# Patient Record
Sex: FEMALE | Race: WHITE | NOT HISPANIC OR LATINO | Employment: OTHER | URBAN - METROPOLITAN AREA
[De-identification: names, ages, dates, MRNs, and addresses within clinical notes are randomized per-mention and may not be internally consistent; named-entity substitution may affect disease eponyms.]

---

## 2017-05-05 ENCOUNTER — APPOINTMENT (OUTPATIENT)
Dept: PHYSICAL THERAPY | Facility: MEDICAL CENTER | Age: 82
End: 2017-05-05
Payer: COMMERCIAL

## 2017-05-05 PROCEDURE — 97161 PT EVAL LOW COMPLEX 20 MIN: CPT

## 2017-05-08 ENCOUNTER — APPOINTMENT (OUTPATIENT)
Dept: PHYSICAL THERAPY | Facility: MEDICAL CENTER | Age: 82
End: 2017-05-08
Payer: COMMERCIAL

## 2017-05-08 PROCEDURE — 97110 THERAPEUTIC EXERCISES: CPT

## 2017-05-08 PROCEDURE — 97140 MANUAL THERAPY 1/> REGIONS: CPT

## 2017-05-12 ENCOUNTER — APPOINTMENT (OUTPATIENT)
Dept: PHYSICAL THERAPY | Facility: MEDICAL CENTER | Age: 82
End: 2017-05-12
Payer: COMMERCIAL

## 2017-05-12 ENCOUNTER — TRANSCRIBE ORDERS (OUTPATIENT)
Dept: ADMINISTRATIVE | Facility: HOSPITAL | Age: 82
End: 2017-05-12

## 2017-05-12 DIAGNOSIS — M54.16 LEFT LUMBAR RADICULOPATHY: Primary | ICD-10-CM

## 2017-05-12 PROCEDURE — 97110 THERAPEUTIC EXERCISES: CPT

## 2017-05-13 ENCOUNTER — HOSPITAL ENCOUNTER (OUTPATIENT)
Dept: MRI IMAGING | Facility: HOSPITAL | Age: 82
Discharge: HOME | End: 2017-05-13
Payer: COMMERCIAL

## 2017-05-13 DIAGNOSIS — M54.16 LEFT LUMBAR RADICULOPATHY: ICD-10-CM

## 2017-05-13 PROCEDURE — 72148 MRI LUMBAR SPINE W/O DYE: CPT

## 2017-05-15 ENCOUNTER — ALLSCRIPTS OFFICE VISIT (OUTPATIENT)
Dept: OTHER | Facility: OTHER | Age: 82
End: 2017-05-15

## 2017-05-15 ENCOUNTER — APPOINTMENT (OUTPATIENT)
Dept: PHYSICAL THERAPY | Facility: MEDICAL CENTER | Age: 82
End: 2017-05-15
Payer: COMMERCIAL

## 2017-05-16 ENCOUNTER — GENERIC CONVERSION - ENCOUNTER (OUTPATIENT)
Dept: OTHER | Facility: OTHER | Age: 82
End: 2017-05-16

## 2017-05-18 ENCOUNTER — GENERIC CONVERSION - ENCOUNTER (OUTPATIENT)
Dept: OTHER | Facility: OTHER | Age: 82
End: 2017-05-18

## 2017-05-18 ENCOUNTER — ALLSCRIPTS OFFICE VISIT (OUTPATIENT)
Dept: RADIOLOGY | Facility: CLINIC | Age: 82
End: 2017-05-18
Payer: COMMERCIAL

## 2017-05-19 ENCOUNTER — GENERIC CONVERSION - ENCOUNTER (OUTPATIENT)
Dept: OTHER | Facility: OTHER | Age: 82
End: 2017-05-19

## 2017-05-19 ENCOUNTER — APPOINTMENT (OUTPATIENT)
Dept: PHYSICAL THERAPY | Facility: MEDICAL CENTER | Age: 82
End: 2017-05-19
Payer: COMMERCIAL

## 2017-05-22 ENCOUNTER — APPOINTMENT (OUTPATIENT)
Dept: PHYSICAL THERAPY | Facility: MEDICAL CENTER | Age: 82
End: 2017-05-22
Payer: COMMERCIAL

## 2017-05-24 ENCOUNTER — GENERIC CONVERSION - ENCOUNTER (OUTPATIENT)
Dept: OTHER | Facility: OTHER | Age: 82
End: 2017-05-24

## 2017-05-25 ENCOUNTER — GENERIC CONVERSION - ENCOUNTER (OUTPATIENT)
Dept: OTHER | Facility: OTHER | Age: 82
End: 2017-05-25

## 2017-05-26 ENCOUNTER — APPOINTMENT (OUTPATIENT)
Dept: PHYSICAL THERAPY | Facility: MEDICAL CENTER | Age: 82
End: 2017-05-26
Payer: COMMERCIAL

## 2017-05-30 ENCOUNTER — APPOINTMENT (OUTPATIENT)
Dept: PHYSICAL THERAPY | Facility: MEDICAL CENTER | Age: 82
End: 2017-05-30
Payer: COMMERCIAL

## 2017-05-30 ENCOUNTER — GENERIC CONVERSION - ENCOUNTER (OUTPATIENT)
Dept: OTHER | Facility: OTHER | Age: 82
End: 2017-05-30

## 2017-06-01 ENCOUNTER — GENERIC CONVERSION - ENCOUNTER (OUTPATIENT)
Dept: OTHER | Facility: OTHER | Age: 82
End: 2017-06-01

## 2017-06-01 ENCOUNTER — ALLSCRIPTS OFFICE VISIT (OUTPATIENT)
Dept: RADIOLOGY | Facility: CLINIC | Age: 82
End: 2017-06-01
Payer: COMMERCIAL

## 2017-06-08 ENCOUNTER — GENERIC CONVERSION - ENCOUNTER (OUTPATIENT)
Dept: OTHER | Facility: OTHER | Age: 82
End: 2017-06-08

## 2017-06-15 ENCOUNTER — ALLSCRIPTS OFFICE VISIT (OUTPATIENT)
Dept: RADIOLOGY | Facility: CLINIC | Age: 82
End: 2017-06-15
Payer: COMMERCIAL

## 2017-06-16 ENCOUNTER — ALLSCRIPTS OFFICE VISIT (OUTPATIENT)
Dept: OTHER | Facility: OTHER | Age: 82
End: 2017-06-16

## 2017-06-16 ENCOUNTER — GENERIC CONVERSION - ENCOUNTER (OUTPATIENT)
Dept: OTHER | Facility: OTHER | Age: 82
End: 2017-06-16

## 2017-06-20 ENCOUNTER — GENERIC CONVERSION - ENCOUNTER (OUTPATIENT)
Dept: OTHER | Facility: OTHER | Age: 82
End: 2017-06-20

## 2017-07-07 ENCOUNTER — ALLSCRIPTS OFFICE VISIT (OUTPATIENT)
Dept: OTHER | Facility: OTHER | Age: 82
End: 2017-07-07

## 2017-07-07 DIAGNOSIS — M48.061 SPINAL STENOSIS OF LUMBAR REGION: ICD-10-CM

## 2017-07-07 DIAGNOSIS — M43.16 SPONDYLOLISTHESIS OF LUMBAR REGION: ICD-10-CM

## 2017-07-07 DIAGNOSIS — M54.16 RADICULOPATHY OF LUMBAR REGION: ICD-10-CM

## 2017-07-07 DIAGNOSIS — M54.50 LOW BACK PAIN: ICD-10-CM

## 2017-07-07 DIAGNOSIS — M51.26 OTHER INTERVERTEBRAL DISC DISPLACEMENT, LUMBAR REGION: ICD-10-CM

## 2017-07-07 DIAGNOSIS — M79.606 PAIN OF LOWER EXTREMITY: ICD-10-CM

## 2017-07-11 ENCOUNTER — APPOINTMENT (OUTPATIENT)
Dept: PHYSICAL THERAPY | Facility: MEDICAL CENTER | Age: 82
End: 2017-07-11
Payer: COMMERCIAL

## 2017-07-11 DIAGNOSIS — M43.16 SPONDYLOLISTHESIS OF LUMBAR REGION: ICD-10-CM

## 2017-07-11 DIAGNOSIS — M54.16 RADICULOPATHY OF LUMBAR REGION: ICD-10-CM

## 2017-07-11 DIAGNOSIS — M51.26 OTHER INTERVERTEBRAL DISC DISPLACEMENT, LUMBAR REGION: ICD-10-CM

## 2017-07-11 DIAGNOSIS — M48.061 SPINAL STENOSIS OF LUMBAR REGION: ICD-10-CM

## 2017-07-11 DIAGNOSIS — M79.606 PAIN OF LOWER EXTREMITY: ICD-10-CM

## 2017-07-11 DIAGNOSIS — M54.50 LOW BACK PAIN: ICD-10-CM

## 2017-07-11 PROCEDURE — 97161 PT EVAL LOW COMPLEX 20 MIN: CPT

## 2017-07-14 ENCOUNTER — APPOINTMENT (OUTPATIENT)
Dept: PHYSICAL THERAPY | Facility: MEDICAL CENTER | Age: 82
End: 2017-07-14
Payer: COMMERCIAL

## 2017-07-14 PROCEDURE — 97110 THERAPEUTIC EXERCISES: CPT

## 2017-07-19 ENCOUNTER — APPOINTMENT (OUTPATIENT)
Dept: PHYSICAL THERAPY | Facility: MEDICAL CENTER | Age: 82
End: 2017-07-19
Payer: COMMERCIAL

## 2017-07-19 PROCEDURE — 97110 THERAPEUTIC EXERCISES: CPT

## 2017-07-21 ENCOUNTER — APPOINTMENT (OUTPATIENT)
Dept: PHYSICAL THERAPY | Facility: MEDICAL CENTER | Age: 82
End: 2017-07-21
Payer: COMMERCIAL

## 2017-07-21 PROCEDURE — 97110 THERAPEUTIC EXERCISES: CPT

## 2017-07-26 ENCOUNTER — APPOINTMENT (OUTPATIENT)
Dept: PHYSICAL THERAPY | Facility: MEDICAL CENTER | Age: 82
End: 2017-07-26
Payer: COMMERCIAL

## 2017-07-26 PROCEDURE — 97140 MANUAL THERAPY 1/> REGIONS: CPT

## 2017-07-26 PROCEDURE — 97110 THERAPEUTIC EXERCISES: CPT

## 2017-07-28 ENCOUNTER — APPOINTMENT (OUTPATIENT)
Dept: PHYSICAL THERAPY | Facility: MEDICAL CENTER | Age: 82
End: 2017-07-28
Payer: COMMERCIAL

## 2017-07-28 PROCEDURE — 97012 MECHANICAL TRACTION THERAPY: CPT

## 2017-07-28 PROCEDURE — 97110 THERAPEUTIC EXERCISES: CPT

## 2017-08-01 ENCOUNTER — APPOINTMENT (OUTPATIENT)
Dept: PHYSICAL THERAPY | Facility: MEDICAL CENTER | Age: 82
End: 2017-08-01
Payer: COMMERCIAL

## 2017-08-01 PROCEDURE — 97110 THERAPEUTIC EXERCISES: CPT

## 2017-08-02 ENCOUNTER — APPOINTMENT (OUTPATIENT)
Dept: PHYSICAL THERAPY | Facility: MEDICAL CENTER | Age: 82
End: 2017-08-02
Payer: COMMERCIAL

## 2017-08-04 ENCOUNTER — APPOINTMENT (OUTPATIENT)
Dept: PHYSICAL THERAPY | Facility: MEDICAL CENTER | Age: 82
End: 2017-08-04
Payer: COMMERCIAL

## 2017-08-04 PROCEDURE — 97012 MECHANICAL TRACTION THERAPY: CPT

## 2017-08-04 PROCEDURE — 97110 THERAPEUTIC EXERCISES: CPT

## 2017-08-08 ENCOUNTER — APPOINTMENT (OUTPATIENT)
Dept: PHYSICAL THERAPY | Facility: MEDICAL CENTER | Age: 82
End: 2017-08-08
Payer: COMMERCIAL

## 2017-08-08 PROCEDURE — 97110 THERAPEUTIC EXERCISES: CPT

## 2017-08-09 ENCOUNTER — APPOINTMENT (OUTPATIENT)
Dept: PHYSICAL THERAPY | Facility: MEDICAL CENTER | Age: 82
End: 2017-08-09
Payer: COMMERCIAL

## 2017-08-11 ENCOUNTER — APPOINTMENT (OUTPATIENT)
Dept: PHYSICAL THERAPY | Facility: MEDICAL CENTER | Age: 82
End: 2017-08-11
Payer: COMMERCIAL

## 2017-08-11 PROCEDURE — 97110 THERAPEUTIC EXERCISES: CPT

## 2017-08-15 ENCOUNTER — APPOINTMENT (OUTPATIENT)
Dept: PHYSICAL THERAPY | Facility: MEDICAL CENTER | Age: 82
End: 2017-08-15
Payer: COMMERCIAL

## 2017-08-15 PROCEDURE — 97110 THERAPEUTIC EXERCISES: CPT

## 2017-08-15 PROCEDURE — 97012 MECHANICAL TRACTION THERAPY: CPT

## 2017-08-18 ENCOUNTER — APPOINTMENT (OUTPATIENT)
Dept: PHYSICAL THERAPY | Facility: MEDICAL CENTER | Age: 82
End: 2017-08-18
Payer: COMMERCIAL

## 2017-08-18 PROCEDURE — 97012 MECHANICAL TRACTION THERAPY: CPT

## 2017-08-18 PROCEDURE — 97110 THERAPEUTIC EXERCISES: CPT

## 2017-08-22 ENCOUNTER — APPOINTMENT (OUTPATIENT)
Dept: PHYSICAL THERAPY | Facility: MEDICAL CENTER | Age: 82
End: 2017-08-22
Payer: COMMERCIAL

## 2017-08-22 PROCEDURE — 97110 THERAPEUTIC EXERCISES: CPT

## 2017-08-22 PROCEDURE — 97140 MANUAL THERAPY 1/> REGIONS: CPT

## 2017-08-25 ENCOUNTER — APPOINTMENT (OUTPATIENT)
Dept: PHYSICAL THERAPY | Facility: MEDICAL CENTER | Age: 82
End: 2017-08-25
Payer: COMMERCIAL

## 2017-08-25 PROCEDURE — 97012 MECHANICAL TRACTION THERAPY: CPT

## 2017-08-25 PROCEDURE — 97110 THERAPEUTIC EXERCISES: CPT

## 2017-08-29 ENCOUNTER — APPOINTMENT (OUTPATIENT)
Dept: PHYSICAL THERAPY | Facility: MEDICAL CENTER | Age: 82
End: 2017-08-29
Payer: COMMERCIAL

## 2017-08-29 PROCEDURE — 97012 MECHANICAL TRACTION THERAPY: CPT

## 2017-08-29 PROCEDURE — 97110 THERAPEUTIC EXERCISES: CPT

## 2017-09-01 ENCOUNTER — APPOINTMENT (OUTPATIENT)
Dept: PHYSICAL THERAPY | Facility: MEDICAL CENTER | Age: 82
End: 2017-09-01
Payer: COMMERCIAL

## 2017-09-01 PROCEDURE — 97110 THERAPEUTIC EXERCISES: CPT

## 2017-09-01 PROCEDURE — 97012 MECHANICAL TRACTION THERAPY: CPT

## 2017-09-05 ENCOUNTER — APPOINTMENT (OUTPATIENT)
Dept: PHYSICAL THERAPY | Facility: MEDICAL CENTER | Age: 82
End: 2017-09-05
Payer: COMMERCIAL

## 2017-09-05 PROCEDURE — 97110 THERAPEUTIC EXERCISES: CPT

## 2017-09-05 PROCEDURE — 97012 MECHANICAL TRACTION THERAPY: CPT

## 2017-09-08 ENCOUNTER — APPOINTMENT (OUTPATIENT)
Dept: PHYSICAL THERAPY | Facility: MEDICAL CENTER | Age: 82
End: 2017-09-08
Payer: COMMERCIAL

## 2017-09-08 PROCEDURE — 97110 THERAPEUTIC EXERCISES: CPT

## 2017-09-12 ENCOUNTER — APPOINTMENT (OUTPATIENT)
Dept: PHYSICAL THERAPY | Facility: MEDICAL CENTER | Age: 82
End: 2017-09-12
Payer: COMMERCIAL

## 2017-09-12 PROCEDURE — 97110 THERAPEUTIC EXERCISES: CPT

## 2017-09-14 ENCOUNTER — GENERIC CONVERSION - ENCOUNTER (OUTPATIENT)
Dept: OTHER | Facility: OTHER | Age: 82
End: 2017-09-14

## 2017-09-15 ENCOUNTER — ALLSCRIPTS OFFICE VISIT (OUTPATIENT)
Dept: OTHER | Facility: OTHER | Age: 82
End: 2017-09-15

## 2017-09-15 ENCOUNTER — APPOINTMENT (OUTPATIENT)
Dept: PHYSICAL THERAPY | Facility: MEDICAL CENTER | Age: 82
End: 2017-09-15
Payer: COMMERCIAL

## 2017-09-15 PROCEDURE — 97012 MECHANICAL TRACTION THERAPY: CPT

## 2017-09-15 PROCEDURE — 97110 THERAPEUTIC EXERCISES: CPT

## 2017-09-19 ENCOUNTER — APPOINTMENT (OUTPATIENT)
Dept: PHYSICAL THERAPY | Facility: MEDICAL CENTER | Age: 82
End: 2017-09-19
Payer: COMMERCIAL

## 2017-09-19 PROCEDURE — 97012 MECHANICAL TRACTION THERAPY: CPT

## 2017-09-19 PROCEDURE — 97110 THERAPEUTIC EXERCISES: CPT

## 2017-09-22 ENCOUNTER — APPOINTMENT (OUTPATIENT)
Dept: PHYSICAL THERAPY | Facility: MEDICAL CENTER | Age: 82
End: 2017-09-22
Payer: COMMERCIAL

## 2017-09-22 PROCEDURE — 97110 THERAPEUTIC EXERCISES: CPT

## 2017-09-26 ENCOUNTER — APPOINTMENT (OUTPATIENT)
Dept: PHYSICAL THERAPY | Facility: MEDICAL CENTER | Age: 82
End: 2017-09-26
Payer: COMMERCIAL

## 2017-09-26 PROCEDURE — 97012 MECHANICAL TRACTION THERAPY: CPT

## 2017-09-26 PROCEDURE — 97110 THERAPEUTIC EXERCISES: CPT

## 2017-09-29 ENCOUNTER — APPOINTMENT (OUTPATIENT)
Dept: PHYSICAL THERAPY | Facility: MEDICAL CENTER | Age: 82
End: 2017-09-29
Payer: COMMERCIAL

## 2017-09-29 PROCEDURE — 97110 THERAPEUTIC EXERCISES: CPT

## 2017-09-29 PROCEDURE — 97012 MECHANICAL TRACTION THERAPY: CPT

## 2017-10-03 ENCOUNTER — APPOINTMENT (OUTPATIENT)
Dept: PHYSICAL THERAPY | Facility: MEDICAL CENTER | Age: 82
End: 2017-10-03
Payer: COMMERCIAL

## 2017-10-03 PROCEDURE — 97110 THERAPEUTIC EXERCISES: CPT

## 2017-10-03 PROCEDURE — 97012 MECHANICAL TRACTION THERAPY: CPT

## 2017-10-06 ENCOUNTER — APPOINTMENT (OUTPATIENT)
Dept: PHYSICAL THERAPY | Facility: MEDICAL CENTER | Age: 82
End: 2017-10-06
Payer: COMMERCIAL

## 2017-10-06 PROCEDURE — 97110 THERAPEUTIC EXERCISES: CPT

## 2017-10-10 ENCOUNTER — APPOINTMENT (OUTPATIENT)
Dept: PHYSICAL THERAPY | Facility: MEDICAL CENTER | Age: 82
End: 2017-10-10
Payer: COMMERCIAL

## 2017-10-10 PROCEDURE — 97110 THERAPEUTIC EXERCISES: CPT

## 2017-10-13 ENCOUNTER — APPOINTMENT (OUTPATIENT)
Dept: PHYSICAL THERAPY | Facility: MEDICAL CENTER | Age: 82
End: 2017-10-13
Payer: COMMERCIAL

## 2017-10-13 PROCEDURE — 97110 THERAPEUTIC EXERCISES: CPT

## 2017-10-17 ENCOUNTER — APPOINTMENT (OUTPATIENT)
Dept: PHYSICAL THERAPY | Facility: MEDICAL CENTER | Age: 82
End: 2017-10-17
Payer: COMMERCIAL

## 2017-10-17 PROCEDURE — 97012 MECHANICAL TRACTION THERAPY: CPT

## 2017-10-17 PROCEDURE — 97110 THERAPEUTIC EXERCISES: CPT

## 2017-10-20 ENCOUNTER — APPOINTMENT (OUTPATIENT)
Dept: PHYSICAL THERAPY | Facility: MEDICAL CENTER | Age: 82
End: 2017-10-20
Payer: COMMERCIAL

## 2017-10-20 PROCEDURE — 97110 THERAPEUTIC EXERCISES: CPT

## 2017-10-20 PROCEDURE — 97012 MECHANICAL TRACTION THERAPY: CPT

## 2017-10-24 ENCOUNTER — APPOINTMENT (OUTPATIENT)
Dept: PHYSICAL THERAPY | Facility: MEDICAL CENTER | Age: 82
End: 2017-10-24
Payer: COMMERCIAL

## 2017-10-24 PROCEDURE — 97110 THERAPEUTIC EXERCISES: CPT

## 2017-10-24 PROCEDURE — 97012 MECHANICAL TRACTION THERAPY: CPT

## 2017-10-26 NOTE — PROGRESS NOTES
Assessment  1  Acute left-sided low back pain (724 2) (M54 5)   2  Left lumbar radiculopathy (724 4) (M54 16)   3  Herniated nucleus pulposus, L4-5 left (722 10) (M51 26)   4  Spondylolisthesis at L4-L5 level (756 12) (M43 16)   5  Lumbar stenosis with neurogenic claudication (724 03) (M48 06)    Plan  Acute left-sided low back pain    · Follow-up PRN Evaluation and Treatment  Follow-up  Status: Complete  Done:  [id number]   Ordered; For: Acute left-sided low back pain; Ordered By: Shiv Owens Performed:  Due: 10Eca1355  Unlinked    · Docusate Sodium 100 MG Oral Tablet   Dispense: 0 Days ; #:0 Tablet; Refill: 0; KAROLINA = N; Record; Last Updated By: Tristian Doty; 9/15/2017 11:33:58 AM    Discussion/Summary    While the patient and her  were in the office today, I explained to the patient that since she is doing so well, I agree, that it makes sense to hold off any repeat injections for now  However, I did explain to her that if her pain symptoms should return, proceed with repeat injections in the future would always be a possibility  The patient was agreeable and verbalized an understanding  thoroughly encouraged the patient continue with her home exercise and stretching program as well as her physical therapy and aquatherapy  I encouraged her to slowly and steadily increase her her activity as tolerated, allowing pain be her guide  The patient was agreeable and verbalized an understanding  discussed with the patient that at this point time she can followup with our office on an as-needed basis  I did review the patient that if her pain symptoms should change, worsen, and/or if she would experience any new symptoms as she would like to be evaluated for, she should give our office a call  The patient was agreeable and verbalized an understanding  The patient has the current Goals: To continue with the current level of pain relief and hold off injections or procedures   The patent has the current Barriers: None    Patient is able to Self-Care  The treatment plan was reviewed with the patient/guardian  The patient/guardian understands and agrees with the treatment plan   The patient and patient's family was counseled regarding instructions for management,-- prognosis,-- patient and family education,-- risks and benefits of treatment options-- and-- importance of compliance with treatment  total time of encounter was 15 minutes  Chief Complaint  1  Pain  Left sided low back and leg pain, improved/stable  History of Present Illness  The patient presents today for a follow up office visit  The patient is currently being treated for her left-sided low back and left lower extremity radicular symptoms, which the patient reports has continued to improve since her last office visit as she did proceed with the physical therapy as discussed  She reports that she is doing regular therapy and aquatherapy and feels that between that and the decompression therapy she is noting such significant stable relief  She currently denies taking any prescribed medications her pain indication uses over-the-counter Tylenol  The patient feels that she is doing significantly well and would like to continue to hold off injections and see how she does  Clifm Locus presents with complaints of occasional episodes of mild generalized pain  On a scale of 1 to 10, the patient rates the pain as 3  Symptoms are improving  Review of Systems    Constitutional: no fever,-- no recent weight gain-- and-- no recent weight loss  Eyes: no double vision-- and-- no blurry vision  Cardiovascular: no chest pain,-- no palpitations-- and-- no lower extremity edema  Respiratory: no complaints of shortness of breath-- and-- no wheezing  Musculoskeletal: difficulty walking, but-- no muscle weakness,-- no joint stiffness,-- no joint swelling,-- no limb swelling,-- no pain in extremity-- and-- no decreased range of motion     Neurological: dizziness, but-- no difficulty swallowing,-- no memory loss,-- no loss of consciousness-- and-- no seizures  Gastrointestinal: no nausea,-- no vomiting,-- no constipation-- and-- no diarrhea  Genitourinary: no difficulty initiating urine stream,-- no genital pain-- and-- no frequent urination  Integumentary: no complaints of skin rash  Psychiatric: no depression  Endocrine: no excessive thirst,-- no adrenal disease,-- no hypothyroidism-- and-- no hyperthyroidism  Hematologic/Lymphatic: no tendency for easy bruising-- and-- no tendency for easy bleeding  Active Problems  1  Acute left-sided low back pain (724 2) (M54 5)   2  Ambulatory dysfunction (719 7) (R26 2)   3  Arthritis (716 90) (M19 90)   4  Heart burn (787 1) (R12)   5  Herniated nucleus pulposus, L4-5 left (722 10) (M51 26)   6  Left lumbar radiculopathy (724 4) (M54 16)   7  Leg pain (729 5) (M79 606)   8  Lumbar stenosis with neurogenic claudication (724 03) (M48 06)   9  Osteoporosis (733 00) (M81 0)   10  Seasonal allergies (477 9) (J30 2)   11  Spondylolisthesis at L4-L5 level (756 12) (M43 16)   12  Wears glasses (V49 89) (Z97 3)    Past Medical History  1  History of Anxiety (300 00) (F41 9)   2  History of cardiac murmur (V12 59) (Z86 79)   3  History of cataract (V12 49) (Z86 69)   4  History of hypertension (V12 59) (Z86 79)    The active problems and past medical history were reviewed and updated today  Surgical History  1  History of Appendectomy   2  History of Cataract Surgery   3  History of Dilation And Curettage   4  History of Hysterectomy   5  History of Tonsillectomy    The surgical history was reviewed and updated today  Family History  Mother    1  Family history of    2  Family history of cardiac disorder (V17 49) (Z82 49)  Father    3  Family history of    4  Family history of Alzheimer's disease (V17 2) (Z82 0)  Sibling    5  Family history of    6   Family history of pancreatic cancer (V16 0) (Z80 0)  Family History    7  Family history of Back disorder   8  Family history of heart disease (V17 49) (Z82 49)   9  Family history of hypertension (V17 49) (Z82 49)   10  Family history of malignant neoplasm (V16 9) (Z80 9)   11  Family history of stroke (V17 1) (Z82 3)    The family history was reviewed and updated today  Social History   · Has 2 children   · High school or GED   · Lives with spouse   ·    · Never a smoker   · No alcohol use   · Retired  The social history was reviewed and updated today  The social history was reviewed and is unchanged  Current Meds   1  B Complex Oral Tablet; TAKE 1 TABLET DAILY; Therapy: (Recorded:16Jun2017) to Recorded   2  Co Q10 100 MG Oral Capsule; take 1 capsule daily; Therapy: (Recorded:16Jun2017) to Recorded   3  Docusate Sodium 100 MG Oral Tablet; TAKE 1 TABLET Twice daily PRN; Therapy: (Recorded:16Jun2017) to Recorded   4  Fish Oil 1000 MG Oral Capsule; take 1 capsule daily; Therapy: (Recorded:16Jun2017) to Recorded   5  Probiotic CAPS Recorded   6  Vitamin C 500 MG Oral Tablet Recorded   7  Vitamin D3 1000 UNIT Oral Tablet; Take 1 tablet daily; Therapy: (Recorded:16Jun2017) to Recorded    The medication list was reviewed and updated today  Allergies  1  codeine   2  Erythromycin TABS   3  Percocet TABS    Vitals  Vital Signs    Recorded: 15Sep2017 11:32AM   Temperature 98 3 F   Heart Rate 76   Systolic 003   Diastolic 80   Height 5 ft 1 in   Weight 133 lb 3 04 oz   BMI Calculated 25 17   BSA Calculated 1 58   Pain Scale 2     Physical Exam    Constitutional   General appearance: Well developed, well nourished, alert, in no distress, non-toxic and no overt pain behavior  Eyes   Sclera: anicteric   HEENT   Hearing grossly intact  Pulmonary   Respiratory effort: Even and unlabored  Cardiovascular   Examination of extremities: No edema or pitting edema present        Abdomen   Abdomen: Soft, non-tender, non-distended  Skin   Skin and subcutaneous tissue: Normal without rashes or lesions, well hydrated  Psychiatric   Mood and affect: Mood and affect appropriate  Neurologic   Cranial nerves: Cranial nerves II-XII grossly intact      the muscle tone was normal   Musculoskeletal Tandem Gait: Intact      Signatures   Electronically signed by : BETZAIDA Mckinney; Sep 15 2017  1:11PM EST                       (Author)    Electronically signed by : Delvin Ojeda DO; Sep 15 2017  1:11PM EST

## 2017-10-27 ENCOUNTER — APPOINTMENT (OUTPATIENT)
Dept: PHYSICAL THERAPY | Facility: MEDICAL CENTER | Age: 82
End: 2017-10-27
Payer: COMMERCIAL

## 2017-10-27 PROCEDURE — 97012 MECHANICAL TRACTION THERAPY: CPT

## 2017-10-27 PROCEDURE — 97110 THERAPEUTIC EXERCISES: CPT

## 2017-10-27 PROCEDURE — 97140 MANUAL THERAPY 1/> REGIONS: CPT

## 2017-10-31 ENCOUNTER — APPOINTMENT (OUTPATIENT)
Dept: PHYSICAL THERAPY | Facility: MEDICAL CENTER | Age: 82
End: 2017-10-31
Payer: COMMERCIAL

## 2017-10-31 PROCEDURE — 97110 THERAPEUTIC EXERCISES: CPT

## 2017-11-03 ENCOUNTER — APPOINTMENT (OUTPATIENT)
Dept: PHYSICAL THERAPY | Facility: MEDICAL CENTER | Age: 82
End: 2017-11-03
Payer: COMMERCIAL

## 2017-11-07 ENCOUNTER — APPOINTMENT (OUTPATIENT)
Dept: PHYSICAL THERAPY | Facility: MEDICAL CENTER | Age: 82
End: 2017-11-07
Payer: COMMERCIAL

## 2017-11-10 ENCOUNTER — APPOINTMENT (OUTPATIENT)
Dept: PHYSICAL THERAPY | Facility: MEDICAL CENTER | Age: 82
End: 2017-11-10
Payer: COMMERCIAL

## 2017-11-14 ENCOUNTER — APPOINTMENT (OUTPATIENT)
Dept: PHYSICAL THERAPY | Facility: MEDICAL CENTER | Age: 82
End: 2017-11-14
Payer: COMMERCIAL

## 2017-11-14 PROCEDURE — 97012 MECHANICAL TRACTION THERAPY: CPT

## 2017-11-17 ENCOUNTER — APPOINTMENT (OUTPATIENT)
Dept: PHYSICAL THERAPY | Facility: MEDICAL CENTER | Age: 82
End: 2017-11-17
Payer: COMMERCIAL

## 2017-12-01 ENCOUNTER — APPOINTMENT (OUTPATIENT)
Dept: PHYSICAL THERAPY | Facility: MEDICAL CENTER | Age: 82
End: 2017-12-01
Payer: COMMERCIAL

## 2017-12-01 PROCEDURE — 97012 MECHANICAL TRACTION THERAPY: CPT

## 2017-12-22 ENCOUNTER — APPOINTMENT (OUTPATIENT)
Dept: PHYSICAL THERAPY | Facility: MEDICAL CENTER | Age: 82
End: 2017-12-22
Payer: COMMERCIAL

## 2017-12-22 PROCEDURE — G8990 OTHER PT/OT CURRENT STATUS: HCPCS | Performed by: PHYSICAL THERAPIST

## 2017-12-22 PROCEDURE — 97012 MECHANICAL TRACTION THERAPY: CPT

## 2017-12-22 PROCEDURE — G8991 OTHER PT/OT GOAL STATUS: HCPCS | Performed by: PHYSICAL THERAPIST

## 2017-12-30 ENCOUNTER — OFFICE VISIT (OUTPATIENT)
Dept: URGENT CARE | Facility: MEDICAL CENTER | Age: 82
End: 2017-12-30
Payer: COMMERCIAL

## 2017-12-30 PROCEDURE — 99212 OFFICE O/P EST SF 10 MIN: CPT

## 2017-12-30 PROCEDURE — S9088 SERVICES PROVIDED IN URGENT: HCPCS

## 2018-01-04 ENCOUNTER — APPOINTMENT (OUTPATIENT)
Dept: PHYSICAL THERAPY | Facility: MEDICAL CENTER | Age: 83
End: 2018-01-04
Payer: COMMERCIAL

## 2018-01-04 NOTE — PROGRESS NOTES
Assessment   1  Viral syndrome (793 99) (B34 9)   2  Bilateral impacted cerumen (380 4) (Z02 23)    Plan   Bilateral impacted cerumen    · Benzonatate 100 MG Oral Capsule; TAKE 1 CAPSULE EVERY 6 HOURS AS    NEEDED   · Oseltamivir Phosphate 75 MG Oral Capsule (Tamiflu); TAKE 1 CAPSULE TWICE    DAILY WITH MEALS    Discussion/Summary   Discussion Summary:    Increase fluids,Follow-up if symptoms increase or no better in 5 days  Right ear was uncomfortable to trying Irrigate and patient will utilize Debrox or Cerumenex in both years and then follow-up with family Dr  for removal of cerumen impaction bilaterally  Medication Side Effects Reviewed: Possible side effects of new medications were reviewed with the patient/guardian today  Understands and agrees with treatment plan: The treatment plan was reviewed with the patient/guardian  The patient/guardian understands and agrees with the treatment plan    Counseling Documentation With Imm: The patient was counseled regarding instructions for management,-- prognosis,-- patient and family education,-- impressions  Chief Complaint   1  Cold Symptoms  Chief Complaint Free Text Note Form: Pt with complaints of cough, nasal congestion , runny nose, right ear pain since yesterday  History of Present Illness   HPI: Patient with one-day history of nonproductive cough, congestion, rhinorrhea, right ear discomfort as well as myalgias  She also complains of a fever  No other complaints  Hospital Based Practices Required Assessment:      Pain Assessment      the patient states they have pain  The pain is located in the headache  The patient describes the pain as aching  (on a scale of 0 to 10, the patient rates the pain at 4 )      Abuse And Domestic Violence Screen       Yes, the patient is safe at home  -- The patient states no one is hurting them  Depression And Suicide Screen  No, the patient has not had thoughts of hurting themself        No, the patient has not felt depressed in the past 7 days  Prefered Language is  english  Primary Language is  english  Cold Symptoms: Sander Sandhu presents with complaints of cold symptoms  Associated symptoms include sneezing,-- nasal congestion,-- runny nose,-- post nasal drainage,-- scratchy throat,-- dry cough,-- plugged ear(s),-- ear pain,-- fatigue-- and-- fever, but-- no sore throat,-- no hoarseness,-- no productive cough,-- no facial pressure,-- no facial pain,-- no headache,-- no swollen lymph nodes,-- no wheezing,-- no shortness of breath,-- no weakness,-- no nausea,-- no vomiting,-- no diarrhea-- and-- no chills  Review of Systems   ROS Reviewed:    ROS reviewed  Active Problems   1  Acute left-sided low back pain (724 2) (M54 5)   2  Ambulatory dysfunction (719 7) (R26 2)   3  Arthritis (716 90) (M19 90)   4  Heart burn (787 1) (R12)   5  Herniated nucleus pulposus, L4-5 left (722 10) (M51 26)   6  Left lumbar radiculopathy (724 4) (M54 16)   7  Leg pain (729 5) (M79 606)   8  Lumbar stenosis with neurogenic claudication (724 03) (M48 062)   9  Osteoporosis (733 00) (M81 0)   10  Seasonal allergies (477 9) (J30 2)   11  Spondylolisthesis at L4-L5 level (756 12) (M43 16)   12  Wears glasses (V49 89) (Z97 3)    Past Medical History   1  History of Anxiety (300 00) (F41 9)   2  History of cardiac murmur (V12 59) (Z86 79)   3  History of cataract (V12 49) (Z86 69)   4  History of hypertension (V12 59) (Z86 79)    Family History   Mother    1  Family history of    2  Family history of cardiac disorder (V17 49) (Z82 49)  Father    3  Family history of    4  Family history of Alzheimer's disease (V17 2) (Z82 0)  Sibling    5  Family history of    6  Family history of pancreatic cancer (V16 0) (Z80 0)  Family History    7  Family history of Back disorder   8  Family history of heart disease (V17 49) (Z82 49)   9  Family history of hypertension (V17 49) (Z82 49)   10   Family history of malignant neoplasm (V16 9) (Z80 9)   11  Family history of stroke (V17 1) (Z82 3)    Social History    · Has 2 children   · High school or GED   · Lives with spouse   ·    · Never a smoker   · No alcohol use   · Retired    Surgical History   1  History of Appendectomy   2  History of Cataract Surgery   3  History of Dilation And Curettage   4  History of Hysterectomy   5  History of Tonsillectomy    Current Meds    1  B Complex Oral Tablet; TAKE 1 TABLET DAILY; Therapy: (Recorded:16Jun2017) to Recorded   2  Co Q10 100 MG Oral Capsule; take 1 capsule daily; Therapy: (Recorded:16Jun2017) to Recorded   3  Fish Oil 1000 MG Oral Capsule; take 1 capsule daily; Therapy: (Recorded:16Jun2017) to Recorded   4  Probiotic CAPS Recorded   5  Vitamin C 500 MG Oral Tablet Recorded   6  Vitamin D3 1000 UNIT Oral Tablet; Take 1 tablet daily; Therapy: (Recorded:16Jun2017) to Recorded    Allergies   1  codeine   2  Erythromycin TABS   3  Percocet TABS    Vitals   Signs   Recorded: 64Ggl2178 10:53AM   Temperature: 99 5 F  Heart Rate: 104  Respiration: 20  Systolic: 451  Diastolic: 80  Height: 5 ft 1 in  Weight: 133 lb   BMI Calculated: 25 13  BSA Calculated: 1 59  O2 Saturation: 98  Pain Scale: 4    Physical Exam        Constitutional      General appearance: No acute distress, well appearing and well nourished  Ears, Nose, Mouth, and Throat      External inspection of ears and nose: Normal        Otoscopic examination: Abnormal   The right tympanic membrane was obscured  The left tympanic membrane was obscured  The right external canal had a cerumen impaction  The left external canal had a cerumen impaction  Nasal mucosa, septum, and turbinates: Abnormal   There was a mucoid discharge from both nares  The bilateral nasal mucosa was boggy  Oropharynx: Abnormal   The posterior pharynx was erythematous   Inspection of the oropharynx showed fully visible tonsils, uvula and soft palate (Mallampati class 1)  Pulmonary      Respiratory effort: No increased work of breathing or signs of respiratory distress  Auscultation of lungs: Clear to auscultation  Cardiovascular      Auscultation of heart: Normal rate and rhythm, normal S1 and S2, without murmurs  Lymphatic      Palpation of lymph nodes in neck: No lymphadenopathy         Signatures    Electronically signed by : Alejandro Tipton, [facility]; Dec 30 2017 11:11AM EST                       (Author)     Electronically signed by : VANESSA Boothe ; Zachary  3 2018  9:27AM EST                       (Co-author)

## 2018-01-11 NOTE — MISCELLANEOUS
Message   Recorded as Task   Date: 05/19/2017 10:13 AM, Created By: Shanel Null   Task Name: Follow Up   Assigned To: [facility] [place] [facility],Team   Regarding Patient: Kirsten Moreland, Status: Active   CommentTom Most - 19 May 2017 10:13 AM     TASK CREATED  S/p LT L4-5 TFESI #1 on 05/18/2017 w/[facility] [place]  LT L4-5 TFESI #2 scheduled 06/01/2017      Please call on 05/25/2017   Nupur Lin - 24 May 2017 8:07 AM     TASK EDITED  Pt called following her proc on 5/18  She said she had no relief -- she said in fact some days it is higher than a 10 -- a 15  She said she only had 1/2 day until today when she had some relief  She said she is getting no sleep at all  She is asking if she should take more Lyrica for pain  Pls call pt at home # [phone]  She will be home all day  Nupur Lin - 24 May 2017 8:08 AM     TASK EDITED   Nupur Lin - 24 May 2017 8:08 AM     TASK EDITED   Nupur Lin - 24 May 2017 8:09 AM     TASK REASSIGNED: Previously Assigned To JOE Cardenas - 25 May 2017 9:24 AM     TASK EDITED  Pt's  called and stated the Lyrica and Alleve is not strong enough to override the pain  He thinks the lack of sleep due to the pain is not helping her pain  Would like to see if pt can get something to help her sleep  Please call [phone]   Mehran Coronado - 25 May 2017 10:59 AM     TASK EDITED  * I haven't spoken to the pt yet * You have a cancellation tomorrow  Her last procedure was 5/18  Repeat tomorrow? Maldonado Obando - 25 May 2017 11:20 AM     TASK REPLIED TO: Previously Assigned To Maldonado Obando  ok   Mehran Coronado - 25 May 2017 11:28 AM     TASK EDITED  s/w pt, states she is not able to sleep r/t pain  Per pt, pain is constant, unrelieved  Pt confirmed increased to lyrica tid yesterday w/ no se's  Confirmed aleve tid w/ food  no gi upset  Scheduled for LESI on 6/1  Requesting somethign for pain or sleep  Questioned duragesic   Advised pt, opioids require uds and ov  Will dw dr [facility] and cb to advise  ***Cant' move ov up - aleve tid x 2 days  Maldonado Obando - 25 May 2017 11:48 AM     TASK REPLIED TO: Previously Assigned To Maldonado Obando  ov with Mehran Hooks - 25 May 2017 11:54 AM     TASK REASSIGNED: Previously Assigned To [facility] [place] [facility],Team  emergency slot vs double book? Real Calabrese - 25 May 2017 12:32 PM     TASK REPLIED TO: Previously Assigned To Real Calabrese  Advise her to take the Lyrica 1 in AM and afternoon and 2 PO HS  What else is she taking for pain  Codeine is listed? Dose, How often? Etc  Is she still taking Prednisone? Dose how often? Thank you  Mehran Coronado - 25 May 2017 12:54 PM     TASK EDITED  s/w pt's , ness, per release of info on file  Per Ness, pt is not taking codeine r/t gi upset  No prednisone - previously rx's by pcp for inflammation  Per ness, pt is only taking lyrica and aleve as discussed previously  Advised ness of above - increase lyrica to 4 pills/ day  Ok to continue [employer] - stop 4 days before procedure on 6/1 / no aleve sunday - after procedure on 6/1  C/b if se's present w/ lyrica increase  ness verbalized understanding and appreciation  Per ness, no surgical cons at this time  Real Calabrese - 25 May 2017 12:56 PM     TASK REPLIED TO: Previously Assigned To Real Calabrese  I put in a referral to Dr Carlos Hansen for a surgical eval  We could also send her for a second opinion with Dr Simon Becker at [facility]  Mehran Coronado - 25 May 2017 1:14 PM     TASK EDITED  s/w pt's  ness, per release of info on file  Advised melody bolanos  provided / med's contact info for cons  Ness requested a referral to Dr Simon Becker as well  Adised Ness, will have that referral ready for pu on 6/1 at the time of procedure  Advised ness again, please call if se's present w/ increase in lyrica, no prednisone, stop aleve 4 days before proceudre / sunday - post procedure thursday, no aleve   Ness verbalized understandng and appreciation  *** Dr Merlin Sieving referral please  Real Calabrese - 25 May 2017 1:40 PM     TASK REPLIED TO: Previously Assigned To Real Calabrese  I put in the referral to Dr Kulwant Feng  Thank you  Althea Hartley - 25 May 2017 2:27 PM     TASK EDITED  Can you please print out for the pt  ? thanks   Mehran Coronado - 25 May 2017 3:43 PM     TASK EDITED  printed        Active Problems    1  Acute left-sided low back pain (724 2) (M54 5)   2  Ambulatory dysfunction (719 7) (R26 2)   3  Herniated nucleus pulposus, L4-5 left (722 10) (M51 26)   4  Left lumbar radiculopathy (724 4) (M54 16)   5  Leg pain (729 5) (M79 606)   6  Lumbar stenosis with neurogenic claudication (724 03) (M48 06)    Current Meds   1  Codeine Sulfate TABS; Therapy: (Recorded:78Uvk5144) to Recorded   2  LORazepam TABS; Therapy: (Recorded:76Xzv6444) to Recorded   3  Lyrica 50 MG Oral Capsule; Take 1 pill at bed x 2 days, then 1 pill 2 x daily; Therapy: 50HEP0769 to (Evaluate:26Wjh6574); Last Rx:25Igs1648 Ordered   4  PredniSONE 10 MG Oral Tablet;    Therapy: (Recorded:21Aki9851) to Recorded    Allergies    1  codeine    Signatures   Electronically signed by : Annelise Machuca, ; May 25 2017  3:43PM EST                       (Author)

## 2018-01-12 ENCOUNTER — APPOINTMENT (OUTPATIENT)
Dept: PHYSICAL THERAPY | Facility: MEDICAL CENTER | Age: 83
End: 2018-01-12
Payer: COMMERCIAL

## 2018-01-12 PROCEDURE — 97012 MECHANICAL TRACTION THERAPY: CPT

## 2018-01-12 NOTE — MISCELLANEOUS
Message   Recorded as Task   Date: 06/02/2017 08:55 AM, Created By: Lidia Friedman   Task Name: Follow Up   Assigned To: [facility]ical,Team   Regarding Patient: Destiny Obregon, Status: In Progress   Leigh See - 02 Jun 2017 8:55 AM     TASK CREATED  S/P LESI scheduled on 06/01/2017 w/SL Marleni Marlin SAWYER #2 scheduled on 06/15/2017      Please call on 06/08/2017   Lidia Friedman - 08 Jun 2017 10:56 AM     TASK EDITED  Patient states her pain is still a 10  Conf next injection scheduled on 06/15/2017   Maldonado Obando - 08 Jun 2017 11:02 AM     TASK REPLIED TO: Previously Assigned To Maldonado Obando  aware-please schedule a f/u with real after 2nd injection   Lidia Friedman - 08 Jun 2017 11:06 AM     TASK REASSIGNED: Previously Assigned To [facility],Team  Please schedule f/u with DG after 2nd injection that is scheduled on 06/15/2017  Emily Doty - 08 Jun 2017 3:44 PM     TASK IN PROGRESS   Emily Doty - 08 Jun 2017 3:46 PM     TASK EDITED  Pt is scheduled for 7/7/17 @ 11:30 with Real        Active Problems    1  Acute left-sided low back pain (724 2) (M54 5)   2  Ambulatory dysfunction (719 7) (R26 2)   3  Herniated nucleus pulposus, L4-5 left (722 10) (M51 26)   4  Left lumbar radiculopathy (724 4) (M54 16)   5  Leg pain (729 5) (M79 606)   6  Lumbar stenosis with neurogenic claudication (724 03) (M48 06)    Current Meds   1  Codeine Sulfate TABS; Therapy: (Recorded:67Bca2539) to Recorded   2  Diclofenac Sodium 1 % Transdermal Gel (Voltaren); APPLY SPARINGLY TO AFFECTED   AREA(S) ONCE DAILY; Therapy: 43VDS2505 to (Evaluate:12Jku7510)  Requested for: 14SOA8339; Last   Rx:01Jun2017 Ordered   3  LORazepam TABS; Therapy: (Recorded:33Kfs2686) to Recorded   4  Lyrica 50 MG Oral Capsule; Take 1 pill at bed x 2 days, then 1 pill 2 x daily; Therapy: 95CEK9096 to (Evaluate:14Jun2017); Last Rx:15May2017 Ordered   5  PredniSONE 10 MG Oral Tablet; Therapy: (Recorded:15May2017) to Recorded    Allergies    1  codeine    Signatures   Electronically signed by : Moiz Will, ; Jun 8 2017  3:47PM EST                       (Author)

## 2018-01-12 NOTE — MISCELLANEOUS
Message   Recorded as Task   Date: 06/01/2017 08:29 AM, Created By: Melissa Rucker   Task Name: Miscellaneous   Assigned To: [facility] [place] [facility],Team   Regarding Patient: Wilmer Shane, Status: Active   CommentLamont Co - 01 Jun 2017 8:29 AM     TASK CREATED  Unable to reactiviate task from 5/30  Pt called asking if it is ok to take Tylenol before her procedure today at 2pm  Please call [phone]  Mehran Coronado 01 Jun 2017 1:55 PM     TASK EDITED  Pt arrived for procedure w/ SL on 6/1/2017        Active Problems    1  Acute left-sided low back pain (724 2) (M54 5)   2  Ambulatory dysfunction (719 7) (R26 2)   3  Herniated nucleus pulposus, L4-5 left (722 10) (M51 26)   4  Left lumbar radiculopathy (724 4) (M54 16)   5  Leg pain (729 5) (M79 606)   6  Lumbar stenosis with neurogenic claudication (724 03) (M48 06)    Current Meds   1  Codeine Sulfate TABS; Therapy: (Recorded:18Zve4811) to Recorded   2  LORazepam TABS; Therapy: (Recorded:26Iar0825) to Recorded   3  Lyrica 50 MG Oral Capsule; Take 1 pill at bed x 2 days, then 1 pill 2 x daily; Therapy: 75EVY1647 to (Evaluate:14Jun2017); Last Rx:70Tqy3647 Ordered   4  PredniSONE 10 MG Oral Tablet;    Therapy: (Recorded:07Suu8406) to Recorded    Allergies    1  codeine    Signatures   Electronically signed by : Yoav Collins, ; Jun 1 2017  1:55PM EST                       (Author)

## 2018-01-12 NOTE — MISCELLANEOUS
Message   Recorded as Task   Date: 05/17/2017 11:17 AM, Created By: Radha Fallon   Task Name: Care Coordination   Assigned To: [facility] [place] [facility]   Regarding Patient: Holly Thomas, Status: In Progress   CommentNathanielamador Galeanos - 17 May 2017 11:17 AM     TASK CREATED  Pt's  call  Lyrica is not working and says the pain is more severe when taking it  Pt is scheduled for a procedure tomorrow, 5/18  Please call [phone]  Mehran Coronado - 17 May 2017 11:39 AM     TASK EDITED  s/w pt's , ness, per release of info on file  Per ness, pt started lyrica on Monday, c/o 20-25% increase in pain w/ lyrica  States pt is also urinating more frequently w/ Lyrica ~ q 15 min  Denies urgency, odor, fever, pain / burning w/ urination  Advised Ness, will d/w DG and cb to advise  Real Calabrese - 17 May 2017 12:36 PM     TASK REPLIED TO: Previously Assigned To Real Calabrese  Advise her to STOP the [facility] and we will see how she is feeling tomorrow and hopefully we can proceed with the injection  Mehran Coronado - 17 May 2017 1:53 PM     TASK EDITED  *** FYI ***  s/w pt, advised of above  Pt is aware, arrive at 1330, c/b if s/s of infection present, wear loose fitting clothing, avoid metal, , eat a light meal - npo 1 hour prior  Pt states she will take her ativan 1 hr prior for anxiety as prescribed  Denied blood thinning medication  pt verbalized understanding and appreciation  states it is a pleasure to come into spine and pain  Real Calabrese - 17 May 2017 4:27 PM     TASK REPLIED TO: Previously Assigned To Real Calabrese  Provider aware  Thank you  Active Problems    1  Acute left-sided low back pain (724 2) (M54 5)   2  Ambulatory dysfunction (719 7) (R26 2)   3  Herniated nucleus pulposus, L4-5 left (722 10) (M51 26)   4  Left lumbar radiculopathy (724 4) (M54 16)   5  Leg pain (729 5) (M79 606)   6  Lumbar stenosis with neurogenic claudication (724 03) (M48 06)    Current Meds   1   Codeine Sulfate TABS;   Therapy: (Recorded:15May2017) to Recorded   2  LORazepam TABS; Therapy: (Recorded:15May2017) to Recorded   3  Lyrica 50 MG Oral Capsule; Take 1 pill at bed x 2 days, then 1 pill 2 x daily; Therapy: [id number] to (Evaluate:14Jun2017); Last Rx:15May2017 Ordered   4  PredniSONE 10 MG Oral Tablet;    Therapy: (Recorded:15May2017) to Recorded    Allergies    1  codeine    Signatures   Electronically signed by : Alyse Navas, ; May 18 2017  3:48PM EST                       (Author)

## 2018-01-12 NOTE — CONSULTS
Assessment   1  Herniated nucleus pulposus, L4-5 left (722 10) (M51 26)  2  Left lumbar radiculopathy (724 4) (M54 16)  3  Spondylolisthesis at L4-L5 level (756 12) (M43 16)    Plan  Herniated nucleus pulposus, L4-5 left, Left lumbar radiculopathy, Spondylolisthesis at  L4-L5 level    · Follow-up PRN Evaluation and Treatment  Follow-up  Status: Complete  Done:  [id number]  Ordered; For: Herniated nucleus pulposus, L4-5 left, Left lumbar radiculopathy,   Spondylolisthesis at L4-L5 level;  Ordered By: Guero Simmons  Performed:     Due: [id number]    Discussion/Summary    77-year-old woman with left lumbar radiculopathy, likely secondary to left L4-5 disc herniation in the setting of lumbar spondylolisthesis and stenosis  She has no signs or symptoms of neurogenic claudication  Her presentation is not typical for mechanical instability of the lumbar spine  The persistent pain in the left lower leg is somewhat concerning for more permanent neuropathic pain  I reviewed her history, physical examination and imaging in detail with her and her family today  A total of 30 minutes is spent with the patient which more and 50% was spent in direct counseling and coordination of care  She is tried a number of nonsurgical pain and strategies with limited improvement in her lower leg pain  She is a candidate for a left L4-5 minimally invasive microdiscectomy and bilateral laminotomy with possible epidural steroid injection  We also discussed possible fusion, though in the absence of clear mechanical back pain, a decompression alone would be reasonable  The goal of surgery is to relieve pressure neural structures and hopefully improve radicular pain and symptoms of neurogenic claudication  Weakness, numbness and back pain are less likely to improve  The risks of surgery were described in detail  1  Risk of general anesthetic with possible cardiac and respiratory complication  Risk of infection and bleeding    2  Risk of neurological injury with new pain, weakness or numbness in the legs or difficulties with bowel and bladder function  Risk of CSF leak was described  3  Possible need for additional lumbar spine surgery in the future  All her questions were answered to her satisfaction  She would like to take some time to consider her options  I reviewed the signs and symptoms of progressive lumbar radiculopathy and cauda equina syndrome with her today and asked her to contact my office should any concerns arise  At this point in time, no plans for further follow-up through this office  Pleased to see her again should she have additional questions or in surgery  She and her family are in agreement with this course of action  The patient has the current Goals: Improved pain control  The patent has the current Barriers: Possible chronic neuropathic pain  Patient is able to Self-Care  Self Referrals: No      Chief Complaint  New patient referred by Dr Mikayla Giraldo for low back pain with radiculopathy      History of Present Illness  Pleasant 63-year-old woman accompanied by her niece and   Approximately 9 weeks ago, while vacuuming, she developed acute onset left-sided leg pain  She currently denies any significant lower back pain  She describes left buttock pain which radiates into her hamstring and into her knee  However this pain comes and goes  Her most significant pain is 9-10/10 pain which she describes as aching and burning in the anterior left shin which radiates to the ankle  It does not radiate into the foot  It is not positional  She denies any associated weakness or numbness  She denies any pain, weakness or numbness in her right leg  She denies any difficulties with bowel and bladder function or change in perineal sensation  Lyrica and Aleve are somewhat helpful  She only underwent one physical therapy session   She hasn't undergone 3 lumbar epidural steroid injections which seemed to help the pain, more so in the upper leg and in the lower leg however  She presents today with an MRI of the lumbar spine in to discuss her surgical options  Review of Systems    Constitutional: feeling poorly and feeling tired, but no fever, no recent weight gain, no chills and no recent weight loss  Eyes: No complaints of eye pain, no red eyes, no eyesight problems, no discharge, no dry eyes, no itching of eyes  ENT: no complaints of earache, no loss of hearing, no nose bleeds, no nasal discharge, no sore throat, no hoarseness  Cardiovascular: No complaints of slow heart rate, no fast heart rate, no chest pain, no palpitations, no leg claudication, no lower extremity edema  Respiratory: No complaints of shortness of breath, no wheezing, no cough, no SOB on exertion, no orthopnea, no PND  Gastrointestinal: nausea and constipation, but no abdominal pain, no vomiting, no diarrhea and no blood in stools  Genitourinary: no dysuria, no pelvic pain, no vaginal discharge, no incontinence, no dysmenorrhea and no unexplained vaginal bleeding  Musculoskeletal: limb pain (Left leg), but no joint swelling, no myalgias, no joint stiffness and no limb swelling    The patient presents with complaints of arthralgias (Left buttock, left leg)  Integumentary: No complaints of skin rash or lesions, no itching, no skin wounds, no breast pain or lump  Neurological: limb weakness, but no headache, no numbness, no tingling, no confusion, no dizziness, no convulsions, no fainting and no difficulty walking  Psychiatric: sleep disturbances, but not suicidal, no anxiety, no personality change, no depression and no emotional problems  Endocrine: No complaints of proptosis, no hot flashes, no muscle weakness, no deepening of the voice, no feelings of weakness  Hematologic/Lymphatic: No complaints of swollen glands, no swollen glands in the neck, does not bleed easily, does not bruise easily  ROS reviewed  Active Problems   1  Acute left-sided low back pain (724 2) (M54 5)  2  Ambulatory dysfunction (719 7) (R26 2)  3  Herniated nucleus pulposus, L4-5 left (722 10) (M51 26)  4  Left lumbar radiculopathy (724 4) (M54 16)  5  Leg pain (729 5) (M79 606)  6  Lumbar stenosis with neurogenic claudication (724 03) (M48 06)    Past Medical History   1  History of Anxiety (300 00) (F41 9)  2  History of hypertension (V12 59) (Z86 79)    The active problems and past medical history were reviewed and updated today  Surgical History   1  History of Appendectomy  2  History of Cataract Surgery  3  History of Dilation And Curettage  4  History of Hysterectomy  5  History of Tonsillectomy    The surgical history was reviewed and updated today  Family History  Family History   1  Family history of Back disorder  2  Family history of heart disease (V17 49) (Z82 49)  3  Family history of hypertension (V17 49) (Z82 49)  4  Family history of malignant neoplasm (V16 9) (Z80 9)  5  Family history of stroke (V17 1) (Z82 3)    The family history was reviewed and updated today  Social History    ·    · Never a smoker   · No alcohol use  The social history was reviewed and updated today  Current Meds  1  Aleve 220 MG Oral Capsule; Take 1 capsule twice daily; Therapy: (Recorded:16Jun2017) to Recorded  2  B Complex Oral Tablet; TAKE 1 TABLET DAILY; Therapy: (Recorded:16Jun2017) to Recorded  3  Co Q10 100 MG Oral Capsule; take 1 capsule daily; Therapy: (Recorded:16Jun2017) to Recorded  4  Docusate Sodium 100 MG Oral Tablet; TAKE 1 TABLET Twice daily PRN; Therapy: (Recorded:16Jun2017) to Recorded  5  Fish Oil 1000 MG Oral Capsule; take 1 capsule daily; Therapy: (Recorded:16Jun2017) to Recorded  6  LORazepam 0 5 MG Oral Tablet; TAKE 1 TABLET Bedtime PRN; Therapy: (Recorded:16Jun2017) to Recorded  7  Lyrica 50 MG Oral Capsule; TAKE 1 CAPSULE 3 TIMES DAILY; Therapy: (Recorded:16Jun2017) to Recorded  8   Vitamin D3 1000 UNIT Oral Tablet; Take 1 tablet daily; Therapy: (Recorded:02Hoq8114) to Recorded    The medication list was reviewed and updated today  Allergies   1  codeine  2  Erythromycin TABS    Vitals  Vital Signs    Recorded: [id number] 11:04AM   Temperature 98 9 F   Heart Rate 80   Respiration 18   Systolic 356   Diastolic 68   Height 5 ft 1 in   Weight 144 lb 3 2 oz   BMI Calculated 27 25   BSA Calculated 1 64   Pain Scale 8     Physical Exam     Constitutional Patient appears the stated age  No signs of acute distress present  No involuntary movement  Patient is cooperative  Respiratory Auscultation of lungs: Clear to auscultation bilaterally  Cardiovascular Auscultation of heart: No extra sounds  Musculo: Spine Lumbar/Sacral Spine: Normal    Skin warm and dry  No rashes, lesions or ecchymosis  Neurologic - Mental Status: Alert and Oriented x3  Mood and affect: Affect is normal   Memory is intact  Motor System - Lower Extremities: 5/5 power in lower extremities  Muscle tone: Normal bilaterally  Muscle Bulk: Normal bilaterally  Reflexes:   Reflexes: Abnormal   Deep tendon reflexes: 0 right patella, 0 left patella, 0 right ankle jerk and 0 left ankle jerk  Superficial/Primitive Reflexes: Babinski reflex absent on the right and Babinski reflex absent on the left  Coordination: Finger to nose was normal    Sensory: Sensation grossly intact to light touch  Gait and Station: Able to heal toe gait and Romberg negative  Results/Data    I personally reviewed the mri in detail with the patient  MRI Review MRI lumbar spine without contrast dated May 13, 2017  Mild degenerative scoliosis  Grade 1 degenerative anterolisthesis of L4 on L5  Multiple levels of degenerative disc disease and facet arthropathy  Moderate right foraminal stenosis at L5-S1 secondary to degenerative changes  At L4-5 there is severe central canal stenosis and right greater than left foraminal stenosis secondary to degenerative changes  There is a small left paracentral disc herniation at L4-5 as well  Mild central and biforaminal stenosis at L34  No other areas of significant neural compression  Intrathecal contents unremarkable        Future Appointments    Date/Time Provider Specialty Site   07/07/2017 11:30 AM [facility] [address]     Signatures   Electronically signed by : VANESSA Thomas ; Jun 16 2017  1:30PM EST                       (Author)    Electronically signed by : VANESSA Thomas ; Jun 19 2017  9:58AM EST

## 2018-01-13 VITALS
DIASTOLIC BLOOD PRESSURE: 82 MMHG | TEMPERATURE: 98.5 F | SYSTOLIC BLOOD PRESSURE: 138 MMHG | WEIGHT: 134.19 LBS | HEIGHT: 61 IN | BODY MASS INDEX: 25.34 KG/M2 | HEART RATE: 80 BPM

## 2018-01-14 VITALS
TEMPERATURE: 97.8 F | BODY MASS INDEX: 24.55 KG/M2 | HEART RATE: 82 BPM | DIASTOLIC BLOOD PRESSURE: 62 MMHG | SYSTOLIC BLOOD PRESSURE: 118 MMHG | HEIGHT: 61 IN | WEIGHT: 130 LBS

## 2018-01-14 VITALS
HEIGHT: 61 IN | WEIGHT: 133.19 LBS | SYSTOLIC BLOOD PRESSURE: 130 MMHG | BODY MASS INDEX: 25.14 KG/M2 | TEMPERATURE: 98.3 F | DIASTOLIC BLOOD PRESSURE: 80 MMHG | HEART RATE: 76 BPM

## 2018-01-14 VITALS
HEART RATE: 80 BPM | SYSTOLIC BLOOD PRESSURE: 128 MMHG | TEMPERATURE: 98.9 F | BODY MASS INDEX: 27.23 KG/M2 | HEIGHT: 61 IN | RESPIRATION RATE: 18 BRPM | WEIGHT: 144.2 LBS | DIASTOLIC BLOOD PRESSURE: 68 MMHG

## 2018-01-14 NOTE — MISCELLANEOUS
Message   Recorded as Task   Date: 05/19/2017 08:10 AM, Created By: Omaira Poe   Task Name: Miscellaneous   Assigned To: [facility] [place] [facility]   Regarding Patient: Max Turner, Status: Active   CommentDanisha Chowdary - 19 May 2017 8:10 AM     TASK CREATED  Pt called and wants to know when she do do physical therapy post injection  Please call [phone]  Mehran Coronado - 19 May 2017 11:00 AM     TASK EDITED  s/p LT L4-5 TFESI #1 on 5/18  S/w pt, states she is "sore and tired" today  Advised pt, it is normal to have changes in your pain x 3-5 days post procedure  ok to use ice / heat and rest, resume normal activities as tolerated  Confirmed w/ pt, PT is not a "normal activity" for her  ***Advised pt, allow time for the steroid to work ~ 5 - 7 days before attempting PT  This office will cb to fu in 1 week  Advised pt, will confirm w/ SL and cb if there is anything additional or differnt  Pt verbalized understanding and appreciation  Maldonado Obando - 19 May 2017 11:16 AM     TASK REPLIED TO: Previously Assigned To Maldonado Obando  agree        Active Problems    1  Acute left-sided low back pain (724 2) (M54 5)   2  Ambulatory dysfunction (719 7) (R26 2)   3  Herniated nucleus pulposus, L4-5 left (722 10) (M51 26)   4  Left lumbar radiculopathy (724 4) (M54 16)   5  Leg pain (729 5) (M79 606)   6  Lumbar stenosis with neurogenic claudication (724 03) (M48 06)    Current Meds   1  Codeine Sulfate TABS; Therapy: (Recorded:68Dvt1108) to Recorded   2  LORazepam TABS; Therapy: (Recorded:98Zbc1490) to Recorded   3  Lyrica 50 MG Oral Capsule; Take 1 pill at bed x 2 days, then 1 pill 2 x daily; Therapy: [id number] to (Evaluate:68Pzq7532); Last Rx:[id number] Ordered   4  PredniSONE 10 MG Oral Tablet;    Therapy: (Recorded:29Qof6563) to Recorded    Allergies    1  codeine    Signatures   Electronically signed by : Kelsie Hughes, ; May 19 2017 11:34AM EST                       (Author)

## 2018-01-15 NOTE — MISCELLANEOUS
Message   Recorded as Task   Date: 06/19/2017 08:23 AM, Created By: Mallory Nichole   Task Name: Miscellaneous   Assigned To: [facility] [place] [facility]   Regarding Patient: Chace Lee, Status: Active   CommentElizebeth Heal - 19 Jun 2017 8:23 AM     TASK CREATED  Pt called and stated she is having side effects from the Lyrica  Had swelling in the left foot, constipation, sick in her stomach and was cold she had to wear a winter jacket  Please call [phone]  Mehran Coronado - 19 Jun 2017 12:08 PM     TASK EDITED  LM to cb w/ pt's   Provided cb number  Advised Mr Chandan Herrera, please have the pt ask to be tx'd to the nurse when she calls back  Mr Chandan Herrera verbalized understanding and appreciation  Nupur Lin - 20 Jun 2017 8:52 AM     TASK EDITED  Pt left message with [employer] on 6/19 at 2:22 for [facility] stating "sorry I missed your call  Pls call back" at [phone]  Mehran Coronado - 20 Jun 2017 9:47 AM     TASK EDITED  s/w pt, states her L leg pain is excruciating  L foot is swollen - relieved w/ elevation  Sleeping 3 hrs/ day r/t pain  Nauseous and constipated x 1 week  + relief w/ dulcolax  Nausea and stomach pain continue  Pt feels her symptoms may be r/t lyrica 50 mg tid  started in mid may, no issues w/ lyrica previously  Per pt, "I've been taking it that way since I started "    Pt confirmed LESI #2 on 6/15 w/ no relief  Pt is requesting something to help w/ the pain / help her sleep  States she has not taken any lyrica x 2 days r/t syptoms  Advised pt, it is possible that the lyrica is not causing these symptoms - kvng since she has had no issue x 1 mo and no improvement since stopping lyrica  This office does not presribe sleep aids  Could be changes in your pain - as discussed w/ LESI dc instructions  Per pt, no relief w/ ice or heat  Advised pt, will d/w DG and cb to advise     Real Calabrese - 20 Jun 2017 3:05 PM     TASK REPLIED TO: Previously Assigned To Real Calabrese  At this point she should just stay off of the Lyrica and either go to her PCP or the ER as I don't know why her leg is swelling as there could be lots of reasons and ultimately it needs to be evaluated as we know that she is a surgical candidate and most likely there is nothing we can do to make it better since nothing we have done works  Mehran Coronado - 20 Jun 2017 3:54 PM     TASK EDITED  s/w pt's , molina, per release of info on file  Advised of above  Molina verbalized understanding and appreciation  Will relay the info and cb w/ any qustions or concerns  Active Problems    1  Acute left-sided low back pain (724 2) (M54 5)   2  Ambulatory dysfunction (719 7) (R26 2)   3  Arthritis (716 90) (M19 90)   4  Heart burn (787 1) (R12)   5  Herniated nucleus pulposus, L4-5 left (722 10) (M51 26)   6  Left lumbar radiculopathy (724 4) (M54 16)   7  Leg pain (729 5) (M79 606)   8  Lumbar stenosis with neurogenic claudication (724 03) (M48 06)   9  Osteoporosis (733 00) (M81 0)   10  Seasonal allergies (477 9) (J30 2)   11  Spondylolisthesis at L4-L5 level (756 12) (M43 16)   12  Wears glasses (V49 89) (Z97 3)    Current Meds   1  Aleve 220 MG Oral Capsule; Take 1 capsule twice daily; Therapy: (Recorded:16Jun2017) to Recorded   2  B Complex Oral Tablet; TAKE 1 TABLET DAILY; Therapy: (Recorded:16Jun2017) to Recorded   3  Co Q10 100 MG Oral Capsule; take 1 capsule daily; Therapy: (Recorded:16Jun2017) to Recorded   4  Docusate Sodium 100 MG Oral Tablet; TAKE 1 TABLET Twice daily PRN; Therapy: (Recorded:16Jun2017) to Recorded   5  Fish Oil 1000 MG Oral Capsule; take 1 capsule daily; Therapy: (Recorded:16Jun2017) to Recorded   6  LORazepam 0 5 MG Oral Tablet; TAKE 1 TABLET Bedtime PRN; Therapy: (Recorded:16Jun2017) to Recorded   7  Lyrica 50 MG Oral Capsule; TAKE 1 CAPSULE 3 TIMES DAILY; Therapy: (Recorded:16Jun2017) to Recorded   8  Vitamin D3 1000 UNIT Oral Tablet; Take 1 tablet daily;    Therapy: (Recorded:16Jun2017) to Recorded    Allergies    1  codeine   2   Erythromycin TABS    Signatures   Electronically signed by : Houston Funez, ; Jun 20 2017  3:54PM EST                       (Author)

## 2018-01-15 NOTE — MISCELLANEOUS
Message   Recorded as Task   Date: 06/16/2017 08:14 AM, Created By: Jina Gordon   Task Name: Miscellaneous   Assigned To: [facility] [place] [facility]   Regarding Patient: Liliana Gannon, Status: Active   Comment:    Jina Gordon - 16 Jun 2017 8:14 AM     TASK CREATED  pt had procedure yesterday and forgot to ask if she still needs to take her lyrica and she says she is very low on it  please call pt back at [phone]   Mehran Coronado - 16 Jun 2017 9:22 AM     TASK EDITED  s/w pt, questioned if she should continue taking lyrica since she had an injection yesterday  Advised pt, continue lyrica as prescribed, this medication should not be stopped abruptly  pt confirmed lyrica 50 mg, 1 tab po tid w/ "sometimes yes, sometimes no" relief  No se's  Per pt, dizziness w/ qid  Per pt, she gets samples from this office  Advised pt, will d/w Dr Keeley Colon  Anticipate samples will be at the desk for pu after 12 today  Will cb if there is any issue with that plan  Pt stated taht she had + relief yesterday and last night w/ her injection  Advised pt that the steroid will take 3-5 days for relief, ok to use ice / heat, rest, take medications as prescribed, this office will cb in 1 week or an update, please cb w/ questions or concerns  pt verbalized understanding and appreciation for [facility], was very complimentary of the care she has received and the staff  Asked that the writer relay that to the office  *** OK to leave Lyrica, 50 mg samples at the  to get to 7/7/2017 ov w/ dg? 4 boxes? Maldonado Obando - 16 Jun 2017 9:26 AM     TASK REPLIED TO: Previously Assigned To Maldonado Obando  yes   Mehran Coronado - 16 Jun 2017 11:31 AM     TASK EDITED  At the  for pu, recorded in allscripts  SIG to be scanned to chart; 1 tab po tid #21 x 4 pks (84 tabs)        Active Problems    1  Acute left-sided low back pain (724 2) (M54 5)   2  Ambulatory dysfunction (719 7) (R26 2)   3   Herniated nucleus pulposus, L4-5 left (722 10) (M51 26)   4  Left lumbar radiculopathy (724 4) (M54 16)   5  Leg pain (729 5) (M79 606)   6  Lumbar stenosis with neurogenic claudication (724 03) (M48 06)    Current Meds   1  Aleve 220 MG Oral Capsule; Take 1 capsule twice daily; Therapy: (Recorded:16Jun2017) to Recorded   2  B Complex Oral Tablet; TAKE 1 TABLET DAILY; Therapy: (Recorded:16Jun2017) to Recorded   3  Co Q10 100 MG Oral Capsule; take 1 capsule daily; Therapy: (Recorded:16Jun2017) to Recorded   4  Docusate Sodium 100 MG Oral Tablet; TAKE 1 TABLET Twice daily PRN; Therapy: (Recorded:16Jun2017) to Recorded   5  Fish Oil 1000 MG Oral Capsule; take 1 capsule daily; Therapy: (Recorded:16Jun2017) to Recorded   6  LORazepam 0 5 MG Oral Tablet; TAKE 1 TABLET Bedtime PRN; Therapy: (Recorded:16Jun2017) to Recorded   7  Lyrica 50 MG Oral Capsule; TAKE 1 CAPSULE 3 TIMES DAILY; Therapy: (Recorded:16Jun2017) to Recorded   8  Vitamin D3 1000 UNIT Oral Tablet; Take 1 tablet daily; Therapy: (Recorded:16Jun2017) to Recorded    Allergies    1  codeine   2   Erythromycin TABS    Signatures   Electronically signed by : Keyla Grimes, ; Jun 16 2017 11:31AM EST                       (Author)

## 2018-01-16 NOTE — MISCELLANEOUS
Message   Recorded as Task   Date: 05/24/2017 08:12 AM, Created By: Wendy Pop   Task Name: Miscellaneous   Assigned To: [facility]   Regarding Patient: Liliana Gannon, Status: In Progress   Comment:    Nupur Lin - 24 May 2017 8:12 AM     TASK CREATED  Pt called following her proc on 5/18  She said she had no relief -- she said in fact some days it is higher than a 10 -- a 15  She said she only had 1/2 day until today when she had some relief  She said she is getting no sleep at all  She is asking if she should take more Lyrica for pain  Pls call pt at home # [phone]  She will be home all day  (I was unable to use prior tasks)  Ayleen Rai - 24 May 2017 10:12 AM     TASK IN PROGRESS   Ayleen Rai - 24 May 2017 10:20 AM     TASK EDITED  S/W pt who is s/p Left L4-L5 TFESI from 5/18 and reports no relief since inj  She said some days the pain has even been worse than before inj  Her Left LB and leg pain is >10  Pt said she doesn't sleep, it burns, throbs and hurts she can't take it any more  Pt takes Lyrica 50mg BID and asking if that can be increased? I told pt it does take 2 wks to see the full effect  I confirmed she is sched for inj #2 for 6/1  Told pt I will make Dr Keeley Colon aware and someone will call her back with his rec  Maldonado Obando - 24 May 2017 10:27 AM     TASK REPLIED TO: Previously Assigned To Maldonado Obando  can increase Lyrica to 50 t i d  if no side effects and please change second injection to LESI   Ayleen Rai - 24 May 2017 10:29 AM     TASK IN PROGRESS   Ayleen Rai - 24 May 2017 10:41 AM     TASK EDITED  S/W pt, she denies having any s/e from the Lyrica, she was advised then to increase her Lyrica to 3 times a day per Dr Keeley Colon  Pt advised she will need to call the office 2 days before she runs out for refill  I explained that Dr Keeley Colon is going to slightly change the inj he does on 6/1 to a LESI  Active Problems    1   Acute left-sided low back pain (724 2) (M54 5)   2  Ambulatory dysfunction (719 7) (R26 2)   3  Herniated nucleus pulposus, L4-5 left (722 10) (M51 26)   4  Left lumbar radiculopathy (724 4) (M54 16)   5  Leg pain (729 5) (M79 606)   6  Lumbar stenosis with neurogenic claudication (724 03) (M48 06)    Current Meds   1  Codeine Sulfate TABS; Therapy: (Recorded:25Zaj9761) to Recorded   2  LORazepam TABS; Therapy: (Recorded:89Brl9634) to Recorded   3  Lyrica 50 MG Oral Capsule; Take 1 pill at bed x 2 days, then 1 pill 2 x daily; Therapy: [id number] to (Evaluate:59Xpm6017); Last Rx:15May2017 Ordered   4  PredniSONE 10 MG Oral Tablet;    Therapy: (Recorded:68Ard3594) to Recorded    Allergies    1  codeine    Signatures   Electronically signed by : Elham Dan, ; May 24 2017 10:41AM EST                       (Author)

## 2018-01-18 NOTE — MISCELLANEOUS
Message   Recorded as Task   Date: 05/30/2017 08:09 AM, Created By: Harpreet Brownlee   Task Name: Miscellaneous   Assigned To: [facility]   Regarding Patient: Judi Amaro, Status: Active   CommentClydedovinny Dutta - 30 May 2017 8:09 AM     TASK CREATED  Pt called with questions:   1) What type of sleeping pill can she take to help her sleep since she hasn't slept for 2 days? 2)Can she increase her Lyrica by 50mg to help her with the pain? 3)Can she take Ativan 1 hr before her procedure on 6/1? 4)Can she get a Lyrica refill? Mehran Coronado - 30 May 2017 11:19 AM     TASK EDITED  s/w pt's , ness, per release of info on file  Woke pt from sleep to discuss  Pt states she had + relief x 2 days w/ increase in Lyrica  Per pt, taking lyrica 50 mg, 1 tab am, 1 tab pm, 2 tabs hs  Pt denies se's, no relief at this time  Per pt, #8 pills on hand  Requesting more samples  Per pt, she has ativan on hand  Believes it is 0 5 mg  Questioning if she can take 1 pill 1 hr before procedure, repeat 30 min later prn  Advised pt, will d/w DG and cb to advise  Confirmed pt is holding NSAIDS for procedure on 6/1  Advised pt, ok to take tylenol unless otherwise contraindicated  Ok to use topical otc - bio freeze / icy hot per package instructios  Advised pt, this office does not rx sleep aids, would recommend d/w PCP or go to ED if pain is unmanageable  Will discuss above w/ DG and cb to advise  pt verbalized understanding and appreciation  Real Calabrese - 30 May 2017 2:30 PM     TASK REPLIED TO: Previously Assigned To Real Calabrese  Ativan before procedure is ok to relax her  Continue the Lyrica as presribed and samples are upfront for pickup  Did they schedule the surgical eval yet? Mehran Coronado - 30 May 2017 3:34 PM     TASK EDITED  S/w pt, advised of above  Pt states she was not aware of surgical evals  Advised pt, s/w , ness last week - provided surgoeon's names and contact info   Orders are printed  Pt verbalized understanding  States she will fu  Reviewed pre proedure instructions; eat a light meal, npo 1 hour prior, , loose ftting clothing, cb if illness / abx start prior to procedure, no aleve / nsaids at this point  Tylenol is ok unless otherwise contraindicated  Ativan 1 tab 1 hr prior to procedure, repeat 30 min later prn  Pt must have   pt verbalized understanding and appreciation  Active Problems    1  Acute left-sided low back pain (724 2) (M54 5)   2  Ambulatory dysfunction (719 7) (R26 2)   3  Herniated nucleus pulposus, L4-5 left (722 10) (M51 26)   4  Left lumbar radiculopathy (724 4) (M54 16)   5  Leg pain (729 5) (M79 606)   6  Lumbar stenosis with neurogenic claudication (724 03) (M48 06)    Current Meds   1  Codeine Sulfate TABS; Therapy: (Recorded:81Sme8754) to Recorded   2  LORazepam TABS; Therapy: (Recorded:52Uqg2874) to Recorded   3  Lyrica 50 MG Oral Capsule; Take 1 pill at bed x 2 days, then 1 pill 2 x daily; Therapy: 16XCB1741 to (Evaluate:14Jun2017); Last Rx:60Yer7191 Ordered   4  PredniSONE 10 MG Oral Tablet;    Therapy: (Recorded:92Bbc8330) to Recorded    Allergies    1  codeine    Signatures   Electronically signed by : Tracey Mcgee, ; May 30 2017  3:35PM EST                       (Author)

## 2018-01-23 VITALS
TEMPERATURE: 99.5 F | HEIGHT: 61 IN | SYSTOLIC BLOOD PRESSURE: 142 MMHG | DIASTOLIC BLOOD PRESSURE: 80 MMHG | BODY MASS INDEX: 25.11 KG/M2 | WEIGHT: 133 LBS | HEART RATE: 104 BPM | OXYGEN SATURATION: 98 % | RESPIRATION RATE: 20 BRPM

## 2018-02-02 ENCOUNTER — OFFICE VISIT (OUTPATIENT)
Dept: PHYSICAL THERAPY | Facility: MEDICAL CENTER | Age: 83
End: 2018-02-02
Payer: COMMERCIAL

## 2018-02-02 DIAGNOSIS — G89.29 CHRONIC BILATERAL LOW BACK PAIN WITHOUT SCIATICA: Primary | ICD-10-CM

## 2018-02-02 DIAGNOSIS — M54.50 CHRONIC BILATERAL LOW BACK PAIN WITHOUT SCIATICA: Primary | ICD-10-CM

## 2018-02-02 PROCEDURE — 97012 MECHANICAL TRACTION THERAPY: CPT | Performed by: PHYSICAL THERAPIST

## 2018-02-02 NOTE — PROGRESS NOTES
Daily Note     Today's date: 2018  Patient name: Hallie Sanchez  : 1934  MRN: [id number]  Referring provider: BETZAIDA Wu  Dx:   Encounter Diagnosis   Name Primary?  Chronic bilateral low back pain without sciatica Yes                  Subjective: Patient states that she is feeling good having no symptoms at this time  Objective: See treatment diary below  Precautions none    Specialty Daily Treatment Diary     Modalities 2/2       Mechanical traction 15 min                                   Assessment: Tolerated treatment well  Patient demonstrated fatigue post treatment, exhibited good technique with therapeutic exercises and would benefit from continued PT      Plan: Continue per plan of care

## 2018-02-26 ENCOUNTER — OFFICE VISIT (OUTPATIENT)
Dept: PHYSICAL THERAPY | Facility: MEDICAL CENTER | Age: 83
End: 2018-02-26
Payer: COMMERCIAL

## 2018-02-26 DIAGNOSIS — M54.50 CHRONIC BILATERAL LOW BACK PAIN WITHOUT SCIATICA: Primary | ICD-10-CM

## 2018-02-26 DIAGNOSIS — G89.29 CHRONIC BILATERAL LOW BACK PAIN WITHOUT SCIATICA: Primary | ICD-10-CM

## 2018-02-26 PROCEDURE — 97012 MECHANICAL TRACTION THERAPY: CPT | Performed by: PHYSICAL THERAPIST

## 2018-02-26 NOTE — PROGRESS NOTES
Daily Note     Today's date: 2018  Patient name: Ernst Sterling  : 1934  MRN: [id number]  Referring provider: BETZAIDA Dunlap  Dx:   Encounter Diagnosis   Name Primary?  Chronic bilateral low back pain without sciatica Yes                  Subjective: Patient states that she is feeling good having no symptoms at this time  Objective: See treatment diary below  Precautions none    Specialty Daily Treatment Diary     Modalities       Mechanical traction 15 min 15                                  Assessment: Tolerated treatment well  Patient demonstrated fatigue post treatment, exhibited good technique with therapeutic exercises and would benefit from continued PT      Plan: Continue per plan of care

## 2018-03-19 ENCOUNTER — OFFICE VISIT (OUTPATIENT)
Dept: PHYSICAL THERAPY | Facility: MEDICAL CENTER | Age: 83
End: 2018-03-19
Payer: COMMERCIAL

## 2018-03-19 DIAGNOSIS — M54.50 CHRONIC BILATERAL LOW BACK PAIN WITHOUT SCIATICA: Primary | ICD-10-CM

## 2018-03-19 DIAGNOSIS — G89.29 CHRONIC BILATERAL LOW BACK PAIN WITHOUT SCIATICA: Primary | ICD-10-CM

## 2018-03-19 PROCEDURE — 97012 MECHANICAL TRACTION THERAPY: CPT | Performed by: PHYSICAL THERAPIST

## 2018-03-19 NOTE — PROGRESS NOTES
Daily Note     Today's date: 3/19/2018  Patient name: Charity Burkett  : 1934  MRN: [id number]  Referring provider: BETZAIDA Leong  Dx:   Encounter Diagnosis   Name Primary?  Chronic bilateral low back pain without sciatica Yes                  Subjective: Patient states that she is feeling good having no symptoms at this time  Objective: See treatment diary below  Precautions none    Specialty Daily Treatment Diary     Modalities 3/19       Mechanical traction 15 min                                   Assessment: Tolerated treatment well  Patient demonstrated fatigue post treatment, exhibited good technique with therapeutic exercises and would benefit from continued PT      Plan: Continue per plan of care

## 2018-04-09 ENCOUNTER — OFFICE VISIT (OUTPATIENT)
Dept: PHYSICAL THERAPY | Facility: MEDICAL CENTER | Age: 83
End: 2018-04-09
Payer: COMMERCIAL

## 2018-04-09 DIAGNOSIS — G89.29 CHRONIC BILATERAL LOW BACK PAIN WITHOUT SCIATICA: Primary | ICD-10-CM

## 2018-04-09 DIAGNOSIS — M54.50 CHRONIC BILATERAL LOW BACK PAIN WITHOUT SCIATICA: Primary | ICD-10-CM

## 2018-04-09 PROCEDURE — 97012 MECHANICAL TRACTION THERAPY: CPT | Performed by: PHYSICAL THERAPIST

## 2018-04-09 NOTE — PROGRESS NOTES
Daily Note     Today's date: 2018  Patient name: Zenaida Vega  : 1934  MRN: [id number]  Referring provider: BETZAIDA Fagan  Dx:   Encounter Diagnosis   Name Primary?  Chronic bilateral low back pain without sciatica Yes                  Subjective: Patient states that she is feeling good having no symptoms at this time  Notices that the weather seems to effect her legs  Objective: See treatment diary below  Precautions none    Specialty Daily Treatment Diary     Modalities        Mechanical traction 15 min                                   Assessment: Tolerated treatment well  Patient demonstrated fatigue post treatment, exhibited good technique with therapeutic exercises and would benefit from continued PT      Plan: Continue per plan of care

## 2018-04-27 ENCOUNTER — APPOINTMENT (OUTPATIENT)
Dept: PHYSICAL THERAPY | Facility: MEDICAL CENTER | Age: 83
End: 2018-04-27
Payer: COMMERCIAL

## 2018-05-01 ENCOUNTER — OFFICE VISIT (OUTPATIENT)
Dept: PHYSICAL THERAPY | Facility: MEDICAL CENTER | Age: 83
End: 2018-05-01
Payer: COMMERCIAL

## 2018-05-01 DIAGNOSIS — G89.29 CHRONIC BILATERAL LOW BACK PAIN WITHOUT SCIATICA: Primary | ICD-10-CM

## 2018-05-01 DIAGNOSIS — M54.50 CHRONIC BILATERAL LOW BACK PAIN WITHOUT SCIATICA: Primary | ICD-10-CM

## 2018-05-01 PROCEDURE — 97012 MECHANICAL TRACTION THERAPY: CPT | Performed by: PHYSICAL THERAPIST

## 2018-05-01 NOTE — PROGRESS NOTES
Daily Note     Today's date: 2018  Patient name: Maurice Sy  : 1934  MRN: [id number]  Referring provider: BETZAIDA Dye  Dx:   Encounter Diagnosis   Name Primary?  Chronic bilateral low back pain without sciatica Yes                  Subjective: Patient states that she is feeling good having no symptoms at this time  Notices that the weather seems to effect her legs  Patient states that she enjoys coming for her traction treatment and is happy to be paying out of pocket for it  Objective: See treatment diary below  Precautions none    Specialty Daily Treatment Diary     Modalities        Mechanical traction 15 min                                   Assessment: Tolerated treatment well  Patient demonstrated fatigue post treatment, exhibited good technique with therapeutic exercises and would benefit from continued PT      Plan: Continue per plan of care

## 2018-05-29 ENCOUNTER — OFFICE VISIT (OUTPATIENT)
Dept: PHYSICAL THERAPY | Facility: MEDICAL CENTER | Age: 83
End: 2018-05-29
Payer: COMMERCIAL

## 2018-05-29 DIAGNOSIS — M54.50 CHRONIC BILATERAL LOW BACK PAIN WITHOUT SCIATICA: Primary | ICD-10-CM

## 2018-05-29 DIAGNOSIS — G89.29 CHRONIC BILATERAL LOW BACK PAIN WITHOUT SCIATICA: Primary | ICD-10-CM

## 2018-05-29 PROCEDURE — 97012 MECHANICAL TRACTION THERAPY: CPT | Performed by: PHYSICAL THERAPIST

## 2018-05-29 NOTE — PROGRESS NOTES
Daily Note     Today's date: 2018  Patient name: Nahed Long  : 1934  MRN: [id number]  Referring provider: BETZAIDA Potts  Dx:   Encounter Diagnosis   Name Primary?  Chronic bilateral low back pain without sciatica Yes                  Subjective: Patient states that she is feeling good having no symptoms at this time  Notices that the weather seems to effect her legs  Patient states that she enjoys coming for her traction treatment and is happy to be paying out of pocket for it  Objective: See treatment diary below  Precautions none    Specialty Daily Treatment Diary     Modalities        Mechanical traction 15 min                                   Assessment: Tolerated treatment well  Patient demonstrated fatigue post treatment, exhibited good technique with therapeutic exercises and would benefit from continued PT      Plan: Continue per plan of care

## 2018-06-26 ENCOUNTER — OFFICE VISIT (OUTPATIENT)
Dept: PHYSICAL THERAPY | Facility: MEDICAL CENTER | Age: 83
End: 2018-06-26
Payer: COMMERCIAL

## 2018-06-26 DIAGNOSIS — M54.50 CHRONIC BILATERAL LOW BACK PAIN WITHOUT SCIATICA: Primary | ICD-10-CM

## 2018-06-26 DIAGNOSIS — G89.29 CHRONIC BILATERAL LOW BACK PAIN WITHOUT SCIATICA: Primary | ICD-10-CM

## 2018-06-26 PROCEDURE — 97012 MECHANICAL TRACTION THERAPY: CPT | Performed by: PHYSICAL THERAPIST

## 2018-06-26 NOTE — PROGRESS NOTES
Daily Note     Today's date: 2018  Patient name: Rosita Gandara  : 1934  MRN: [id number]  Referring provider: BETZAIDA Gray  Dx:   Encounter Diagnosis   Name Primary?  Chronic bilateral low back pain without sciatica Yes                  Subjective: Patient states that she is feeling good having no symptoms at this time  Notices that the weather seems to effect her legs  Patient states that she enjoys coming for her traction treatment and is happy to be paying out of pocket for it  Objective: See treatment diary below  Precautions none    Specialty Daily Treatment Diary     Modalities        Mechanical traction 15 min                                   Assessment: Tolerated treatment well  Patient demonstrated fatigue post treatment, exhibited good technique with therapeutic exercises and would benefit from continued PT      Plan: Continue per plan of care

## 2018-07-16 ENCOUNTER — APPOINTMENT (OUTPATIENT)
Dept: PHYSICAL THERAPY | Facility: MEDICAL CENTER | Age: 83
End: 2018-07-16
Payer: COMMERCIAL

## 2018-07-19 ENCOUNTER — OFFICE VISIT (OUTPATIENT)
Dept: PHYSICAL THERAPY | Facility: MEDICAL CENTER | Age: 83
End: 2018-07-19
Payer: COMMERCIAL

## 2018-07-19 DIAGNOSIS — M54.50 CHRONIC BILATERAL LOW BACK PAIN WITHOUT SCIATICA: Primary | ICD-10-CM

## 2018-07-19 DIAGNOSIS — G89.29 CHRONIC BILATERAL LOW BACK PAIN WITHOUT SCIATICA: Primary | ICD-10-CM

## 2018-07-19 PROCEDURE — 97012 MECHANICAL TRACTION THERAPY: CPT | Performed by: PHYSICAL THERAPIST

## 2018-07-19 NOTE — PROGRESS NOTES
Daily Note     Today's date: 2018  Patient name: Trinity Patino  : 1934  MRN: [id number]  Referring provider: BETZAIDA Daniels  Dx:   Encounter Diagnosis   Name Primary?  Chronic bilateral low back pain without sciatica Yes                  Subjective: Patient states that she is feeling some increase in her lower back discomfort because the last 2 weeks she has been having to do more for her   Objective: See treatment diary below  Precautions none    Specialty Daily Treatment Diary     Modalities        Mechanical traction 15 min                                   Assessment: Tolerated treatment well  Patient demonstrated fatigue post treatment, exhibited good technique with therapeutic exercises and would benefit from continued PT      Plan: Continue per plan of care

## 2018-08-07 ENCOUNTER — OFFICE VISIT (OUTPATIENT)
Dept: PHYSICAL THERAPY | Facility: MEDICAL CENTER | Age: 83
End: 2018-08-07
Payer: COMMERCIAL

## 2018-08-07 DIAGNOSIS — M54.50 CHRONIC BILATERAL LOW BACK PAIN WITHOUT SCIATICA: Primary | ICD-10-CM

## 2018-08-07 DIAGNOSIS — G89.29 CHRONIC BILATERAL LOW BACK PAIN WITHOUT SCIATICA: Primary | ICD-10-CM

## 2018-08-07 PROCEDURE — 97012 MECHANICAL TRACTION THERAPY: CPT | Performed by: PHYSICAL THERAPIST

## 2018-08-07 NOTE — PROGRESS NOTES
Daily Note     Today's date: 2018  Patient name: Elijah Pedraza  : 1934  MRN: [id number]  Referring provider: BETZAIDA Herrera  Dx:   Encounter Diagnosis   Name Primary?  Chronic bilateral low back pain without sciatica Yes                  Subjective: Patient states that she is feeling some increase in leg soreness as she has been caring for her  more than usual as his health declines  Objective: See treatment diary below  Precautions none    Specialty Daily Treatment Diary     Modalities        Mechanical traction 15 min                                   Assessment: Tolerated treatment well  Patient demonstrated fatigue post treatment, exhibited good technique with therapeutic exercises and would benefit from continued PT      Plan: Continue per plan of care

## 2018-08-28 ENCOUNTER — OFFICE VISIT (OUTPATIENT)
Dept: PHYSICAL THERAPY | Facility: MEDICAL CENTER | Age: 83
End: 2018-08-28
Payer: COMMERCIAL

## 2018-08-28 DIAGNOSIS — M54.50 CHRONIC BILATERAL LOW BACK PAIN WITHOUT SCIATICA: Primary | ICD-10-CM

## 2018-08-28 DIAGNOSIS — G89.29 CHRONIC BILATERAL LOW BACK PAIN WITHOUT SCIATICA: Primary | ICD-10-CM

## 2018-08-28 PROCEDURE — 97012 MECHANICAL TRACTION THERAPY: CPT | Performed by: PHYSICAL THERAPIST

## 2018-08-28 NOTE — PROGRESS NOTES
Daily Note     Today's date: 2018  Patient name: Adama Singh  : 1934  MRN: [id number]  Referring provider: BETZAIDA Galloway  Dx:   Encounter Diagnosis   Name Primary?  Chronic bilateral low back pain without sciatica Yes                  Subjective: Patient states that her leg soreness continues to fluctuate, but the traction really seems to help  Objective: See treatment diary below  Precautions none    Specialty Daily Treatment Diary     Modalities        Supine mechanical traction (30:5s; 15 min; 3 step ramp) 75:10#                                   Assessment: Tolerated treatment well  Patient demonstrated fatigue post treatment, exhibited good technique with therapeutic exercises and would benefit from continued PT      Plan: Continue per plan of care

## 2018-09-18 ENCOUNTER — OFFICE VISIT (OUTPATIENT)
Dept: PHYSICAL THERAPY | Facility: MEDICAL CENTER | Age: 83
End: 2018-09-18
Payer: COMMERCIAL

## 2018-09-18 DIAGNOSIS — G89.29 CHRONIC BILATERAL LOW BACK PAIN WITHOUT SCIATICA: Primary | ICD-10-CM

## 2018-09-18 DIAGNOSIS — M54.50 CHRONIC BILATERAL LOW BACK PAIN WITHOUT SCIATICA: Primary | ICD-10-CM

## 2018-09-18 PROCEDURE — 97012 MECHANICAL TRACTION THERAPY: CPT | Performed by: PHYSICAL THERAPIST

## 2018-09-18 NOTE — PROGRESS NOTES
Daily Note     Today's date: 2018  Patient name: Beverly Orellana  : 1934  MRN: [id number]  Referring provider: BETZAIDA Aguilera  Dx:   Encounter Diagnosis   Name Primary?  Chronic bilateral low back pain without sciatica Yes                  Subjective: Patient states that she is doing well today and notes that her activities are improved      Objective: See treatment diary below  Precautions none    Specialty Daily Treatment Diary     Modalities        Supine mechanical traction (30:5s; 15 min; 3 step ramp) 75:10#                                   Assessment: Tolerated treatment well  Patient demonstrated fatigue post treatment, exhibited good technique with therapeutic exercises and would benefit from continued PT      Plan: Continue per plan of care

## 2018-10-09 ENCOUNTER — OFFICE VISIT (OUTPATIENT)
Dept: PHYSICAL THERAPY | Facility: MEDICAL CENTER | Age: 83
End: 2018-10-09
Payer: COMMERCIAL

## 2018-10-09 DIAGNOSIS — G89.29 CHRONIC BILATERAL LOW BACK PAIN WITHOUT SCIATICA: Primary | ICD-10-CM

## 2018-10-09 DIAGNOSIS — M54.50 CHRONIC BILATERAL LOW BACK PAIN WITHOUT SCIATICA: Primary | ICD-10-CM

## 2018-10-09 PROCEDURE — 97012 MECHANICAL TRACTION THERAPY: CPT | Performed by: PHYSICAL THERAPIST

## 2018-10-09 NOTE — PROGRESS NOTES
Daily Note     Today's date: 10/9/2018  Patient name: Naga Bryant  : 1934  MRN: [id number]  Referring provider: BETZAIDA Mcintosh  Dx:   Encounter Diagnosis   Name Primary?  Chronic bilateral low back pain without sciatica Yes                  Subjective: Patient states that she is doing well today and notes that her activities are improved  Patient is concerned with her lower back holding up as she has to care for her  more and more  Objective: See treatment diary below  Precautions none    Specialty Daily Treatment Diary     Modalities 10/9       Supine mechanical traction (30:5s; 15 min; 3 step ramp) 75:10#                                   Assessment: Tolerated treatment well  Patient demonstrated fatigue post treatment, exhibited good technique with therapeutic exercises and would benefit from continued PT      Plan: Continue per plan of care

## 2018-10-30 ENCOUNTER — OFFICE VISIT (OUTPATIENT)
Dept: PHYSICAL THERAPY | Facility: MEDICAL CENTER | Age: 83
End: 2018-10-30
Payer: COMMERCIAL

## 2018-10-30 DIAGNOSIS — M54.50 CHRONIC BILATERAL LOW BACK PAIN WITHOUT SCIATICA: Primary | ICD-10-CM

## 2018-10-30 DIAGNOSIS — G89.29 CHRONIC BILATERAL LOW BACK PAIN WITHOUT SCIATICA: Primary | ICD-10-CM

## 2018-10-30 PROCEDURE — 97012 MECHANICAL TRACTION THERAPY: CPT | Performed by: PHYSICAL THERAPIST

## 2018-10-30 NOTE — PROGRESS NOTES
Daily Note     Today's date: 10/30/2018  Patient name: Sugar Mcgill  : 1934  MRN: [id number]  Referring provider: BETZAIDA Cary  Dx:   Encounter Diagnosis   Name Primary?  Chronic bilateral low back pain without sciatica Yes                  Subjective: Patient states that she is doing well today and notes that her activities are improved  Patient is concerned with her lower back holding up as she has to care for her  more and more  Patient notes that her  has been requiring more and more care lately and it is very difficult  Objective: See treatment diary below  Precautions none    Specialty Daily Treatment Diary     Modalities 10/30       Supine mechanical traction (30:5s; 15 min; 3 step ramp) 75:10#                                   Assessment: Tolerated treatment well  Patient demonstrated fatigue post treatment, exhibited good technique with therapeutic exercises and would benefit from continued PT      Plan: Continue per plan of care

## 2018-11-20 ENCOUNTER — OFFICE VISIT (OUTPATIENT)
Dept: PHYSICAL THERAPY | Facility: MEDICAL CENTER | Age: 83
End: 2018-11-20
Payer: COMMERCIAL

## 2018-11-20 DIAGNOSIS — M54.50 CHRONIC BILATERAL LOW BACK PAIN WITHOUT SCIATICA: Primary | ICD-10-CM

## 2018-11-20 DIAGNOSIS — G89.29 CHRONIC BILATERAL LOW BACK PAIN WITHOUT SCIATICA: Primary | ICD-10-CM

## 2018-11-20 PROCEDURE — 97012 MECHANICAL TRACTION THERAPY: CPT | Performed by: PHYSICAL THERAPIST

## 2018-11-20 NOTE — PROGRESS NOTES
Re-assessment and daily note    Today's date: 2018  Patient name: Henrique Yu  : 1934  MRN: [id number]  Referring provider: BETZAIDA Kim  Dx:   Encounter Diagnosis   Name Primary?  Chronic bilateral low back pain without sciatica Yes              Subjective: Patient states that she is doing well today and notes that her activities are improved  Patient is concerned with her lower back holding up as she has to care for her  more and more  Patient notes that her  has been requiring more and more care lately and it is very difficult  Objective: See treatment diary below  Precautions none    Specialty Daily Treatment Diary     Modalities        Supine mechanical traction (30:5s; 15 min; 3 step ramp) 75:10#                                   Assessment: Tolerated treatment well  Patient demonstrated fatigue post treatment, exhibited good technique with therapeutic exercises and would benefit from continued PT  Patient continues to do well with traction which she believes is helping to maintain her gains  Patient should continue with having 1 session per 3-4 weeks for the next 12 months  Patient is paying out of pocket for mechanical traction only  Goal: Patient will maintain gains as desired  Plan: Continue per plan of care

## 2018-11-20 NOTE — LETTER
2018    BETZAIDA Moore    Patient: Oneil Turner   YOB: 1934   Date of Visit: 2018     Encounter Diagnosis     ICD-10-CM    1  Chronic bilateral low back pain without sciatica M54 5     G89 29        Dear Dr Juanis Marquez:    Please review the attached Plan of Care from [facility] [place]  Please verify that you agree therapy should continue by signing the attached document and sending it back to our office  If you have any questions or concerns, please don't hesitate to call  Sincerely,    Tonny Roque PT      Referring Provider:      I certify that I have read the below Plan of Care and certify the need for these services furnished under this plan of treatment while under my care  Misty Moore 7 In Basket          Re-assessment and daily note    Today's date: 2018  Patient name: Oneil Turner  : 1934  MRN: [id number]  Referring provider: BETZAIDA Key  Dx:   Encounter Diagnosis   Name Primary?  Chronic bilateral low back pain without sciatica Yes              Subjective: Patient states that she is doing well today and notes that her activities are improved  Patient is concerned with her lower back holding up as she has to care for her  more and more  Patient notes that her  has been requiring more and more care lately and it is very difficult  Objective: See treatment diary below  Precautions none    Specialty Daily Treatment Diary     Modalities 10/30       Supine mechanical traction (30:5s; 15 min; 3 step ramp) 75:10#                                   Assessment: Tolerated treatment well  Patient demonstrated fatigue post treatment, exhibited good technique with therapeutic exercises and would benefit from continued PT  Patient continues to do well with traction which she believes is helping to maintain her gains    Patient should continue with having 1 session per 3-4 weeks for the next 12 months  Patient is paying out of pocket for mechanical traction only  Goal: Patient will maintain gains as desired  Plan: Continue per plan of care

## 2018-11-20 NOTE — LETTER
2018    Josie Fan MD  [address]  [address] [place]   [address]  [phone]    Patient: Dany Santacruz   YOB: 1934   Date of Visit: 2018     Encounter Diagnosis     ICD-10-CM    1  Chronic bilateral low back pain without sciatica M54 5     G89 29        Dear Dr Regan Murray:    Please review the attached Plan of Care from [facility] recent visit  Please verify that you agree therapy should continue by signing the attached document and sending it back to our office  If you have any questions or concerns, please don't hesitate to call  Sincerely,    Gerry Stevenson PT      Referring Provider:      I certify that I have read the below Plan of Care and certify the need for these services furnished under this plan of treatment while under my care  Josie Fan MD  [address]  [address] [facility] [address] [id number]  VIA Facsimile: [phone]          Re-assessment and daily note    Today's date: 2018  Patient name: Dany Santacruz  : 1934  MRN: [id number]  Referring provider: BETZAIDA Wolf  Dx:   Encounter Diagnosis   Name Primary?  Chronic bilateral low back pain without sciatica Yes              Subjective: Patient states that she is doing well today and notes that her activities are improved  Patient is concerned with her lower back holding up as she has to care for her  more and more  Patient notes that her  has been requiring more and more care lately and it is very difficult  Objective: See treatment diary below  Precautions none    Specialty Daily Treatment Diary     Modalities 10/30       Supine mechanical traction (30:5s; 15 min; 3 step ramp) 75:10#                                   Assessment: Tolerated treatment well  Patient demonstrated fatigue post treatment, exhibited good technique with therapeutic exercises and would benefit from continued PT    Patient continues to do well with traction which she believes is helping to maintain her gains  Patient should continue with having 1 session per 3-4 weeks for the next 12 months  Patient is paying out of pocket for mechanical traction only  Goal: Patient will maintain gains as desired  Plan: Continue per plan of care

## 2018-12-11 ENCOUNTER — OFFICE VISIT (OUTPATIENT)
Dept: PHYSICAL THERAPY | Facility: MEDICAL CENTER | Age: 83
End: 2018-12-11
Payer: COMMERCIAL

## 2018-12-11 DIAGNOSIS — M54.50 CHRONIC BILATERAL LOW BACK PAIN WITHOUT SCIATICA: Primary | ICD-10-CM

## 2018-12-11 DIAGNOSIS — G89.29 CHRONIC BILATERAL LOW BACK PAIN WITHOUT SCIATICA: Primary | ICD-10-CM

## 2018-12-11 PROCEDURE — 97012 MECHANICAL TRACTION THERAPY: CPT | Performed by: PHYSICAL THERAPIST

## 2018-12-11 NOTE — PROGRESS NOTES
Re-assessment and daily note    Today's date: 2018  Patient name: José Luis Vidal  : 1934  MRN: [id number]  Referring provider: BETZAIDA Owen  Dx:   Encounter Diagnosis   Name Primary?  Chronic bilateral low back pain without sciatica Yes              Subjective: Patient states that she is doing well today and notes that her activities are improved  Patient is concerned with her lower back holding up as she has to care for her  more and more  Patient notes that her  has been requiring more and more care lately and it is very difficult  Objective: See treatment diary below  Precautions none    Specialty Daily Treatment Diary     Modalities        Supine mechanical traction (30:5s; 15 min; 3 step ramp) 75:10#                                   Assessment: Tolerated treatment well  Patient demonstrated fatigue post treatment, exhibited good technique with therapeutic exercises and would benefit from continued PT  Patient continues to do well with traction which she believes is helping to maintain her gains  Patient should continue with having 1 session per 3-4 weeks for the next 12 months  Patient is paying out of pocket for mechanical traction only  Goal: Patient will maintain gains as desired  Plan: Continue per plan of care

## 2019-01-08 ENCOUNTER — APPOINTMENT (OUTPATIENT)
Dept: PHYSICAL THERAPY | Facility: MEDICAL CENTER | Age: 84
End: 2019-01-08
Payer: COMMERCIAL

## 2019-01-15 ENCOUNTER — OFFICE VISIT (OUTPATIENT)
Dept: PHYSICAL THERAPY | Facility: MEDICAL CENTER | Age: 84
End: 2019-01-15
Payer: COMMERCIAL

## 2019-01-15 DIAGNOSIS — M54.50 CHRONIC BILATERAL LOW BACK PAIN WITHOUT SCIATICA: Primary | ICD-10-CM

## 2019-01-15 DIAGNOSIS — G89.29 CHRONIC BILATERAL LOW BACK PAIN WITHOUT SCIATICA: Primary | ICD-10-CM

## 2019-01-15 PROCEDURE — 97012 MECHANICAL TRACTION THERAPY: CPT | Performed by: PHYSICAL THERAPIST

## 2019-01-15 NOTE — PROGRESS NOTES
Daily Note     Today's date: 1/15/2019  Patient name: Naveen Brochure  : 1934  MRN: [id number]  Referring provider: BETZAIDA Lawrence  Dx:   Encounter Diagnosis   Name Primary?  Chronic bilateral low back pain without sciatica Yes                  Subjective: Patient states that she is doing well today and notes that her activities are improved  Patient is concerned with her lower back holding up as she has to care for her  more and more  Patient notes that her  has been requiring more and more care lately and it is very difficult  Objective: See treatment diary below  Precautions none    Specialty Daily Treatment Diary     Modalities 1/15       Supine mechanical traction (30:5s; 15 min; 3 step ramp) 75:10#                                   Assessment: Tolerated treatment well  Patient demonstrated fatigue post treatment, exhibited good technique with therapeutic exercises and would benefit from continued PT      Plan: Continue per plan of care

## 2019-01-29 ENCOUNTER — APPOINTMENT (OUTPATIENT)
Dept: PHYSICAL THERAPY | Facility: MEDICAL CENTER | Age: 84
End: 2019-01-29
Payer: COMMERCIAL

## 2019-01-30 ENCOUNTER — APPOINTMENT (OUTPATIENT)
Dept: PHYSICAL THERAPY | Facility: MEDICAL CENTER | Age: 84
End: 2019-01-30
Payer: COMMERCIAL

## 2019-02-15 ENCOUNTER — OFFICE VISIT (OUTPATIENT)
Dept: PHYSICAL THERAPY | Facility: MEDICAL CENTER | Age: 84
End: 2019-02-15
Payer: COMMERCIAL

## 2019-02-15 DIAGNOSIS — G89.29 CHRONIC BILATERAL LOW BACK PAIN WITHOUT SCIATICA: Primary | ICD-10-CM

## 2019-02-15 DIAGNOSIS — M54.50 CHRONIC BILATERAL LOW BACK PAIN WITHOUT SCIATICA: Primary | ICD-10-CM

## 2019-02-15 PROCEDURE — 97012 MECHANICAL TRACTION THERAPY: CPT | Performed by: PHYSICAL THERAPIST

## 2019-02-15 NOTE — PROGRESS NOTES
Daily Note     Today's date: 2/15/2019  Patient name: Zhen Odell  : 1934  MRN: [id number]  Referring provider: BETZAIDA Petty  Dx:   Encounter Diagnosis   Name Primary?  Chronic bilateral low back pain without sciatica Yes                  Subjective: Patient states that she is doing well today and notes that her activities are improved  Patient states that she has been having a bit of an increase in her leg pain since she has been moving her  more  He will be having his hip surgery on Monday next week  Objective: See treatment diary below  Precautions none    Specialty Daily Treatment Diary     Modalities 2/15       Supine mechanical traction (30:5s; 15 min; 3 step ramp) 75:10#                                   Assessment: Tolerated treatment well  Patient demonstrated fatigue post treatment, exhibited good technique with therapeutic exercises and would benefit from continued PT      Plan: Continue per plan of care

## 2019-03-18 ENCOUNTER — APPOINTMENT (OUTPATIENT)
Dept: PHYSICAL THERAPY | Facility: MEDICAL CENTER | Age: 84
End: 2019-03-18
Payer: COMMERCIAL

## 2019-03-22 ENCOUNTER — APPOINTMENT (OUTPATIENT)
Dept: PHYSICAL THERAPY | Facility: MEDICAL CENTER | Age: 84
End: 2019-03-22
Payer: COMMERCIAL

## 2019-03-29 ENCOUNTER — OFFICE VISIT (OUTPATIENT)
Dept: PHYSICAL THERAPY | Facility: MEDICAL CENTER | Age: 84
End: 2019-03-29
Payer: COMMERCIAL

## 2019-03-29 DIAGNOSIS — M54.50 CHRONIC BILATERAL LOW BACK PAIN WITHOUT SCIATICA: Primary | ICD-10-CM

## 2019-03-29 DIAGNOSIS — G89.29 CHRONIC BILATERAL LOW BACK PAIN WITHOUT SCIATICA: Primary | ICD-10-CM

## 2019-03-29 PROCEDURE — 97012 MECHANICAL TRACTION THERAPY: CPT | Performed by: PHYSICAL THERAPIST

## 2019-03-29 NOTE — PROGRESS NOTES
Daily Note     Today's date: 3/29/2019  Patient name: José Luis Vidal  : 1934  MRN: [id number]  Referring provider: BETZAIDA Owen  Dx:   Encounter Diagnosis   Name Primary?  Chronic bilateral low back pain without sciatica Yes                  Subjective: Patient states that she is doing well today and notes that her activities are improved  Patient states that she has been having a bit of an increase in her leg pain since she has been moving her  more  He will be having his hip surgery on Monday next week  Objective: See treatment diary below  Precautions none    Specialty Daily Treatment Diary     Modalities 3/29       Supine mechanical traction (30:5s; 15 min; 3 step ramp) 75:10#                                   Assessment: Tolerated treatment well  Patient demonstrated fatigue post treatment, exhibited good technique with therapeutic exercises and would benefit from continued PT      Plan: Continue per plan of care

## 2019-04-12 ENCOUNTER — EVALUATION (OUTPATIENT)
Dept: PHYSICAL THERAPY | Facility: REHABILITATION | Age: 84
End: 2019-04-12
Payer: COMMERCIAL

## 2019-04-12 DIAGNOSIS — R42 VERTIGO: Primary | ICD-10-CM

## 2019-04-12 PROCEDURE — 97161 PT EVAL LOW COMPLEX 20 MIN: CPT

## 2019-04-12 PROCEDURE — 97140 MANUAL THERAPY 1/> REGIONS: CPT

## 2019-04-18 ENCOUNTER — OFFICE VISIT (OUTPATIENT)
Dept: PHYSICAL THERAPY | Facility: MEDICAL CENTER | Age: 84
End: 2019-04-18
Payer: COMMERCIAL

## 2019-04-18 DIAGNOSIS — M54.50 CHRONIC BILATERAL LOW BACK PAIN WITHOUT SCIATICA: Primary | ICD-10-CM

## 2019-04-18 DIAGNOSIS — G89.29 CHRONIC BILATERAL LOW BACK PAIN WITHOUT SCIATICA: Primary | ICD-10-CM

## 2019-04-18 PROCEDURE — 97012 MECHANICAL TRACTION THERAPY: CPT | Performed by: PHYSICAL THERAPIST

## 2019-05-07 ENCOUNTER — OFFICE VISIT (OUTPATIENT)
Dept: PHYSICAL THERAPY | Facility: MEDICAL CENTER | Age: 84
End: 2019-05-07
Payer: COMMERCIAL

## 2019-05-07 DIAGNOSIS — M54.50 CHRONIC BILATERAL LOW BACK PAIN WITHOUT SCIATICA: Primary | ICD-10-CM

## 2019-05-07 DIAGNOSIS — G89.29 CHRONIC BILATERAL LOW BACK PAIN WITHOUT SCIATICA: Primary | ICD-10-CM

## 2019-05-07 PROCEDURE — 97012 MECHANICAL TRACTION THERAPY: CPT | Performed by: PHYSICAL THERAPIST

## 2019-05-29 ENCOUNTER — APPOINTMENT (OUTPATIENT)
Dept: PHYSICAL THERAPY | Facility: MEDICAL CENTER | Age: 84
End: 2019-05-29
Payer: COMMERCIAL

## 2019-07-24 ENCOUNTER — OFFICE VISIT (OUTPATIENT)
Dept: PHYSICAL THERAPY | Facility: MEDICAL CENTER | Age: 84
End: 2019-07-24
Payer: COMMERCIAL

## 2019-07-24 DIAGNOSIS — G89.29 CHRONIC BILATERAL LOW BACK PAIN WITHOUT SCIATICA: Primary | ICD-10-CM

## 2019-07-24 DIAGNOSIS — M54.50 CHRONIC BILATERAL LOW BACK PAIN WITHOUT SCIATICA: Primary | ICD-10-CM

## 2019-07-24 PROCEDURE — 97012 MECHANICAL TRACTION THERAPY: CPT | Performed by: PHYSICAL THERAPIST

## 2019-07-24 NOTE — PROGRESS NOTES
Daily Note     Today's date: 2019  Patient name: Kary Regalado  : 1934  MRN: [id number]  Referring provider: BETZAIDA Bales  Dx:   Encounter Diagnosis   Name Primary?  Chronic bilateral low back pain without sciatica Yes                  Subjective: Patient states that she is doing well today and notes that her activities are improved  Doing much better since she doesn't have to take care of her  as much    Objective: See treatment diary below  Precautions none    Specialty Daily Treatment Diary     Modalities 2019         Supine mechanical traction (30:5s; 15 min; 3 step ramp) 75:10#                                   Assessment: Tolerated treatment well  Patient demonstrated fatigue post treatment, exhibited good technique with therapeutic exercises and would benefit from continued PT      Plan: Continue per plan of care

## 2019-10-09 ENCOUNTER — OFFICE VISIT (OUTPATIENT)
Dept: PHYSICAL THERAPY | Facility: MEDICAL CENTER | Age: 84
End: 2019-10-09
Payer: COMMERCIAL

## 2019-10-09 DIAGNOSIS — M54.16 LUMBAR RADICULOPATHY: Primary | ICD-10-CM

## 2019-10-09 PROCEDURE — 97012 MECHANICAL TRACTION THERAPY: CPT | Performed by: PHYSICAL THERAPIST

## 2019-10-09 NOTE — PROGRESS NOTES
Daily Note     Today's date: 10/9/2019  Patient name: Leroy Perez  : 1934  MRN: [id number]  Referring provider: BETZAIDA Mensah  Dx:   Encounter Diagnosis   Name Primary?  Lumbar radiculopathy Yes                  Subjective: Patient states that she is doing well today and notes that her activities are improved  Doing much better since she doesn't have to take care of her  as much    Objective: See treatment diary below  Precautions none    Specialty Daily Treatment Diary     Modalities 10/9/2019         Supine mechanical traction (30:5s; 15 min; 3 step ramp) 75:10#                                   Assessment: Tolerated treatment well  Patient demonstrated fatigue post treatment, exhibited good technique with therapeutic exercises and would benefit from continued PT      Plan: Continue per plan of care

## 2019-10-30 ENCOUNTER — OFFICE VISIT (OUTPATIENT)
Dept: PHYSICAL THERAPY | Facility: MEDICAL CENTER | Age: 84
End: 2019-10-30
Payer: COMMERCIAL

## 2019-10-30 DIAGNOSIS — M54.16 LUMBAR RADICULOPATHY: Primary | ICD-10-CM

## 2019-10-30 PROCEDURE — 97012 MECHANICAL TRACTION THERAPY: CPT | Performed by: PHYSICAL THERAPIST

## 2019-10-30 NOTE — PROGRESS NOTES
Daily Note     Today's date: 10/30/2019  Patient name: Jah Tompkins  : 1934  MRN: [id number]  Referring provider: BETZAIDA Serrano  Dx:   Encounter Diagnosis   Name Primary?  Lumbar radiculopathy Yes                  Subjective: Patient states that she is doing well today and notes that her activities are improved  Objective: See treatment diary below  Precautions none    Specialty Daily Treatment Diary     Modalities 10/30/2019         Supine mechanical traction (30:5s; 15 min; 3 step ramp) 75:10#                                   Assessment: Tolerated treatment well  Patient demonstrated fatigue post treatment, exhibited good technique with therapeutic exercises and would benefit from continued PT      Plan: Continue per plan of care

## 2019-11-19 ENCOUNTER — APPOINTMENT (OUTPATIENT)
Dept: PHYSICAL THERAPY | Facility: MEDICAL CENTER | Age: 84
End: 2019-11-19
Payer: COMMERCIAL

## 2019-11-20 ENCOUNTER — APPOINTMENT (OUTPATIENT)
Dept: PHYSICAL THERAPY | Facility: MEDICAL CENTER | Age: 84
End: 2019-11-20
Payer: COMMERCIAL

## 2019-11-21 ENCOUNTER — OFFICE VISIT (OUTPATIENT)
Dept: PHYSICAL THERAPY | Facility: MEDICAL CENTER | Age: 84
End: 2019-11-21
Payer: COMMERCIAL

## 2019-11-21 DIAGNOSIS — M54.16 LUMBAR RADICULOPATHY: Primary | ICD-10-CM

## 2019-11-21 PROCEDURE — 97012 MECHANICAL TRACTION THERAPY: CPT | Performed by: PHYSICAL THERAPIST

## 2019-11-21 NOTE — PROGRESS NOTES
Daily Note     Today's date: 2019  Patient name: Oliver Helm  : 1934  MRN: [id number]  Referring provider: BETZAIDA Buckley  Dx:   Encounter Diagnosis   Name Primary?  Lumbar radiculopathy Yes                  Subjective: Patient states that she is doing well today and notes that her activities are improved  Objective: See treatment diary below  Precautions none    Specialty Daily Treatment Diary     Modalities 2019         Supine mechanical traction (30:5s; 15 min; 3 step ramp) 75:10#                                   Assessment: Tolerated treatment well  Patient demonstrated fatigue post treatment, exhibited good technique with therapeutic exercises and would benefit from continued PT      Plan: Continue per plan of care

## 2019-12-17 ENCOUNTER — APPOINTMENT (OUTPATIENT)
Dept: PHYSICAL THERAPY | Facility: MEDICAL CENTER | Age: 84
End: 2019-12-17
Payer: COMMERCIAL

## 2019-12-30 ENCOUNTER — OFFICE VISIT (OUTPATIENT)
Dept: PHYSICAL THERAPY | Facility: MEDICAL CENTER | Age: 84
End: 2019-12-30
Payer: COMMERCIAL

## 2019-12-30 DIAGNOSIS — M54.16 LUMBAR RADICULOPATHY: Primary | ICD-10-CM

## 2019-12-30 PROCEDURE — 97012 MECHANICAL TRACTION THERAPY: CPT | Performed by: PHYSICAL THERAPIST

## 2019-12-30 NOTE — PROGRESS NOTES
Re-assessment and daily note    Today's date: 2019  Patient name: Oliver Helm  : 1934  MRN: [id number]  Referring provider: BETZAIDA Buckley  Dx:   Encounter Diagnosis   Name Primary?  Lumbar radiculopathy Yes              Subjective: Patient states that she is doing well today and notes that her activities are improved  Patient feels as though having traction keeps her mobile and she doesn't mind paying out of pocket for the service  She is happy with her progression  Objective: See treatment diary below  Precautions none    Specialty Daily Treatment Diary     Modalities        Supine mechanical traction (30:5s; 15 min; 3 step ramp) 75:10#                                   Assessment: Tolerated treatment well  Patient demonstrated fatigue post treatment, exhibited good technique with therapeutic exercises and would benefit from continued PT  Patient continues to do well with traction which she believes is helping to maintain her gains  Patient should continue with having 1 session per 3-4 weeks for the next 12 months  Patient is paying out of pocket for mechanical traction only  Goal: Patient will maintain gains as desired  Plan: Continue per plan of care

## 2020-01-13 ENCOUNTER — OFFICE VISIT (OUTPATIENT)
Dept: PHYSICAL THERAPY | Facility: MEDICAL CENTER | Age: 85
End: 2020-01-13
Payer: COMMERCIAL

## 2020-01-13 DIAGNOSIS — M54.16 LUMBAR RADICULOPATHY: Primary | ICD-10-CM

## 2020-01-13 PROCEDURE — 97012 MECHANICAL TRACTION THERAPY: CPT | Performed by: PHYSICAL THERAPIST

## 2020-01-13 NOTE — PROGRESS NOTES
Daily Note     Today's date: 2020  Patient name: Yazan Godwin  : 1934  MRN: [id number]  Referring provider: BETZAIDA Lamar  Dx:   Encounter Diagnosis   Name Primary?  Lumbar radiculopathy Yes                  Subjective: Patient states that she is doing well today and notes that her activities are improved  Objective: See treatment diary below  Precautions none    Specialty Daily Treatment Diary     Modalities 2020         Supine mechanical traction (30:5s; 15 min; 3 step ramp) 75:10#                                   Assessment: Tolerated treatment well  Patient would benefit from continued PT  Plan: Continue per plan of care

## 2020-02-04 ENCOUNTER — OFFICE VISIT (OUTPATIENT)
Dept: PHYSICAL THERAPY | Facility: MEDICAL CENTER | Age: 85
End: 2020-02-04
Payer: COMMERCIAL

## 2020-02-04 DIAGNOSIS — M54.16 LUMBAR RADICULOPATHY: Primary | ICD-10-CM

## 2020-02-04 PROCEDURE — 97012 MECHANICAL TRACTION THERAPY: CPT | Performed by: PHYSICAL THERAPIST

## 2020-02-04 NOTE — PROGRESS NOTES
Daily Note     Today's date: 2020  Patient name: Aixa Chandra  : 1934  MRN: [id number]  Referring provider: BETZAIDA Berg  Dx:   Encounter Diagnosis   Name Primary?  Lumbar radiculopathy Yes                  Subjective: Patient states that she is doing well today and notes that her activities are improved  Objective: See treatment diary below  Precautions none    Specialty Daily Treatment Diary     Modalities 2020         Supine mechanical traction (30:5s; 15 min; 3 step ramp) 75:10#                                   Assessment: Tolerated treatment well  Patient would benefit from continued PT  Plan: Continue per plan of care

## 2020-02-25 ENCOUNTER — OFFICE VISIT (OUTPATIENT)
Dept: PHYSICAL THERAPY | Facility: MEDICAL CENTER | Age: 85
End: 2020-02-25
Payer: COMMERCIAL

## 2020-02-25 DIAGNOSIS — M54.16 LUMBAR RADICULOPATHY: Primary | ICD-10-CM

## 2020-02-25 PROCEDURE — 97012 MECHANICAL TRACTION THERAPY: CPT

## 2020-02-25 NOTE — PROGRESS NOTES
Daily Note     Today's date: 2020  Patient name: Jerry Cisneros  : 1934  MRN: [id number]  Referring provider: BETZAIDA Burt  Dx:   Encounter Diagnosis     ICD-10-CM    1  Lumbar radiculopathy M54 16                   Subjective: Pt offers no new comments or complaints since LV  Objective: See treatment diary below      Assessment: Tolerated treatment well  Patient is responding well to mechanical traction  Plan: Continue per plan of care          Specialty Daily Treatment Diary      Modalities 2020             Supine mechanical traction (30:5s; 15 min; 3 step ramp) 75:10#

## 2020-07-25 ENCOUNTER — TELEPHONE ENCOUNTER (OUTPATIENT)
Dept: URBAN - METROPOLITAN AREA CLINIC 13 | Facility: CLINIC | Age: 85
End: 2020-07-25

## 2020-07-26 ENCOUNTER — TELEPHONE ENCOUNTER (OUTPATIENT)
Dept: URBAN - METROPOLITAN AREA CLINIC 13 | Facility: CLINIC | Age: 85
End: 2020-07-26

## 2021-07-14 ENCOUNTER — APPOINTMENT (OUTPATIENT)
Dept: RADIOLOGY | Facility: MEDICAL CENTER | Age: 86
End: 2021-07-14
Payer: COMMERCIAL

## 2021-07-14 DIAGNOSIS — R07.81 PLEURITIC CHEST PAIN: ICD-10-CM

## 2021-07-14 DIAGNOSIS — M54.9 DORSALGIA: ICD-10-CM

## 2021-07-14 PROCEDURE — 72070 X-RAY EXAM THORAC SPINE 2VWS: CPT

## 2021-07-14 PROCEDURE — 71101 X-RAY EXAM UNILAT RIBS/CHEST: CPT

## 2021-09-05 ENCOUNTER — OFFICE VISIT (OUTPATIENT)
Dept: URGENT CARE | Facility: MEDICAL CENTER | Age: 86
End: 2021-09-05
Payer: COMMERCIAL

## 2021-09-05 ENCOUNTER — APPOINTMENT (OUTPATIENT)
Dept: RADIOLOGY | Facility: MEDICAL CENTER | Age: 86
End: 2021-09-05
Payer: COMMERCIAL

## 2021-09-05 VITALS
HEART RATE: 92 BPM | BODY MASS INDEX: 23.74 KG/M2 | WEIGHT: 129 LBS | HEIGHT: 62 IN | RESPIRATION RATE: 20 BRPM | TEMPERATURE: 99.5 F | OXYGEN SATURATION: 94 %

## 2021-09-05 DIAGNOSIS — J02.9 SORE THROAT: Primary | ICD-10-CM

## 2021-09-05 DIAGNOSIS — J02.9 SORE THROAT: ICD-10-CM

## 2021-09-05 LAB — S PYO AG THROAT QL: NEGATIVE

## 2021-09-05 PROCEDURE — 87070 CULTURE OTHR SPECIMN AEROBIC: CPT | Performed by: PHYSICIAN ASSISTANT

## 2021-09-05 PROCEDURE — 71046 X-RAY EXAM CHEST 2 VIEWS: CPT

## 2021-09-05 PROCEDURE — 99213 OFFICE O/P EST LOW 20 MIN: CPT | Performed by: PHYSICIAN ASSISTANT

## 2021-09-05 PROCEDURE — S9088 SERVICES PROVIDED IN URGENT: HCPCS | Performed by: PHYSICIAN ASSISTANT

## 2021-09-05 PROCEDURE — 87880 STREP A ASSAY W/OPTIC: CPT | Performed by: PHYSICIAN ASSISTANT

## 2021-09-05 RX ORDER — ASPIRIN 81 MG
TABLET, DELAYED RELEASE (ENTERIC COATED) ORAL
COMMUNITY

## 2021-09-05 RX ORDER — ATORVASTATIN CALCIUM 40 MG/1
40 TABLET, FILM COATED ORAL EVERY EVENING
COMMUNITY
Start: 2021-08-12

## 2021-09-05 RX ORDER — DESOXIMETASONE 2.5 MG/G
CREAM TOPICAL
COMMUNITY
Start: 2021-07-14

## 2021-09-05 RX ORDER — GABAPENTIN 100 MG/1
CAPSULE ORAL
COMMUNITY
Start: 2021-09-03

## 2021-09-05 NOTE — PROGRESS NOTES
[facility]        NAME: Trinity Patino is a 80 y o  female  : 1934    MRN: [id number]  DATE: 2021  TIME: 11:43 AM    Assessment and Plan   Sore throat [J02 9]  1  Sore throat  Throat culture    POCT rapid strepA   Patient was offered COVID 19 testing and Chest xray but declined  Eventually patient ok a chest x-ray  Patient denies feeling short of breath  Patient was walked and oxygen remained at 96  Rapid strep- negative will send for culture  Chest X-ray possible pneumonia and enlarged heart  Pending radiology report  Patient was told I recommend she go to ED  Patient declined  Patient Instructions       Patient was educated on sore throat  Patient was educated on OTC Chloraseptic spray  Patient was educated in great detail I recommend she go to ED for further testing due to shortness of breath and oxygen decreasing with walking  Patient was also told she had wheezing in lungs  Chief Complaint     Chief Complaint   Patient presents with    Sore Throat     Patient states she started Friday night with sore throat and pain in her ears  Ift has gotten worse         History of Present Illness       Patient presents today with sore throat since September 3, 2021  Patient reports pain with swallowing  Denies any pain or shortness of breath  Denies any COVID 19 exposures  Patient was vaccinated for COVID 19  Review of Systems   Review of Systems   HENT: Positive for ear pain and sore throat  Respiratory: Negative  Cardiovascular: Negative  Psychiatric/Behavioral: Negative  Current Medications       Current Outpatient Medications:     desoximetasone (TOPICORT) 0 25 % cream, Apply topically to skin as needed  , Disp: , Rfl:     atorvastatin (LIPITOR) 40 mg tablet, Take 40 mg by mouth every evening, Disp: , Rfl:     Docusate Sodium 100 MG capsule, Take by mouth, Disp: , Rfl:     gabapentin (NEURONTIN) 100 mg capsule, , Disp: , Rfl:     Current Allergies     Allergies as of 09/05/2021 - Reviewed 09/05/2021   Allergen Reaction Noted    Codeine Nausea Only and GI Intolerance 05/15/2017    Erythromycin Nausea Only and GI Intolerance 07/21/2015    Amoxicillin Rash 09/20/2016    Azithromycin Rash 09/20/2016    Erythromycin base Rash 10/19/2020    Metoprolol Rash 12/09/2020    Oxycodone Rash 06/27/2017    Oxycodone-acetaminophen Rash 07/07/2017            The following portions of the patient's history were reviewed and updated as appropriate: allergies, current medications, past family history, past medical history, past social history, past surgical history and problem list      Past Medical History:   Diagnosis Date    Hypercholesteremia     Stroke [facility])        Past Surgical History:   Procedure Laterality Date    ADENOIDECTOMY      APPENDECTOMY      HYSTERECTOMY      TONSILLECTOMY         History reviewed  No pertinent family history  Medications have been verified  Objective   Pulse 92   Temp 99 5 °F (37 5 °C)   Resp 20   Ht 5' 2" (1 575 m)   Wt 58 5 kg (129 lb)   SpO2 94%   BMI 23 59 kg/m²   No LMP recorded  Patient is postmenopausal        Physical Exam     Physical Exam  Constitutional:       Appearance: She is normal weight  HENT:      Head: Normocephalic  Comments: No pain over frontal or maxillary sinus     Right Ear: Ear canal and external ear normal       Left Ear: Ear canal and external ear normal       Ears:      Comments: TM hard to see due to ear cerumen  Nose: Nose normal       Mouth/Throat:      Mouth: Mucous membranes are moist       Pharynx: Posterior oropharyngeal erythema present  No oropharyngeal exudate  Eyes:      Pupils: Pupils are equal, round, and reactive to light  Neck:      Comments: No palpable lymph nodes  Cardiovascular:      Rate and Rhythm: Normal rate and regular rhythm  Heart sounds: Normal heart sounds  Pulmonary:      Breath sounds: Wheezing present     Neurological: General: No focal deficit present  Mental Status: She is alert and oriented to person, place, and time     Psychiatric:         Mood and Affect: Mood normal          Behavior: Behavior normal

## 2021-09-05 NOTE — PATIENT INSTRUCTIONS
Patient was educated on sore throat  Patient was educated on OTC Chloraseptic spray  Patient was educated in great detail I recommend she go to ED for further testing due to shortness of breath and oxygen decreasing with walking  Patient was also told she had wheezing in lungs  Sore Throat, Ambulatory Care   GENERAL INFORMATION:   A sore throat  is often caused by a cold or flu virus  A sore throat may also be caused by bacteria such as strep  Other causes include smoking, a runny nose, allergies, or acid reflux  Seek immediate care for the following symptoms:   · Trouble breathing or swallowing because your throat is swollen or sore    · Drooling because it hurts too much to swallow    · A painful lump in your throat that does not go away after 5 days    · A fever higher than 102? F (39?C) or lasts longer than 3 days    · Confusion    · Blood in your throat or ear  Treatment for a sore throat  will depend on the cause how severe it is  A sore throat cause by a virus will go away on its own without treatment  You will need antibiotics if your sore throat is caused by bacteria  Your sore throat should start to feel better within 3 to 5 days for both viral and bacterial infections  Care for your sore throat:   · Gargle with salt water  Mix ¼ teaspoon salt in a glass of warm water and gargle  This may help reduce swelling in your throat  · Take ibuprofen or acetaminophen:  These medicines decrease pain and fever  They are available without a doctor's order  Ask your healthcare provider which medicine is best for you  Ask how much to take and how often to take it  · Drink more liquids  Cold or warm drinks may help soothe your sore throat  Drinking liquids can also help prevent dehydration  · Use a cool-steam humidifier  to help moisten the air in your room and reduce your throat pain  · Use lozenges, ice, soft foods, or popsicles  to soothe your throat      · Rest your throat as much as possible  Try not to use your voice  This may irritate your throat and worsen your symptoms  Follow up with your healthcare provider as directed:  Write down your questions so you remember to ask them during your visits  CARE AGREEMENT:   You have the right to help plan your care  Learn about your health condition and how it may be treated  Discuss treatment options with your caregivers to decide what care you want to receive  You always have the right to refuse treatment  The above information is an  only  It is not intended as medical advice for individual conditions or treatments  Talk to your doctor, nurse or pharmacist before following any medical regimen to see if it is safe and effective for you  © 2014 [address] is for End User's use only and may not be sold, redistributed or otherwise used for commercial purposes  All illustrations and images included in CareNotes® are the copyrighted property of [employer]  or [employer] Coffe[employer]

## 2021-09-07 ENCOUNTER — TELEPHONE (OUTPATIENT)
Dept: URGENT CARE | Facility: MEDICAL CENTER | Age: 86
End: 2021-09-07

## 2021-09-07 LAB — BACTERIA THROAT CULT: NORMAL

## 2022-02-23 ENCOUNTER — APPOINTMENT (OUTPATIENT)
Dept: LAB | Facility: MEDICAL CENTER | Age: 87
End: 2022-02-23
Payer: COMMERCIAL

## 2022-02-23 DIAGNOSIS — D75.839 THROMBOCYTOSIS, UNSPECIFIED: ICD-10-CM

## 2022-02-23 DIAGNOSIS — D64.9 ANEMIA, UNSPECIFIED TYPE: ICD-10-CM

## 2022-02-23 DIAGNOSIS — E53.8 VITAMIN B 12 DEFICIENCY: ICD-10-CM

## 2022-02-23 LAB
BASOPHILS # BLD AUTO: 0.1 THOUSANDS/ΜL (ref 0–0.1)
BASOPHILS NFR BLD AUTO: 1 % (ref 0–1)
EOSINOPHIL # BLD AUTO: 0.61 THOUSAND/ΜL (ref 0–0.61)
EOSINOPHIL NFR BLD AUTO: 8 % (ref 0–6)
ERYTHROCYTE [DISTWIDTH] IN BLOOD BY AUTOMATED COUNT: 15.7 % (ref 11.6–15.1)
FERRITIN SERPL-MCNC: 153 NG/ML (ref 8–388)
FOLATE SERPL-MCNC: >20 NG/ML (ref 3.1–17.5)
HCT VFR BLD AUTO: 31.6 % (ref 34.8–46.1)
HGB BLD-MCNC: 9.7 G/DL (ref 11.5–15.4)
IMM GRANULOCYTES # BLD AUTO: 0.02 THOUSAND/UL (ref 0–0.2)
IMM GRANULOCYTES NFR BLD AUTO: 0 % (ref 0–2)
LYMPHOCYTES # BLD AUTO: 2.02 THOUSANDS/ΜL (ref 0.6–4.47)
LYMPHOCYTES NFR BLD AUTO: 28 % (ref 14–44)
MCH RBC QN AUTO: 29.3 PG (ref 26.8–34.3)
MCHC RBC AUTO-ENTMCNC: 30.7 G/DL (ref 31.4–37.4)
MCV RBC AUTO: 96 FL (ref 82–98)
MONOCYTES # BLD AUTO: 0.68 THOUSAND/ΜL (ref 0.17–1.22)
MONOCYTES NFR BLD AUTO: 9 % (ref 4–12)
NEUTROPHILS # BLD AUTO: 3.86 THOUSANDS/ΜL (ref 1.85–7.62)
NEUTS SEG NFR BLD AUTO: 54 % (ref 43–75)
NRBC BLD AUTO-RTO: 0 /100 WBCS
PLATELET # BLD AUTO: 741 THOUSANDS/UL (ref 149–390)
PMV BLD AUTO: 10.8 FL (ref 8.9–12.7)
RBC # BLD AUTO: 3.31 MILLION/UL (ref 3.81–5.12)
VIT B12 SERPL-MCNC: 564 PG/ML (ref 100–900)
WBC # BLD AUTO: 7.29 THOUSAND/UL (ref 4.31–10.16)

## 2022-02-23 PROCEDURE — 82607 VITAMIN B-12: CPT

## 2022-02-23 PROCEDURE — 85025 COMPLETE CBC W/AUTO DIFF WBC: CPT

## 2022-02-23 PROCEDURE — 82728 ASSAY OF FERRITIN: CPT

## 2022-02-23 PROCEDURE — 82746 ASSAY OF FOLIC ACID SERUM: CPT

## 2022-02-23 PROCEDURE — 36415 COLL VENOUS BLD VENIPUNCTURE: CPT

## 2022-02-25 ENCOUNTER — TELEPHONE (OUTPATIENT)
Dept: CARDIAC SURGERY | Facility: CLINIC | Age: 87
End: 2022-02-25

## 2022-02-25 NOTE — TELEPHONE ENCOUNTER
Pt needs to have an appt scheduled due to vitamin B deficiency, thrombosis and anemia  Pt can be reached at [phone]

## 2022-02-28 ENCOUNTER — TELEPHONE (OUTPATIENT)
Dept: HEMATOLOGY ONCOLOGY | Facility: CLINIC | Age: 87
End: 2022-02-28

## 2022-05-05 ENCOUNTER — APPOINTMENT (OUTPATIENT)
Dept: LAB | Facility: MEDICAL CENTER | Age: 87
End: 2022-05-05
Payer: COMMERCIAL

## 2022-05-05 DIAGNOSIS — D64.9 ANEMIA, UNSPECIFIED TYPE: ICD-10-CM

## 2022-05-05 LAB
BASOPHILS # BLD AUTO: 0.08 THOUSANDS/ΜL (ref 0–0.1)
BASOPHILS NFR BLD AUTO: 1 % (ref 0–1)
EOSINOPHIL # BLD AUTO: 0.35 THOUSAND/ΜL (ref 0–0.61)
EOSINOPHIL NFR BLD AUTO: 5 % (ref 0–6)
ERYTHROCYTE [DISTWIDTH] IN BLOOD BY AUTOMATED COUNT: 15.9 % (ref 11.6–15.1)
HCT VFR BLD AUTO: 30.5 % (ref 34.8–46.1)
HGB BLD-MCNC: 9.7 G/DL (ref 11.5–15.4)
IMM GRANULOCYTES # BLD AUTO: 0.02 THOUSAND/UL (ref 0–0.2)
IMM GRANULOCYTES NFR BLD AUTO: 0 % (ref 0–2)
LYMPHOCYTES # BLD AUTO: 1.78 THOUSANDS/ΜL (ref 0.6–4.47)
LYMPHOCYTES NFR BLD AUTO: 25 % (ref 14–44)
MCH RBC QN AUTO: 30.4 PG (ref 26.8–34.3)
MCHC RBC AUTO-ENTMCNC: 31.8 G/DL (ref 31.4–37.4)
MCV RBC AUTO: 96 FL (ref 82–98)
MONOCYTES # BLD AUTO: 0.93 THOUSAND/ΜL (ref 0.17–1.22)
MONOCYTES NFR BLD AUTO: 13 % (ref 4–12)
NEUTROPHILS # BLD AUTO: 4.04 THOUSANDS/ΜL (ref 1.85–7.62)
NEUTS SEG NFR BLD AUTO: 56 % (ref 43–75)
NRBC BLD AUTO-RTO: 0 /100 WBCS
PLATELET # BLD AUTO: 684 THOUSANDS/UL (ref 149–390)
PMV BLD AUTO: 10.6 FL (ref 8.9–12.7)
RBC # BLD AUTO: 3.19 MILLION/UL (ref 3.81–5.12)
WBC # BLD AUTO: 7.2 THOUSAND/UL (ref 4.31–10.16)

## 2022-05-05 PROCEDURE — 85025 COMPLETE CBC W/AUTO DIFF WBC: CPT

## 2022-05-05 PROCEDURE — 36415 COLL VENOUS BLD VENIPUNCTURE: CPT

## 2022-10-25 ENCOUNTER — APPOINTMENT (OUTPATIENT)
Dept: LAB | Facility: MEDICAL CENTER | Age: 87
End: 2022-10-25
Payer: COMMERCIAL

## 2022-10-25 DIAGNOSIS — I10 ESSENTIAL HYPERTENSION, MALIGNANT: ICD-10-CM

## 2022-10-25 DIAGNOSIS — D64.9 ANEMIA, UNSPECIFIED TYPE: ICD-10-CM

## 2022-10-25 LAB
ALBUMIN SERPL BCP-MCNC: 3.9 G/DL (ref 3.5–5)
ALP SERPL-CCNC: 83 U/L (ref 46–116)
ALT SERPL W P-5'-P-CCNC: 25 U/L (ref 12–78)
ANION GAP SERPL CALCULATED.3IONS-SCNC: 5 MMOL/L (ref 4–13)
AST SERPL W P-5'-P-CCNC: 22 U/L (ref 5–45)
BASOPHILS # BLD AUTO: 0.09 THOUSANDS/ÂΜL (ref 0–0.1)
BASOPHILS NFR BLD AUTO: 1 % (ref 0–1)
BILIRUB SERPL-MCNC: 0.63 MG/DL (ref 0.2–1)
BUN SERPL-MCNC: 32 MG/DL (ref 5–25)
CALCIUM SERPL-MCNC: 9.1 MG/DL (ref 8.3–10.1)
CHLORIDE SERPL-SCNC: 109 MMOL/L (ref 96–108)
CHOLEST SERPL-MCNC: 110 MG/DL
CO2 SERPL-SCNC: 27 MMOL/L (ref 21–32)
CREAT SERPL-MCNC: 1.18 MG/DL (ref 0.6–1.3)
EOSINOPHIL # BLD AUTO: 0.48 THOUSAND/ÂΜL (ref 0–0.61)
EOSINOPHIL NFR BLD AUTO: 6 % (ref 0–6)
ERYTHROCYTE [DISTWIDTH] IN BLOOD BY AUTOMATED COUNT: 16.2 % (ref 11.6–15.1)
FERRITIN SERPL-MCNC: 138 NG/ML (ref 8–388)
GFR SERPL CREATININE-BSD FRML MDRD: 41 ML/MIN/1.73SQ M
GLUCOSE P FAST SERPL-MCNC: 93 MG/DL (ref 65–99)
HCT VFR BLD AUTO: 29.4 % (ref 34.8–46.1)
HDLC SERPL-MCNC: 43 MG/DL
HGB BLD-MCNC: 9.4 G/DL (ref 11.5–15.4)
IMM GRANULOCYTES # BLD AUTO: 0.03 THOUSAND/UL (ref 0–0.2)
IMM GRANULOCYTES NFR BLD AUTO: 0 % (ref 0–2)
LDLC SERPL CALC-MCNC: 43 MG/DL (ref 0–100)
LYMPHOCYTES # BLD AUTO: 1.95 THOUSANDS/ÂΜL (ref 0.6–4.47)
LYMPHOCYTES NFR BLD AUTO: 24 % (ref 14–44)
MCH RBC QN AUTO: 30.5 PG (ref 26.8–34.3)
MCHC RBC AUTO-ENTMCNC: 32 G/DL (ref 31.4–37.4)
MCV RBC AUTO: 96 FL (ref 82–98)
MONOCYTES # BLD AUTO: 0.85 THOUSAND/ÂΜL (ref 0.17–1.22)
MONOCYTES NFR BLD AUTO: 11 % (ref 4–12)
NEUTROPHILS # BLD AUTO: 4.65 THOUSANDS/ÂΜL (ref 1.85–7.62)
NEUTS SEG NFR BLD AUTO: 58 % (ref 43–75)
NONHDLC SERPL-MCNC: 67 MG/DL
NRBC BLD AUTO-RTO: 0 /100 WBCS
PLATELET # BLD AUTO: 735 THOUSANDS/UL (ref 149–390)
PMV BLD AUTO: 10.6 FL (ref 8.9–12.7)
POTASSIUM SERPL-SCNC: 4.2 MMOL/L (ref 3.5–5.3)
PROT SERPL-MCNC: 6.9 G/DL (ref 6.4–8.4)
RBC # BLD AUTO: 3.08 MILLION/UL (ref 3.81–5.12)
SODIUM SERPL-SCNC: 141 MMOL/L (ref 135–147)
TRIGL SERPL-MCNC: 119 MG/DL
WBC # BLD AUTO: 8.05 THOUSAND/UL (ref 4.31–10.16)

## 2022-10-25 PROCEDURE — 36415 COLL VENOUS BLD VENIPUNCTURE: CPT

## 2022-10-25 PROCEDURE — 80061 LIPID PANEL: CPT

## 2022-10-25 PROCEDURE — 85025 COMPLETE CBC W/AUTO DIFF WBC: CPT

## 2022-10-25 PROCEDURE — 80053 COMPREHEN METABOLIC PANEL: CPT

## 2022-10-25 PROCEDURE — 82728 ASSAY OF FERRITIN: CPT

## 2023-07-19 ENCOUNTER — EVALUATION (OUTPATIENT)
Dept: PHYSICAL THERAPY | Facility: MEDICAL CENTER | Age: 88
End: 2023-07-19
Payer: COMMERCIAL

## 2023-07-19 DIAGNOSIS — M54.16 LUMBAR RADICULOPATHY: Primary | ICD-10-CM

## 2023-07-19 PROCEDURE — 97162 PT EVAL MOD COMPLEX 30 MIN: CPT | Performed by: PHYSICAL THERAPIST

## 2023-07-19 PROCEDURE — 97012 MECHANICAL TRACTION THERAPY: CPT | Performed by: PHYSICAL THERAPIST

## 2023-07-19 NOTE — TELEPHONE ENCOUNTER
Additional Information  • Negative: Is this related to a work injury? • Negative: Is this related to an MVA? • Negative: Are you currently recieving homecare services? • Negative: Has the patient had unexplained weight loss? • Affirmative: Is this a chronic condition? • Negative: Does the patient have a fever? • Negative: Is the patient experiencing urine retention? • Negative: Is the patient experiencing acute drop foot or paralysis?     Protocols used: [facility] COMPREHENSIVE SPINE PROGRAM PROTOCOL

## 2023-07-19 NOTE — TELEPHONE ENCOUNTER
Additional Information  • Negative: Is this related to a work injury? • Negative: Is this related to an MVA? • Negative: Are you currently recieving homecare services? • Negative: Has the patient had unexplained weight loss? • Affirmative: Is this a chronic condition? • Negative: Does the patient have a fever? • Negative: Is the patient experiencing urine retention? • Negative: Is the patient experiencing acute drop foot or paralysis? • Negative: Has the patient experienced major trauma? (fall from height, high speed collision, direct blow to spine) and is also experiencing nausea, light-headedness, or loss of consciousness?     Protocols used: [facility] COMPREHENSIVE SPINE PROGRAM PROTOCOL

## 2023-07-19 NOTE — TELEPHONE ENCOUNTER
Additional Information  • Negative: Is this related to a work injury? • Negative: Is this related to an MVA? • Negative: Are you currently recieving homecare services? • Negative: Has the patient had unexplained weight loss?     Protocols used:  [facility] COMPREHENSIVE SPINE PROGRAM PROTOCOL

## 2023-07-19 NOTE — TELEPHONE ENCOUNTER
Additional Information  • Negative: Is this related to a work injury? • Negative: Is this related to an MVA? • Negative: Are you currently recieving homecare services? • Negative: Has the patient had unexplained weight loss? • Affirmative: Is this a chronic condition? • Negative: Does the patient have a fever?     Protocols used: [facility] COMPREHENSIVE SPINE PROGRAM PROTOCOL

## 2023-07-19 NOTE — TELEPHONE ENCOUNTER
Additional Information  • Negative: Is this related to a work injury? • Negative: Is this related to an MVA?     Protocols used: [facility] COMPREHENSIVE SPINE PROGRAM PROTOCOL

## 2023-07-19 NOTE — TELEPHONE ENCOUNTER
Additional Information  • Negative: Is this related to a work injury? • Negative: Is this related to an MVA? • Negative: Are you currently recieving homecare services? • Negative: Has the patient had unexplained weight loss? • Affirmative: Is this a chronic condition?     Protocols used: [facility] COMPREHENSIVE SPINE PROGRAM PROTOCOL

## 2023-07-19 NOTE — TELEPHONE ENCOUNTER
Additional Information  • Negative: Is this related to a work injury?     Protocols used: [facility] COMPREHENSIVE SPINE PROGRAM PROTOCOL

## 2023-07-19 NOTE — TELEPHONE ENCOUNTER
Additional Information  • Negative: Is this related to a work injury? • Negative: Is this related to an MVA? • Negative: Are you currently recieving homecare services? • Negative: Has the patient had unexplained weight loss? • Affirmative: Is this a chronic condition? • Negative: Does the patient have a fever? • Negative: Is the patient experiencing urine retention?     Protocols used: [facility] COMPREHENSIVE SPINE PROGRAM PROTOCOL

## 2023-07-19 NOTE — PROGRESS NOTES
PT Evaluation     Today's date: 2023  Patient name: Donalee Prader  : 1934  MRN: [id number]  Referring provider: Noman Yun MD  Dx:   Encounter Diagnosis     ICD-10-CM    1. Lumbar radiculopathy  M54.16                      Assessment/Plan  Patient is a very pleasant 81 yo female who presents with lower back pain with right sided radiculopathy. 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 Patient is having issues with all functional movement and standing however most problematic is her discomfort with attempted sleeping. Patient is currently taking gabapentin but may also benefit from an anti-inflammatory to reduce this acute flare. Will reach out to PCP to consult. She tolerated initial evaluation well and improved slightly with traction as she has in the past.  Patient should be seen 1-2x a week for 4-6 weeks. Subjective  Patient states that 4 weeks ago she started having pain after cleaning her kitchen. The pain in her back started going into her right leg and she has been in severe discomfort since that time. Patient has most difficulty with sleep and caring for her . She would like to have less pain and return to her normal function and care taking with less pain. Objective  Red Flags: none  Pain: 8/10  Reflexes:     Right Left   Biceps 2+ 2+   Triceps 2+ 2+   Brachioradialis 2+ 2+   Patellar 1+ 1+   Achilles 1+ 1+   Inverted supinator sign neg neg   clonus neg neg   Babinski neg neg        Posture: loss of lumbar lordosis. AROM: limited in forward flexion and extension. Discomfort with rotation however full movement.    PROM: deferred  PAROM: deferred due to pain  Palpation: TTP lumbar spine musculature mostly on the right with limited tolerance to joint testing due to pain   Special Tests: + dural sign right, no UMN signs,   Coordination of Movement/strengthe: Patient demonstrates poor activation of Transversus Abdominus and multifidus force couple and loss of feed forward mechanism with reaching tasks. Patient was able to perform activation with cueing.    Goals:  - Pt I with initial HEP in 1-2 visits  - decrease pain to 3/10  - improved stabilizing structure activation and improved feed forward mechanism with distal activation  - Increase Functional Status Measure measured by FOTO by [facility]  - sleep through the night without waking from pain         Precautions: none

## 2023-07-19 NOTE — TELEPHONE ENCOUNTER
Additional Information  • Negative: Is this related to a work injury? • Negative: Is this related to an MVA? • Negative: Are you currently recieving homecare services? • Negative: Has the patient had unexplained weight loss? • Affirmative: Is this a chronic condition? • Negative: Does the patient have a fever? • Negative: Is the patient experiencing urine retention? • Negative: Is the patient experiencing acute drop foot or paralysis? • Negative: Has the patient experienced major trauma? (fall from height, high speed collision, direct blow to spine) and is also experiencing nausea, light-headedness, or loss of consciousness? • Negative: Is the patient experiencing blood in sputum?     Protocols used: [facility] COMPREHENSIVE SPINE PROGRAM PROTOCOL

## 2023-07-19 NOTE — LETTER
2023    Eugenio Sherman MD  [address] [facility]  [address] [place] [id number]    Patient: Robinson Lacy   YOB: 1934   Date of Visit: 2023     Encounter Diagnosis     ICD-10-CM    1. Lumbar radiculopathy  M54.16           Dear Dr. Maricruz Telles: Thank you for your recent referral of Robinson Lacy. Please review the attached evaluation summary from Nilsa's recent visit. Please verify that you agree with the plan of care by signing the attached order. If you have any questions or concerns, please do not hesitate to call. I sincerely appreciate the opportunity to share in the care of one of your patients and hope to have another opportunity to work with you in the near future. Sincerely,    Ludivina Meyer, PT      Referring Provider:      I certify that I have read the below Plan of Care and certify the need for these services furnished under this plan of treatment while under my care. Eugenio Sherman MD  [address] [address]  Via Fax: [phone]          PT Evaluation     Today's date: 2023  Patient name: Robinson Lacy  : 1934  MRN: [id number]  Referring provider: Eugenio Sherman MD  Dx:   Encounter Diagnosis     ICD-10-CM    1. Lumbar radiculopathy  M54.16                      Assessment/Plan  Patient is a very pleasant 79 yo female who presents with lower back pain with right sided radiculopathy. 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 Patient is having issues with all functional movement and standing however most problematic is her discomfort with attempted sleeping. Patient is currently taking gabapentin but may also benefit from an anti-inflammatory to reduce this acute flare. Will reach out to PCP to consult.   She tolerated initial evaluation well and improved slightly with traction as she has in the past. Patient should be seen 1-2x a week for 4-6 weeks. Subjective  Patient states that 4 weeks ago she started having pain after cleaning her kitchen. The pain in her back started going into her right leg and she has been in severe discomfort since that time. Patient has most difficulty with sleep and caring for her . She would like to have less pain and return to her normal function and care taking with less pain. Objective  Red Flags: none  Pain: 8/10  Reflexes:     Right Left   Biceps 2+ 2+   Triceps 2+ 2+   Brachioradialis 2+ 2+   Patellar 1+ 1+   Achilles 1+ 1+   Inverted supinator sign neg neg   clonus neg neg   Babinski neg neg        Posture: loss of lumbar lordosis. AROM: limited in forward flexion and extension. Discomfort with rotation however full movement. PROM: deferred  PAROM: deferred due to pain  Palpation: TTP lumbar spine musculature mostly on the right with limited tolerance to joint testing due to pain   Special Tests: + dural sign right, no UMN signs,   Coordination of Movement/strengthe: Patient demonstrates poor activation of Transversus Abdominus and multifidus force couple and loss of feed forward mechanism with reaching tasks. Patient was able to perform activation with cueing.    Goals:  - Pt I with initial HEP in 1-2 visits  - decrease pain to 3/10  - improved stabilizing structure activation and improved feed forward mechanism with distal activation  - Increase Functional Status Measure measured by FOTO by MDIC  - sleep through the night without waking from pain        Precautions: none

## 2023-07-21 ENCOUNTER — OFFICE VISIT (OUTPATIENT)
Dept: PHYSICAL THERAPY | Facility: MEDICAL CENTER | Age: 88
End: 2023-07-21
Payer: COMMERCIAL

## 2023-07-21 DIAGNOSIS — M54.16 LUMBAR RADICULOPATHY: Primary | ICD-10-CM

## 2023-07-21 PROCEDURE — 97012 MECHANICAL TRACTION THERAPY: CPT | Performed by: PHYSICAL THERAPIST

## 2023-07-21 PROCEDURE — 97110 THERAPEUTIC EXERCISES: CPT | Performed by: PHYSICAL THERAPIST

## 2023-07-21 PROCEDURE — 97140 MANUAL THERAPY 1/> REGIONS: CPT | Performed by: PHYSICAL THERAPIST

## 2023-07-21 NOTE — PROGRESS NOTES
Daily Note     Today's date: 2023  Patient name: Sid Baer  : 1934  MRN: [id number]  Referring provider: Delano David MD  Dx:   Encounter Diagnosis     ICD-10-CM    1. Lumbar radiculopathy  M54.16                      Subjective: patient states that she continues to have the same discomfort. She is not able to sleep at night because of the pain that she is having in the lower back and the right leg. Patient notes that she contacted the PCP to discuss a possible pharmacological intervention and is waiting to hear back. Objective: See treatment diary below      Assessment: Tolerated treatment fair. Patient exhibited good technique with therapeutic exercises and would benefit from continued PT. Could not change patient's symptoms with stretching, manual therapy, or exercise. Trial of traction today seemed to help some but symptoms returned with standing and walking. Plan: Continue per plan of care.       Precautions: none      Daily Treatment Diary     Manual                                                                                   Exercise Diary              Hip flexor str             Hamstring str             Piriformis str             LTR             SKTC             SLR             Bridging with ball between knees             Clamshell with band             Standing hip abduction             Standing hip extension                                       Bike                                                                                                            Modalities                           Mechanical traction 15 min 60lbs 30/5

## 2023-08-02 ENCOUNTER — APPOINTMENT (OUTPATIENT)
Dept: PHYSICAL THERAPY | Facility: MEDICAL CENTER | Age: 88
End: 2023-08-02
Payer: COMMERCIAL

## 2023-08-04 ENCOUNTER — APPOINTMENT (OUTPATIENT)
Dept: PHYSICAL THERAPY | Facility: MEDICAL CENTER | Age: 88
End: 2023-08-04
Payer: COMMERCIAL

## 2023-08-07 ENCOUNTER — OFFICE VISIT (OUTPATIENT)
Dept: PHYSICAL THERAPY | Facility: MEDICAL CENTER | Age: 88
End: 2023-08-07
Payer: COMMERCIAL

## 2023-08-07 DIAGNOSIS — M54.16 LUMBAR RADICULOPATHY: Primary | ICD-10-CM

## 2023-08-07 PROCEDURE — 97012 MECHANICAL TRACTION THERAPY: CPT | Performed by: PHYSICAL THERAPIST

## 2023-08-07 NOTE — PROGRESS NOTES
Daily Note     Today's date: 2023  Patient name: Elliott Milligan  : 1934  MRN: [id number]  Referring provider: Alber Gallagher MD  Dx:   Encounter Diagnosis     ICD-10-CM    1. Lumbar radiculopathy  M54.16 Ambulatory Referral to Pain Management                     Subjective: patient states that she has too much pain. She is not able to function and is crying non stop. Pain shooting into the legs. Objective: See treatment diary below      Assessment: Tolerated treatment fair. Patient exhibited good technique with therapeutic exercises and would benefit from continued PT. Could not change patient's symptoms with stretching, manual therapy, or exercise. Trial of traction again. Referral was placed for pain management. Plan: Continue per plan of care.       Precautions: none      Daily Treatment Diary     Manual                                                                                   Exercise Diary              Hip flexor str             Hamstring str             Piriformis str             LTR             SKTC             SLR             Bridging with ball between knees             Clamshell with band             Standing hip abduction             Standing hip extension                                       Bike                                                                                                            Modalities                           Mechanical traction 15 min 50lbs 30/5

## 2023-08-09 ENCOUNTER — CONSULT (OUTPATIENT)
Dept: PAIN MEDICINE | Facility: CLINIC | Age: 88
End: 2023-08-09
Payer: COMMERCIAL

## 2023-08-09 ENCOUNTER — APPOINTMENT (OUTPATIENT)
Dept: PHYSICAL THERAPY | Facility: MEDICAL CENTER | Age: 88
End: 2023-08-09
Payer: COMMERCIAL

## 2023-08-09 VITALS
SYSTOLIC BLOOD PRESSURE: 152 MMHG | DIASTOLIC BLOOD PRESSURE: 60 MMHG | BODY MASS INDEX: 23.74 KG/M2 | HEIGHT: 62 IN | WEIGHT: 129 LBS

## 2023-08-09 DIAGNOSIS — M54.16 LUMBAR RADICULOPATHY: Primary | ICD-10-CM

## 2023-08-09 DIAGNOSIS — M48.061 SPINAL STENOSIS OF LUMBAR REGION, UNSPECIFIED WHETHER NEUROGENIC CLAUDICATION PRESENT: ICD-10-CM

## 2023-08-09 PROCEDURE — 99204 OFFICE O/P NEW MOD 45 MIN: CPT | Performed by: ANESTHESIOLOGY

## 2023-08-09 RX ORDER — GABAPENTIN 300 MG/1
CAPSULE ORAL
COMMUNITY
Start: 2023-08-01

## 2023-08-09 RX ORDER — CYCLOSPORINE 0.5 MG/ML
EMULSION OPHTHALMIC
COMMUNITY
Start: 2023-06-09

## 2023-08-09 RX ORDER — CHOLECALCIFEROL (VITAMIN D3) 1250 MCG
1 CAPSULE ORAL DAILY
COMMUNITY

## 2023-08-09 RX ORDER — TRAMADOL HYDROCHLORIDE 50 MG/1
TABLET ORAL
COMMUNITY
Start: 2023-08-04

## 2023-08-09 RX ORDER — ASPIRIN 81 MG/1
TABLET, CHEWABLE ORAL
COMMUNITY
Start: 2022-01-28

## 2023-08-09 RX ORDER — MULTIVIT-MIN/IRON/FOLIC ACID/K 18-600-40
CAPSULE ORAL
COMMUNITY

## 2023-08-09 NOTE — PROGRESS NOTES
Assessment  1. Lumbar radiculopathy    2. Spinal stenosis of lumbar region, unspecified whether neurogenic claudication present        Plan  Patient presenting with chronic back with right radicular leg pain for > 1 year, worsening in the past 6 weeks. Pain is consistent with lumbar radicular pain in the right L4/L5 dermatomal distribution accompanied by pain >7/10 on the pain scale with inability to participate in IADLs for >6 weeks. Patient has fully participated with physical therapy as well as home exercises and stretches. Has been taking gabapentin and Tramadol with modest benefit. Denies any gait instability, saddle anesthesia. In regards to the patient's lumbar pathology, we discussed the various treatment options including physical therapy, chiropractic treatment, medication management, activity modifications, interventional spine procedures. Given that patient has not had any benefit with conservative treatments, I think patient would benefit from targeted interventional treatment modalities. Previously had 3 ESIs with Dr. Roney Oppenheim around 2017 - had overall 50% improvement with series of ESIs. Had 2 L5-S1 LESIs and one left L4 and L5 TF MARGOT. MRI with severe stenosis at L4-5. I recommend proceeding with a right L5-S1 LESI. Risks, benefits, and alternatives to epidural steroid injections thoroughly discussed with patient. Complete risks and benefits including bleeding, infection, tissue reaction, nerve injury and allergic reaction were discussed. The approach was demonstrated using models and literature was provided. Verbal consent was obtained. Reviewed and interpreted relevant imaging studies - specifically lumbar MRI and discussed the results and clinical significance with the patient.     Reviewed external notes from the relevant aspects of the patient's medical record, specifically prior pain mangement , PCP, PT notes in regards to current and prior treatments tried (as mentioned in history of present illness). Reviewed pertinent laboratory studies, specifically renal function, hemoglobin A1c, CBC, coagulation studies, prior to recommending medication therapies/interventional treatment options. [place] Prescription Drug Monitoring Program report was reviewed and was appropriate     My impressions and treatment recommendations were discussed in detail with the patient who verbalized understanding and had no further questions. Discharge instructions were provided. I personally saw and examined the patient and I agree with the above discussed plan of care. Orders Placed This Encounter   Procedures   • [place] spine and pain procedure     Standing Status:   Future     Standing Expiration Date:   8/9/2027     Order Specific Question:   Reason for Exam:     Answer:   right L5-S1 LESI     Order Specific Question:   Anticoagulant hold needed? Answer:   no     New Medications Ordered This Visit   Medications   • gabapentin (NEURONTIN) 300 mg capsule     Sig: TAKE 1 CAPSULE BY MOUTH EVERY DAY AT BEDTIME ALONG WITH TWO 100MG CAPSULES AT BEDTIME   • traMADol (ULTRAM) 50 mg tablet     Sig: TAKE 1 TABLET BY MOUTH AT BEDTIME FOR BACK PAIN   • Multiple Vitamins-Minerals (ICAPS AREDS 2 PO)   • cycloSPORINE (RESTASIS) 0.05 % ophthalmic emulsion     Sig: INSTILL 1 DROP IN [facility] EYE TWO TIMES DAILY   • carboxymethylcellulose 1 % ophthalmic solution   • aspirin (Aspirin 81) 81 mg chewable tablet   • ascorbic acid (VITAMIN C) 1000 MG tablet   • Cholecalciferol (Vitamin D3) 1.25 MG (15267 UT) CAPS     Sig: Take 1 tablet by mouth daily   • Ascorbic Acid (Vitamin C) 500 MG CAPS     Sig: Take by mouth       History of Present Illness    Chapo Go is a 80 y.o. female presenting for new patient visit at [address] [facility] for exam and evaluation of chronic low back and right radicular leg pain for greater than 1 year, worsening over the past 6 weeks.  Pain started without any precipitating injury or trauma. Over the past month, the intensity of pain has been Moderate to severe. Pain is currently 8/10. Pain does interfere with age appropriate activities of daily living. Pain is nearly constant, with no typical pattern throughout the day. Pain is described as burning, shooting, sharp, throbbing. Patient denies weakness in the lower extremities. Assistance device used: None. Pain is increased with lying down, walking. Pain is decreased with resting. Prior pertinent treatments tried: MARGOT, PT, heat/ice. Pertinent medications tried/currently taking: gabapentin, Tramadol      I have personally reviewed and/or updated the patient's past medical history, past surgical history, family history, social history, current medications, allergies, and vital signs today. Review of Systems   Constitutional: Negative for chills and fever. HENT: Negative for ear pain and sore throat. Eyes: Negative for pain and visual disturbance. Respiratory: Negative for cough and shortness of breath. Cardiovascular: Negative for chest pain and palpitations. Gastrointestinal: Negative for abdominal pain and vomiting. Genitourinary: Negative for dysuria and hematuria. Musculoskeletal: Positive for arthralgias, back pain, gait problem and myalgias. Skin: Negative for color change and rash. Neurological: Positive for weakness. Negative for seizures and syncope. All other systems reviewed and are negative. There is no problem list on file for this patient. Past Medical History:   Diagnosis Date   • Hypercholesteremia    • Stroke [facility])        Past Surgical History:   Procedure Laterality Date   • ADENOIDECTOMY     • APPENDECTOMY     • HYSTERECTOMY     • TONSILLECTOMY         History reviewed. No pertinent family history.     Social History     Occupational History   • Not on file   Tobacco Use   • Smoking status: Never   • Smokeless tobacco: Never   Vaping Use   • Vaping Use: Never used   Substance and Sexual Activity   • Alcohol use: Never   • Drug use: Never   • Sexual activity: Not on file       Current Outpatient Medications on File Prior to Visit   Medication Sig   • ascorbic acid (VITAMIN C) 1000 MG tablet    • Ascorbic Acid (Vitamin C) 500 MG CAPS Take by mouth   • aspirin (Aspirin 81) 81 mg chewable tablet    • atorvastatin (LIPITOR) 40 mg tablet Take 40 mg by mouth every evening   • carboxymethylcellulose 1 % ophthalmic solution    • Cholecalciferol (Vitamin D3) 1.25 MG (43074 UT) CAPS Take 1 tablet by mouth daily   • cycloSPORINE (RESTASIS) 0.05 % ophthalmic emulsion INSTILL 1 DROP IN EACH EYE TWO TIMES DAILY   • desoximetasone (TOPICORT) 0.25 % cream Apply topically to skin as needed. • gabapentin (NEURONTIN) 100 mg capsule    • gabapentin (NEURONTIN) 300 mg capsule TAKE 1 CAPSULE BY MOUTH EVERY DAY AT BEDTIME ALONG WITH TWO 100MG CAPSULES AT BEDTIME   • Multiple Vitamins-Minerals (ICAPS AREDS 2 PO)    • traMADol (ULTRAM) 50 mg tablet TAKE 1 TABLET BY MOUTH AT BEDTIME FOR BACK PAIN   • Docusate Sodium 100 MG capsule Take by mouth (Patient not taking: Reported on 8/9/2023)     No current facility-administered medications on file prior to visit. Allergies   Allergen Reactions   • Codeine Nausea Only and GI Intolerance   • Erythromycin Nausea Only and GI Intolerance   • Amoxicillin Rash   • Azithromycin Rash   • Erythromycin Base Rash   • Metoprolol Rash   • Other Rash   • Oxycodone Rash   • Oxycodone-Acetaminophen Rash       Physical Exam    /60   Ht 5' 2" (1.575 m)   Wt 58.5 kg (129 lb)   BMI 23.59 kg/m²     Constitutional: normal, well developed, well nourished, alert, in no distress and non-toxic and no overt pain behavior.   Eyes: anicteric  HEENT: grossly intact  Neck: supple, symmetric, trachea midline and no masses   Pulmonary:even and unlabored  Cardiovascular:No edema or pitting edema present  Skin:Normal without rashes or lesions and well hydrated  Psychiatric:Mood and affect appropriate  Neurologic: Motor function is grossly intact. Musculoskeletal: Gait is slow and unsteady. Positive right SLR. Imaging  MRI LUMBAR SPINE WITHOUT CONTRAST     INDICATION:  Left-sided low back pain radiating to the foot. Foot numbness.     COMPARISON:  None.     TECHNIQUE:  Sagittal T1, sagittal T2, sagittal inversion recovery, axial T1 and axial T2, coronal T2.       IMAGE QUALITY:  Diagnostic     FINDINGS:     ALIGNMENT:  Mild localized dextroscoliosis of the upper lumbar spine centered at L2. Grade 1 anterior spondylolisthesis of L4 upon L5. No compression fracture.     MARROW SIGNAL:  Scattered endplate marrow degenerative change.     DISTAL CORD AND CONUS:  Normal size and signal within the distal cord and conus. The conus ends at the L1 level.     PARASPINAL SOFT TISSUES:  Paraspinal soft tissues are unremarkable.     SACRUM:  Normal signal within the sacrum. No evidence of insufficiency or stress fracture.     LOWER THORACIC DISC SPACES:  Normal disc height and signal.  No disc herniation, canal stenosis or foraminal narrowing.     LUMBAR DISC SPACES:          L1-L2:  Diffuse annular bulging without focal disc herniation. Only mild canal stenosis with slight distortion of the thecal sac. Mild foraminal narrowing without discrete foraminal nerve impingement.     L2-L3:  Mild diffuse annular bulging with a small broad-based left foraminal and lateral disc protrusion with associated endplate hypertrophic osteophyte formation. Only minimal canal stenosis. Mild left greater than right foraminal narrowing.     L3-L4:  Moderate annular bulging. Mild to moderate facet degenerative change. Mild to moderate canal stenosis with mild bilateral foraminal narrowing.     L4-L5:  Diffuse annular bulging. Broad-based central and right foraminal disc protrusions. There is a small left paramedian disc herniation with slight superior extrusion.   Extensive facet hypertrophic degenerative change and ligamentum flavum   thickening. As described above there is grade 1 anterior spondylolisthesis of L4 upon L5. Overall there is severe canal stenosis with distortion of the thecal sac and cauda equina compression best seen on series 8 image 19. There is mild left but more   pronounced moderately severe right foraminal narrowing noted. The left paramedian superior disc extrusion is best seen on series 8 image 17 distorting the left anterolateral aspect of the thecal sac.     L5-S1:  Mild annular bulging. Mild facet arthropathy and ligamentum flavum thickening. Mild canal stenosis. Mild left and moderately severe right foraminal narrowing.     IMPRESSION:     Lumbar degenerative disc disease most pronounced at L4-5 where there is severe canal stenosis as a result of annular bulging, disc herniations and posterior element hypertrophic change. See series 8 image 18. In addition there is a superiorly extruded   left paramedian disc herniation on series 8 image 17 distorting the left anterolateral aspect of the thecal sac with mass effect upon the left L5 nerve.   Correlate for left L5 radiculopathy.     Despite patient's left-sided symptoms foraminal narrowing at L4-5 and L5-S1 is actually significantly more pronounced on the right.     The L1-2, L2-3 and L3-4 levels demonstrate annular bulging and degenerative change resulting in mild to moderate canal stenosis and foraminal narrowing as described above.

## 2023-08-14 ENCOUNTER — TELEPHONE (OUTPATIENT)
Age: 88
End: 2023-08-14

## 2023-08-14 NOTE — TELEPHONE ENCOUNTER
I have tried calling back a number of times today and the phone is just a busy signal. There are no sooner appointments available for procedures at this time.

## 2023-08-14 NOTE — TELEPHONE ENCOUNTER
Caller: patient    Doctor: francia    Reason for call: would like to know if there is any cancelation for procedure    Call back#:

## 2023-08-21 ENCOUNTER — APPOINTMENT (OUTPATIENT)
Dept: PHYSICAL THERAPY | Facility: MEDICAL CENTER | Age: 88
End: 2023-08-21
Payer: COMMERCIAL

## 2023-08-25 ENCOUNTER — APPOINTMENT (OUTPATIENT)
Dept: PHYSICAL THERAPY | Facility: MEDICAL CENTER | Age: 88
End: 2023-08-25
Payer: COMMERCIAL

## 2023-08-28 ENCOUNTER — HOSPITAL ENCOUNTER (OUTPATIENT)
Dept: RADIOLOGY | Facility: MEDICAL CENTER | Age: 88
Discharge: HOME | End: 2023-08-28
Payer: COMMERCIAL

## 2023-08-28 VITALS
OXYGEN SATURATION: 95 % | TEMPERATURE: 98.2 F | HEART RATE: 67 BPM | RESPIRATION RATE: 20 BRPM | SYSTOLIC BLOOD PRESSURE: 189 MMHG | DIASTOLIC BLOOD PRESSURE: 74 MMHG

## 2023-08-28 DIAGNOSIS — M54.16 LUMBAR RADICULOPATHY: ICD-10-CM

## 2023-08-28 PROCEDURE — 62323 NJX INTERLAMINAR LMBR/SAC: CPT | Performed by: ANESTHESIOLOGY

## 2023-08-28 RX ORDER — BUPIVACAINE HCL/PF 2.5 MG/ML
1 VIAL (ML) INJECTION ONCE
Status: COMPLETED | OUTPATIENT
Start: 2023-08-28 | End: 2023-08-28

## 2023-08-28 RX ORDER — METHYLPREDNISOLONE ACETATE 80 MG/ML
80 INJECTION, SUSPENSION INTRA-ARTICULAR; INTRALESIONAL; INTRAMUSCULAR; PARENTERAL; SOFT TISSUE ONCE
Status: COMPLETED | OUTPATIENT
Start: 2023-08-28 | End: 2023-08-28

## 2023-08-28 RX ADMIN — IOHEXOL 1 ML: 300 INJECTION, SOLUTION INTRAVENOUS at 08:56

## 2023-08-28 RX ADMIN — Medication 1 ML: at 08:56

## 2023-08-28 RX ADMIN — METHYLPREDNISOLONE ACETATE 80 MG: 80 INJECTION, SUSPENSION INTRA-ARTICULAR; INTRALESIONAL; INTRAMUSCULAR; PARENTERAL; SOFT TISSUE at 08:56

## 2023-08-28 NOTE — H&P
History of Present Illness: The patient is a 80 y.o. female who presents with complaints of back and leg pain    Past Medical History:   Diagnosis Date   • Hypercholesteremia    • Stroke [facility])        Past Surgical History:   Procedure Laterality Date   • ADENOIDECTOMY     • APPENDECTOMY     • HYSTERECTOMY     • TONSILLECTOMY           Current Outpatient Medications:   •  ascorbic acid (VITAMIN C) 1000 MG tablet, , Disp: , Rfl:   •  Ascorbic Acid (Vitamin C) 500 MG CAPS, Take by mouth, Disp: , Rfl:   •  aspirin (Aspirin 81) 81 mg chewable tablet, , Disp: , Rfl:   •  atorvastatin (LIPITOR) 40 mg tablet, Take 40 mg by mouth every evening, Disp: , Rfl:   •  carboxymethylcellulose 1 % ophthalmic solution, , Disp: , Rfl:   •  Cholecalciferol (Vitamin D3) 1.25 MG (51268 UT) CAPS, Take 1 tablet by mouth daily, Disp: , Rfl:   •  cycloSPORINE (RESTASIS) 0.05 % ophthalmic emulsion, INSTILL 1 DROP IN [facility] EYE TWO TIMES DAILY, Disp: , Rfl:   •  desoximetasone (TOPICORT) 0.25 % cream, Apply topically to skin as needed. , Disp: , Rfl:   •  Docusate Sodium 100 MG capsule, Take by mouth (Patient not taking: Reported on 8/9/2023), Disp: , Rfl:   •  gabapentin (NEURONTIN) 100 mg capsule, , Disp: , Rfl:   •  gabapentin (NEURONTIN) 300 mg capsule, TAKE 1 CAPSULE BY MOUTH EVERY DAY AT BEDTIME ALONG WITH TWO 100MG CAPSULES AT BEDTIME, Disp: , Rfl:   •  Multiple Vitamins-Minerals (ICAPS AREDS 2 PO), , Disp: , Rfl:   •  traMADol (ULTRAM) 50 mg tablet, TAKE 1 TABLET BY MOUTH AT BEDTIME FOR BACK PAIN, Disp: , Rfl:     Current Facility-Administered Medications:   •  bupivacaine (PF) (MARCAINE) 0.25 % injection 1 mL, 1 mL, Epidural, Once, Will Milton Multani MD  •  iohexol (OMNIPAQUE) 300 mg/mL injection 1 mL, 1 mL, Epidural, Once, Will Milton Multani MD  •  methylPREDNISolone acetate (DEPO-MEDROL) injection 80 mg, 80 mg, Epidural, Once, Will Milton Multani MD    Allergies   Allergen Reactions   • Codeine Nausea Only and GI Intolerance   • Erythromycin Nausea Only and GI Intolerance   • Amoxicillin Rash   • Azithromycin Rash   • Erythromycin Base Rash   • Metoprolol Rash   • Other Rash   • Oxycodone Rash   • Oxycodone-Acetaminophen Rash       Physical Exam:   Vitals:    08/28/23 0840   BP: (!) 187/82   Pulse: 76   Resp: 18   Temp: 98.2 °F (36.8 °C)   SpO2: 94%     General: Awake, Alert, Oriented x 3, Mood and affect appropriate  Respiratory: Respirations even and unlabored  Cardiovascular: Peripheral pulses intact; no edema  Musculoskeletal Exam: positive right SLR    ASA Score: 2    Patient/Chart Verification  Patient ID Verified: Verbal  ID Band Applied: No  Consents Confirmed: Procedural, To be obtained in the Pre-Procedure area  H&P( within 30 days) Verified: To be obtained in the Pre-Procedure area  Interval H&P(within 24 hr) Complete (required for Outpatients and Surgery Admit only): To be obtained in the Pre-Procedure area  Allergies Reviewed: Yes  Anticoag/NSAID held?: NA (Pt takes 81 mg aspirin, hold not required for this procedure)  Currently on antibiotics?: No  Pregnancy denied?: NA    Assessment:   1.  Lumbar radiculopathy        Plan: right L5-S1 LESI

## 2023-08-28 NOTE — DISCHARGE INSTRUCTIONS
Epidural Steroid Injection   WHAT YOU NEED TO KNOW:   An epidural steroid injection (MARGOT) is a procedure to inject steroid medicine into the epidural space. The epidural space is between your spinal cord and vertebrae. Steroids reduce inflammation and fluid buildup in your spine that may be causing pain. You may be given pain medicine along with the steroids. ACTIVITY  Do not drive or operate machinery today. No strenuous activity today - bending, lifting, etc.  You may resume normal activites starting tomorrow - start slowly and as tolerated. You may shower today, but no tub baths or hot tubs. You may have numbness for several hours from the local anesthetic. Please use caution and common sense, especially with weight-bearing activities. CARE OF THE INJECTION SITE  If you have soreness or pain, apply ice to the area today (20 minutes on/20 minutes off). Starting tomorrow, you may use warm, moist heat or ice if needed. You may have an increase or change in your discomfort for 36-48 hours after your treatment. Apply ice and continue with any pain medication you have been prescribed. Notify the [facility] if you have any of the following: redness, drainage, swelling, headache, stiff neck or fever above 100°F.    SPECIAL INSTRUCTIONS  Our office will contact you in approximately 7 days for a progress report. MEDICATIONS  Continue to take all routine medications. Our office may have instructed you to hold some medications. As no general anesthesia was used in today's procedure, you should not experience any side effects related to anesthesia. If you are diabetic, the steroids used in today's injection may temporarily increase your blood sugar levels after the first few days after your injection. Please keep a close eye on your sugars and alert the doctor who manages your diabetes if your sugars are significantly high from your baseline or you are symptomatic.      If you have a problem specifically related to your procedure, please call our office at [phone]. Problems not related to your procedure should be directed to your primary care physician.

## 2023-09-05 ENCOUNTER — TELEPHONE (OUTPATIENT)
Dept: PAIN MEDICINE | Facility: CLINIC | Age: 88
End: 2023-09-05

## 2023-09-05 NOTE — TELEPHONE ENCOUNTER
Pt reports 10% improvement post inj    Pain level 7/10  Pt wants to know what medications she can take to help with the pain?

## 2023-09-07 NOTE — TELEPHONE ENCOUNTER
Reviewed with the patient and encouraged ice vs heat and OTC Lidocaine patches. She will CB in a week if she does not feel any better

## 2023-09-11 NOTE — TELEPHONE ENCOUNTER
Caller: Nilsa VIRK    Doctor: Dr Godfrey     Reason for call: Pt is still in a lot of pain , is there anything else the pt can do ?    Call back#: [phone]

## 2023-09-26 ENCOUNTER — OFFICE VISIT (OUTPATIENT)
Dept: PAIN MEDICINE | Facility: CLINIC | Age: 88
End: 2023-09-26
Payer: COMMERCIAL

## 2023-09-26 VITALS
DIASTOLIC BLOOD PRESSURE: 76 MMHG | HEIGHT: 62 IN | WEIGHT: 129 LBS | BODY MASS INDEX: 23.74 KG/M2 | SYSTOLIC BLOOD PRESSURE: 168 MMHG

## 2023-09-26 DIAGNOSIS — M48.062 SPINAL STENOSIS OF LUMBAR REGION WITH NEUROGENIC CLAUDICATION: ICD-10-CM

## 2023-09-26 DIAGNOSIS — M54.16 LUMBAR RADICULOPATHY: Primary | ICD-10-CM

## 2023-09-26 DIAGNOSIS — M47.816 LUMBAR SPONDYLOSIS: ICD-10-CM

## 2023-09-26 PROCEDURE — 99214 OFFICE O/P EST MOD 30 MIN: CPT | Performed by: ANESTHESIOLOGY

## 2023-09-26 RX ORDER — NORTRIPTYLINE HYDROCHLORIDE 10 MG/1
CAPSULE ORAL
Qty: 90 CAPSULE | Refills: 2 | Status: SHIPPED | OUTPATIENT
Start: 2023-09-26

## 2023-09-26 NOTE — PROGRESS NOTES
Assessment:  1. Lumbar radiculopathy    2. Spinal stenosis of lumbar region with neurogenic claudication    3. Lumbar spondylosis        Plan:    Patient presenting for follow up visit. She has a history of chronic low back with right radicular leg pain for > 1 year, worsening in the past several months. Pain is consistent with lumbar radicular pain in the right L4/L5 dermatomal distribution accompanied by pain >7/10 on the pain scale with inability to participate in IADLs for >6 weeks. Patient has fully participated with physical therapy as well as home exercises and stretches.  Has been taking gabapentin and Tramadol with modest benefit. Denies any gait instability, saddle anesthesia.     Previously had 3 ESIs with Dr. Elen Albert around 2017 - had overall 50% improvement with series of ESIs. Had 2 L5-S1 LESIs and one left L4 and L5 TF MARGOT. Patient states that the time these were targeting her left-sided radicular symptoms. She now has more severe right-sided radicular symptoms.     MRI with severe stenosis at L4-5. She failed to derive any sustained benefit following right L5-S1 LESI on 8/28/2023.    - Discussed at this time repeating epidural steroid injection with a right L4 and L5 transforaminal approach. Complete risks and benefits including bleeding, infection, tissue reaction, nerve injury and allergic reaction were discussed. The approach was demonstrated using models and literature was provided. Verbal consent was obtained. - Given severe radicular I we will start her on nortriptyline 10 mg nightly. I instructed her on the uptitration schedule of up to 30 mg nightly if 10 mg and 20 mg do not provide benefit. Risks and benefits discussed.    -Patient will follow-up approximately 4 weeks after epidural steroid injections.     Reviewed and interpreted relevant imaging studies - specifically lumbar MRI and discussed the results and clinical significance with the patient.     Reviewed external notes from the relevant aspects of the patient's medical record, specifically prior pain mangement , PCP, PT notes in regards to current and prior treatments tried (as mentioned in history of present illness).     Reviewed pertinent laboratory studies, specifically renal function, hemoglobin A1c, CBC, coagulation studies, prior to recommending medication therapies/interventional treatment options.     [place] Prescription Drug Monitoring Program report was reviewed and was appropriate      My impressions and treatment recommendations were discussed in detail with the patient who verbalized understanding and had no further questions. Discharge instructions were provided. I personally saw and examined the patient and I agree with the above discussed plan of care. Orders Placed This Encounter   Procedures   • [place] spine and pain procedure     Standing Status:   Future     Standing Expiration Date:   9/26/2027     Order Specific Question:   Reason for Exam:     Answer:   right L4 and L5 TF MARGOT     Order Specific Question:   Anticoagulant hold needed? Answer:   no     New Medications Ordered This Visit   Medications   • nortriptyline (PAMELOR) 10 mg capsule     Sig: Take 10mg at bedtime for 3 nights, then 20mg at bedtime for 3 nights, then 30mg at bed time     Dispense:  90 capsule     Refill:  2       History of Present Illness:  Cleo Enciso is a 80 y.o. female who presents for a follow up office visit in regards to Back Pain. The patient’s current symptoms include unchanged severe right lumbar radicular pain that is rated a 9/10. Pain is described as a constant burning, throbbing pain with numbness and pins-and-needles. I have personally reviewed and/or updated the patient's past medical history, past surgical history, family history, social history, current medications, allergies, and vital signs today. Review of Systems   Constitutional: Negative for chills and fever.    HENT: Negative for ear pain and sore throat. Eyes: Negative for pain and visual disturbance. Respiratory: Negative for cough and shortness of breath. Cardiovascular: Negative for chest pain and palpitations. Gastrointestinal: Negative for abdominal pain and vomiting. Genitourinary: Negative for dysuria and hematuria. Musculoskeletal: Positive for back pain and myalgias. Negative for arthralgias. Skin: Negative for color change and rash. Neurological: Negative for seizures and syncope. All other systems reviewed and are negative. Patient Active Problem List   Diagnosis   • Lumbar radiculopathy       Past Medical History:   Diagnosis Date   • Hypercholesteremia    • Stroke [facility])        Past Surgical History:   Procedure Laterality Date   • ADENOIDECTOMY     • APPENDECTOMY     • HYSTERECTOMY     • TONSILLECTOMY         History reviewed. No pertinent family history. Social History     Occupational History   • Not on file   Tobacco Use   • Smoking status: Never   • Smokeless tobacco: Never   Vaping Use   • Vaping Use: Never used   Substance and Sexual Activity   • Alcohol use: Never   • Drug use: Never   • Sexual activity: Not on file       Current Outpatient Medications on File Prior to Visit   Medication Sig   • ascorbic acid (VITAMIN C) 1000 MG tablet    • aspirin (Aspirin 81) 81 mg chewable tablet    • atorvastatin (LIPITOR) 40 mg tablet Take 40 mg by mouth every evening   • carboxymethylcellulose 1 % ophthalmic solution    • Cholecalciferol (Vitamin D3) 1.25 MG (43829 UT) CAPS Take 1 tablet by mouth daily   • cycloSPORINE (RESTASIS) 0.05 % ophthalmic emulsion INSTILL 1 DROP IN EACH EYE TWO TIMES DAILY   • desoximetasone (TOPICORT) 0.25 % cream Apply topically to skin as needed.    • gabapentin (NEURONTIN) 100 mg capsule    • gabapentin (NEURONTIN) 300 mg capsule TAKE 1 CAPSULE BY MOUTH EVERY DAY AT BEDTIME ALONG WITH TWO 100MG CAPSULES AT BEDTIME   • Multiple Vitamins-Minerals (ICAPS AREDS 2 PO)    • traMADol (ULTRAM) 50 mg tablet TAKE 1 TABLET BY MOUTH AT BEDTIME FOR BACK PAIN   • Ascorbic Acid (Vitamin C) 500 MG CAPS Take by mouth   • Docusate Sodium 100 MG capsule Take by mouth (Patient not taking: Reported on 8/9/2023)     No current facility-administered medications on file prior to visit. Allergies   Allergen Reactions   • Codeine Nausea Only and GI Intolerance   • Erythromycin Nausea Only and GI Intolerance   • Amoxicillin Rash   • Azithromycin Rash   • Erythromycin Base Rash   • Metoprolol Rash   • Other Rash   • Oxycodone Rash   • Oxycodone-Acetaminophen Rash       Physical Exam:    /76   Ht 5' 2" (1.575 m)   Wt 58.5 kg (129 lb)   BMI 23.59 kg/m²     Constitutional:normal, well developed, well nourished, alert, in no distress and non-toxic and no overt pain behavior. Eyes:anicteric  HEENT:grossly intact  Neck:supple, symmetric, trachea midline and no masses   Pulmonary:even and unlabored  Cardiovascular:No edema or pitting edema present  Skin:Normal without rashes or lesions and well hydrated  Psychiatric:Mood and affect appropriate  Neurologic: Motor function is grossly intact with no focal neurologic deficits. Musculoskeletal: No swelling, warmth or the right foot/leg. Positive right straight leg raise.  Limited lumbar spine ROM    Imaging  MRI LUMBAR SPINE WITHOUT CONTRAST     INDICATION:  Left-sided low back pain radiating to the foot.  Foot numbness.     COMPARISON:  None.     TECHNIQUE:  Sagittal T1, sagittal T2, sagittal inversion recovery, axial T1 and axial T2, coronal T2.       IMAGE QUALITY:  Diagnostic     FINDINGS:     ALIGNMENT:  Mild localized dextroscoliosis of the upper lumbar spine centered at L2.  Grade 1 anterior spondylolisthesis of L4 upon L5.  No compression fracture.     MARROW SIGNAL:  Scattered endplate marrow degenerative change.     DISTAL CORD AND CONUS:  Normal size and signal within the distal cord and conus.  The conus ends at the L1 level.     PARASPINAL SOFT TISSUES:  Paraspinal soft tissues are unremarkable.     SACRUM:  Normal signal within the sacrum.  No evidence of insufficiency or stress fracture.     LOWER THORACIC DISC SPACES:  Normal disc height and signal.  No disc herniation, canal stenosis or foraminal narrowing.     LUMBAR DISC SPACES:          L1-L2:  Diffuse annular bulging without focal disc herniation.  Only mild canal stenosis with slight distortion of the thecal sac.  Mild foraminal narrowing without discrete foraminal nerve impingement.     L2-L3:  Mild diffuse annular bulging with a small broad-based left foraminal and lateral disc protrusion with associated endplate hypertrophic osteophyte formation.  Only minimal canal stenosis.  Mild left greater than right foraminal narrowing.     L3-L4:  Moderate annular bulging.  Mild to moderate facet degenerative change.  Mild to moderate canal stenosis with mild bilateral foraminal narrowing.     L4-L5:  Diffuse annular bulging.  Broad-based central and right foraminal disc protrusions.  There is a small left paramedian disc herniation with slight superior extrusion.  Extensive facet hypertrophic degenerative change and ligamentum flavum   thickening.  As described above there is grade 1 anterior spondylolisthesis of L4 upon L5.  Overall there is severe canal stenosis with distortion of the thecal sac and cauda equina compression best seen on series 8 image 19.  There is mild left but more   pronounced moderately severe right foraminal narrowing noted.  The left paramedian superior disc extrusion is best seen on series 8 image 17 distorting the left anterolateral aspect of the thecal sac.     L5-S1:  Mild annular bulging.  Mild facet arthropathy and ligamentum flavum thickening.  Mild canal stenosis.  Mild left and moderately severe right foraminal narrowing.     IMPRESSION:     Lumbar degenerative disc disease most pronounced at L4-5 where there is severe canal stenosis as a result of annular bulging, disc herniations and posterior element hypertrophic change.  See series 8 image 18.  In addition there is a superiorly extruded   left paramedian disc herniation on series 8 image 17 distorting the left anterolateral aspect of the thecal sac with mass effect upon the left L5 nerve.  Correlate for left L5 radiculopathy.     Despite patient's left-sided symptoms foraminal narrowing at L4-5 and L5-S1 is actually significantly more pronounced on the right.     The L1-2, L2-3 and L3-4 levels demonstrate annular bulging and degenerative change resulting in mild to moderate canal stenosis and foraminal narrowing as described above.

## 2023-10-02 ENCOUNTER — TELEPHONE (OUTPATIENT)
Dept: PAIN MEDICINE | Facility: CLINIC | Age: 88
End: 2023-10-02

## 2023-10-02 NOTE — TELEPHONE ENCOUNTER
S/W pt. She stated her last dose of nortriptyline was Saturday night and she doesn't want to take it anymore. She is still itchy and has a rash. Cream helps a little. She feels like bugs are crawling all over her. She didn't eat supper last night b/c she felt sick. She didn't sleep for 2 days. She doesn't feel herself. Her leg is better today than yesterday. She doesn't want other medication and she will wait for the injection. Please advise.

## 2023-10-02 NOTE — TELEPHONE ENCOUNTER
Caller: Malachi Peña    Doctor: Radha Moreno    Reason for call: the nortriptyline is giving her a lot of side effects she feels like she has bugs all over her very itchty made her sick in her stomache, having alot of pain in r leg she can go on will stop taking this medication she will see you on the 12 th to get her shot. Doesn't want to take anything else.      Call back#: [phone]

## 2023-10-12 ENCOUNTER — APPOINTMENT (OUTPATIENT)
Dept: LAB | Facility: MEDICAL CENTER | Age: 88
End: 2023-10-12
Payer: COMMERCIAL

## 2023-10-12 ENCOUNTER — HOSPITAL ENCOUNTER (OUTPATIENT)
Dept: RADIOLOGY | Facility: MEDICAL CENTER | Age: 88
End: 2023-10-12
Payer: COMMERCIAL

## 2023-10-12 VITALS
TEMPERATURE: 98.4 F | HEART RATE: 78 BPM | DIASTOLIC BLOOD PRESSURE: 80 MMHG | SYSTOLIC BLOOD PRESSURE: 187 MMHG | RESPIRATION RATE: 18 BRPM | OXYGEN SATURATION: 99 %

## 2023-10-12 DIAGNOSIS — E78.49 OTHER HYPERLIPIDEMIA: ICD-10-CM

## 2023-10-12 DIAGNOSIS — M54.16 LUMBAR RADICULOPATHY: ICD-10-CM

## 2023-10-12 DIAGNOSIS — I10 HYPERTENSION, ESSENTIAL: ICD-10-CM

## 2023-10-12 LAB
ALBUMIN SERPL BCP-MCNC: 4.1 G/DL (ref 3.5–5)
ALP SERPL-CCNC: 72 U/L (ref 34–104)
ALT SERPL W P-5'-P-CCNC: 15 U/L (ref 7–52)
ANION GAP SERPL CALCULATED.3IONS-SCNC: 6 MMOL/L
AST SERPL W P-5'-P-CCNC: 21 U/L (ref 13–39)
BASOPHILS # BLD AUTO: 0.11 THOUSANDS/ÂΜL (ref 0–0.1)
BASOPHILS NFR BLD AUTO: 1 % (ref 0–1)
BILIRUB SERPL-MCNC: 0.45 MG/DL (ref 0.2–1)
BUN SERPL-MCNC: 26 MG/DL (ref 5–25)
CALCIUM SERPL-MCNC: 9.3 MG/DL (ref 8.4–10.2)
CHLORIDE SERPL-SCNC: 106 MMOL/L (ref 96–108)
CHOLEST SERPL-MCNC: 117 MG/DL
CO2 SERPL-SCNC: 29 MMOL/L (ref 21–32)
CREAT SERPL-MCNC: 1.05 MG/DL (ref 0.6–1.3)
EOSINOPHIL # BLD AUTO: 0.67 THOUSAND/ÂΜL (ref 0–0.61)
EOSINOPHIL NFR BLD AUTO: 7 % (ref 0–6)
ERYTHROCYTE [DISTWIDTH] IN BLOOD BY AUTOMATED COUNT: 16.2 % (ref 11.6–15.1)
GFR SERPL CREATININE-BSD FRML MDRD: 47 ML/MIN/1.73SQ M
GLUCOSE P FAST SERPL-MCNC: 96 MG/DL (ref 65–99)
HCT VFR BLD AUTO: 29.4 % (ref 34.8–46.1)
HDLC SERPL-MCNC: 39 MG/DL
HGB BLD-MCNC: 9.5 G/DL (ref 11.5–15.4)
IMM GRANULOCYTES # BLD AUTO: 0.03 THOUSAND/UL (ref 0–0.2)
IMM GRANULOCYTES NFR BLD AUTO: 0 % (ref 0–2)
LDLC SERPL CALC-MCNC: 50 MG/DL (ref 0–100)
LYMPHOCYTES # BLD AUTO: 2.71 THOUSANDS/ÂΜL (ref 0.6–4.47)
LYMPHOCYTES NFR BLD AUTO: 30 % (ref 14–44)
MCH RBC QN AUTO: 31 PG (ref 26.8–34.3)
MCHC RBC AUTO-ENTMCNC: 32.3 G/DL (ref 31.4–37.4)
MCV RBC AUTO: 96 FL (ref 82–98)
MONOCYTES # BLD AUTO: 1 THOUSAND/ÂΜL (ref 0.17–1.22)
MONOCYTES NFR BLD AUTO: 11 % (ref 4–12)
NEUTROPHILS # BLD AUTO: 4.58 THOUSANDS/ÂΜL (ref 1.85–7.62)
NEUTS SEG NFR BLD AUTO: 51 % (ref 43–75)
NONHDLC SERPL-MCNC: 78 MG/DL
NRBC BLD AUTO-RTO: 0 /100 WBCS
PLATELET # BLD AUTO: 827 THOUSANDS/UL (ref 149–390)
PMV BLD AUTO: 10.7 FL (ref 8.9–12.7)
POTASSIUM SERPL-SCNC: 4.1 MMOL/L (ref 3.5–5.3)
PROT SERPL-MCNC: 6.7 G/DL (ref 6.4–8.4)
RBC # BLD AUTO: 3.06 MILLION/UL (ref 3.81–5.12)
SODIUM SERPL-SCNC: 141 MMOL/L (ref 135–147)
TRIGL SERPL-MCNC: 142 MG/DL
TSH SERPL DL<=0.05 MIU/L-ACNC: 4.18 UIU/ML (ref 0.45–4.5)
WBC # BLD AUTO: 9.1 THOUSAND/UL (ref 4.31–10.16)

## 2023-10-12 PROCEDURE — 80053 COMPREHEN METABOLIC PANEL: CPT

## 2023-10-12 PROCEDURE — 80061 LIPID PANEL: CPT

## 2023-10-12 PROCEDURE — 84443 ASSAY THYROID STIM HORMONE: CPT

## 2023-10-12 PROCEDURE — 64484 NJX AA&/STRD TFRM EPI L/S EA: CPT | Performed by: ANESTHESIOLOGY

## 2023-10-12 PROCEDURE — 85025 COMPLETE CBC W/AUTO DIFF WBC: CPT

## 2023-10-12 PROCEDURE — 64483 NJX AA&/STRD TFRM EPI L/S 1: CPT | Performed by: ANESTHESIOLOGY

## 2023-10-12 PROCEDURE — 36415 COLL VENOUS BLD VENIPUNCTURE: CPT

## 2023-10-12 RX ORDER — BUPIVACAINE HCL/PF 2.5 MG/ML
3.5 VIAL (ML) INJECTION ONCE
Status: COMPLETED | OUTPATIENT
Start: 2023-10-12 | End: 2023-10-12

## 2023-10-12 RX ORDER — PAPAVERINE HCL 150 MG
15 CAPSULE, EXTENDED RELEASE ORAL ONCE
Status: COMPLETED | OUTPATIENT
Start: 2023-10-12 | End: 2023-10-12

## 2023-10-12 RX ADMIN — Medication 3.5 ML: at 08:21

## 2023-10-12 RX ADMIN — IOHEXOL 2 ML: 300 INJECTION, SOLUTION INTRAVENOUS at 08:21

## 2023-10-12 RX ADMIN — DEXAMETHASONE SODIUM PHOSPHATE 15 MG: 10 INJECTION INTRAMUSCULAR; INTRAVENOUS at 08:21

## 2023-10-12 NOTE — DISCHARGE INSTRUCTIONS
Epidural Steroid Injection   WHAT YOU NEED TO KNOW:   An epidural steroid injection (MARGOT) is a procedure to inject steroid medicine into the epidural space. The epidural space is between your spinal cord and vertebrae. Steroids reduce inflammation and fluid buildup in your spine that may be causing pain. You may be given pain medicine along with the steroids. ACTIVITY  Do not drive or operate machinery today. No strenuous activity today - bending, lifting, etc.  You may resume normal activites starting tomorrow - start slowly and as tolerated. You may shower today, but no tub baths or hot tubs. You may have numbness for several hours from the local anesthetic. Please use caution and common sense, especially with weight-bearing activities. CARE OF THE INJECTION SITE  If you have soreness or pain, apply ice to the area today (20 minutes on/20 minutes off). Starting tomorrow, you may use warm, moist heat or ice if needed. You may have an increase or change in your discomfort for 36-48 hours after your treatment. Apply ice and continue with any pain medication you have been prescribed. Notify [facility] if you have any of the following: redness, drainage, swelling, headache, stiff neck or fever above 100°F.    SPECIAL INSTRUCTIONS  Our office will contact you in approximately 7 days for a progress report. MEDICATIONS  Continue to take all routine medications. Our office may have instructed you to hold some medications. As no general anesthesia was used in today's procedure, you should not experience any side effects related to anesthesia. If you are diabetic, the steroids used in today's injection may temporarily increase your blood sugar levels after the first few days after your injection. Please keep a close eye on your sugars and alert the doctor who manages your diabetes if your sugars are significantly high from your baseline or you are symptomatic.      If you have a problem specifically related to your procedure, please call our office at [phone]. Problems not related to your procedure should be directed to your primary care physician.

## 2023-10-12 NOTE — H&P
History of Present Illness: The patient is a 80 y.o. female who presents with complaints of back and leg pain    Past Medical History:   Diagnosis Date    Hypercholesteremia     Stroke [facility])        Past Surgical History:   Procedure Laterality Date    ADENOIDECTOMY      APPENDECTOMY      HYSTERECTOMY      TONSILLECTOMY           Current Outpatient Medications:     ascorbic acid (VITAMIN C) 1000 MG tablet, , Disp: , Rfl:     Ascorbic Acid (Vitamin C) 500 MG CAPS, Take by mouth, Disp: , Rfl:     aspirin (Aspirin 81) 81 mg chewable tablet, , Disp: , Rfl:     atorvastatin (LIPITOR) 40 mg tablet, Take 40 mg by mouth every evening, Disp: , Rfl:     carboxymethylcellulose 1 % ophthalmic solution, , Disp: , Rfl:     Cholecalciferol (Vitamin D3) 1.25 MG (34641 UT) CAPS, Take 1 tablet by mouth daily, Disp: , Rfl:     cycloSPORINE (RESTASIS) 0.05 % ophthalmic emulsion, INSTILL 1 DROP IN [facility] EYE TWO TIMES DAILY, Disp: , Rfl:     desoximetasone (TOPICORT) 0.25 % cream, Apply topically to skin as needed. , Disp: , Rfl:     Docusate Sodium 100 MG capsule, Take by mouth (Patient not taking: Reported on 8/9/2023), Disp: , Rfl:     gabapentin (NEURONTIN) 100 mg capsule, , Disp: , Rfl:     gabapentin (NEURONTIN) 300 mg capsule, TAKE 1 CAPSULE BY MOUTH EVERY DAY AT BEDTIME ALONG WITH TWO 100MG CAPSULES AT BEDTIME, Disp: , Rfl:     Multiple Vitamins-Minerals (ICAPS AREDS 2 PO), , Disp: , Rfl:     nortriptyline (PAMELOR) 10 mg capsule, Take 10mg at bedtime for 3 nights, then 20mg at bedtime for 3 nights, then 30mg at bed time, Disp: 90 capsule, Rfl: 2    traMADol (ULTRAM) 50 mg tablet, TAKE 1 TABLET BY MOUTH AT BEDTIME FOR BACK PAIN, Disp: , Rfl:     Allergies   Allergen Reactions    Codeine Nausea Only and GI Intolerance    Erythromycin Nausea Only and GI Intolerance    Amoxicillin Rash    Azithromycin Rash    Erythromycin Base Rash    Metoprolol Rash    Other Rash    Oxycodone Rash    Oxycodone-Acetaminophen Rash       Physical Exam: Vitals:    10/12/23 0759   BP: (!) 193/71   Pulse: 74   Resp: 18   Temp: 98.4 °F (36.9 °C)   SpO2: 97%       General: Awake, Alert, Oriented x 3, Mood and affect appropriate  Respiratory: Respirations even and unlabored  Cardiovascular: Peripheral pulses intact; no edema  Musculoskeletal Exam: positive right SLR    ASA Score: 2    Patient/Chart Verification  Patient ID Verified: Verbal  Consents Confirmed: Procedural, To be obtained in the Pre-Procedure area  H&P( within 30 days) Verified: To be obtained in the Pre-Procedure area  Allergies Reviewed: Yes  Anticoag/NSAID held?: NA  Currently on antibiotics?: No  Pregnancy denied?: NA    Assessment:   1.  Lumbar radiculopathy        Plan: right L4 and L5 TF MARGOT

## 2023-10-19 ENCOUNTER — TELEPHONE (OUTPATIENT)
Dept: PAIN MEDICINE | Facility: CLINIC | Age: 88
End: 2023-10-19

## 2023-10-19 NOTE — TELEPHONE ENCOUNTER
Caller: Nilsa Morales   Doctor/office: Dr Godfrey   CB#: [phone]    % of improvement:   Pain Scale (1-10):       Patient fell and hurt herself unable to give % of improvement

## 2023-11-08 ENCOUNTER — TELEPHONE (OUTPATIENT)
Age: 88
End: 2023-11-08

## 2023-11-08 ENCOUNTER — OFFICE VISIT (OUTPATIENT)
Dept: PAIN MEDICINE | Facility: CLINIC | Age: 88
End: 2023-11-08
Payer: COMMERCIAL

## 2023-11-08 VITALS
HEIGHT: 62 IN | DIASTOLIC BLOOD PRESSURE: 86 MMHG | WEIGHT: 129 LBS | SYSTOLIC BLOOD PRESSURE: 176 MMHG | BODY MASS INDEX: 23.74 KG/M2

## 2023-11-08 DIAGNOSIS — M54.16 LUMBAR RADICULITIS: Primary | ICD-10-CM

## 2023-11-08 DIAGNOSIS — M47.816 LUMBAR SPONDYLOSIS: ICD-10-CM

## 2023-11-08 DIAGNOSIS — M54.16 LUMBAR RADICULOPATHY: Primary | ICD-10-CM

## 2023-11-08 DIAGNOSIS — M48.061 SPINAL STENOSIS OF LUMBAR REGION, UNSPECIFIED WHETHER NEUROGENIC CLAUDICATION PRESENT: ICD-10-CM

## 2023-11-08 DIAGNOSIS — M48.061 LUMBAR FORAMINAL STENOSIS: ICD-10-CM

## 2023-11-08 PROCEDURE — 99214 OFFICE O/P EST MOD 30 MIN: CPT | Performed by: ANESTHESIOLOGY

## 2023-11-08 RX ORDER — ESCITALOPRAM OXALATE 5 MG/1
TABLET ORAL
COMMUNITY
Start: 2023-10-19

## 2023-11-08 NOTE — TELEPHONE ENCOUNTER
Caller: Nimco Myers    Doctor: Eugenia Espinoza    Reason for call: Pt needs a referral for PT in chart so she can be scheduled.     Please advise    Call back#: [phone]

## 2023-11-08 NOTE — PROGRESS NOTES
Assessment:  1. Lumbar radiculitis    2. Lumbar foraminal stenosis    3. Spinal stenosis of lumbar region, unspecified whether neurogenic claudication present    4. Lumbar spondylosis        Plan:    Patient presenting for follow up visit. She has a history of chronic low back with right radicular leg pain for > 1 year. Pain is consistent with lumbar radicular pain in the right L4/L5 dermatomal distribution accompanied by pain >7/10 on the pain scale with inability to participate in IADLs for >6 weeks. Patient has fully participated with physical therapy as well as home exercises and stretches. Has been taking gabapentin and Tramadol with modest benefit. Denies any gait instability, saddle anesthesia. Previously had 3 ESIs with Dr. Verito Sanders around 2017 - had overall 50% improvement with series of ESIs. Had 2 L5-S1 LESIs and one left L4 and L5 TF MARGOT. Patient states that the time these were targeting her left-sided radicular symptoms. She now has more severe right-sided radicular symptoms. MRI with severe stenosis at L4-5. She failed to derive any sustained benefit following right L5-S1 LESI on 8/28/2023. After repeating MARGOT with a right L4 and L5 transforaminal approach, she has noticed >70% improvement of her symptoms. She was also recently treated at [facility] with a right deep peroneal nerve injection with benefit. I encouraged her to continue to get treated for her right foot as that podiatric issues can translate mechanical stress to the lumbar spine, etc.    I also encouraged her to restart physical therapy and transition to a home exercise program.    She stopped nortriptyline previously due to side effects with a rash and pruritus. Patient will follow up in 2 months for reevaluation. Reviewed and interpreted relevant imaging studies - specifically lumbar MRI and discussed the results and clinical significance with the patient.      Reviewed external notes from the relevant aspects of the patient's medical record, specifically prior pain mangement , PCP, PT notes in regards to current and prior treatments tried (as mentioned in history of present illness). Reviewed pertinent laboratory studies, specifically renal function, hemoglobin A1c, CBC, coagulation studies, prior to recommending medication therapies/interventional treatment options. [place] Prescription Drug Monitoring Program report was reviewed and was appropriate     My impressions and treatment recommendations were discussed in detail with the patient who verbalized understanding and had no further questions. Discharge instructions were provided. I personally saw and examined the patient and I agree with the above discussed plan of care. No orders of the defined types were placed in this encounter. New Medications Ordered This Visit   Medications    escitalopram (LEXAPRO) 5 mg tablet     Si po in the afternoon       History of Present Illness:  Coco Goins is a 80 y.o. female who presents for a follow up office visit in regards to Back Pain. The patient’s current symptoms include improved low back and right radicular leg symptoms. Pain is rated a 3/10 and described as an intermittent throbbing pain. I have personally reviewed and/or updated the patient's past medical history, past surgical history, family history, social history, current medications, allergies, and vital signs today. Review of Systems   Constitutional:  Negative for chills and fever. HENT:  Negative for ear pain and sore throat. Eyes:  Negative for pain and visual disturbance. Respiratory:  Negative for cough and shortness of breath. Cardiovascular:  Negative for chest pain and palpitations. Gastrointestinal:  Negative for abdominal pain and vomiting. Genitourinary:  Negative for dysuria and hematuria. Musculoskeletal:  Positive for back pain, gait problem and myalgias. Negative for arthralgias.    Skin: Negative for color change and rash. Neurological:  Negative for seizures and syncope. All other systems reviewed and are negative. Patient Active Problem List   Diagnosis    Lumbar radiculopathy       Past Medical History:   Diagnosis Date    Hypercholesteremia     Stroke [facility])        Past Surgical History:   Procedure Laterality Date    ADENOIDECTOMY      APPENDECTOMY      HYSTERECTOMY      TONSILLECTOMY         History reviewed. No pertinent family history. Social History     Occupational History    Not on file   Tobacco Use    Smoking status: Never    Smokeless tobacco: Never   Vaping Use    Vaping Use: Never used   Substance and Sexual Activity    Alcohol use: Never    Drug use: Never    Sexual activity: Not on file       Current Outpatient Medications on File Prior to Visit   Medication Sig    ascorbic acid (VITAMIN C) 1000 MG tablet     aspirin (Aspirin 81) 81 mg chewable tablet     atorvastatin (LIPITOR) 40 mg tablet Take 40 mg by mouth every evening    carboxymethylcellulose 1 % ophthalmic solution     Cholecalciferol (Vitamin D3) 1.25 MG (80603 UT) CAPS Take 1 tablet by mouth daily    cycloSPORINE (RESTASIS) 0.05 % ophthalmic emulsion INSTILL 1 DROP IN EACH EYE TWO TIMES DAILY    desoximetasone (TOPICORT) 0.25 % cream Apply topically to skin as needed.     escitalopram (LEXAPRO) 5 mg tablet 1 po in the afternoon    gabapentin (NEURONTIN) 100 mg capsule     gabapentin (NEURONTIN) 300 mg capsule TAKE 1 CAPSULE BY MOUTH EVERY DAY AT BEDTIME ALONG WITH TWO 100MG CAPSULES AT BEDTIME    Multiple Vitamins-Minerals (ICAPS AREDS 2 PO)     nortriptyline (PAMELOR) 10 mg capsule Take 10mg at bedtime for 3 nights, then 20mg at bedtime for 3 nights, then 30mg at bed time    traMADol (ULTRAM) 50 mg tablet TAKE 1 TABLET BY MOUTH AT BEDTIME FOR BACK PAIN    Ascorbic Acid (Vitamin C) 500 MG CAPS Take by mouth    Docusate Sodium 100 MG capsule Take by mouth (Patient not taking: Reported on 8/9/2023)     No current facility-administered medications on file prior to visit. Allergies   Allergen Reactions    Codeine Nausea Only and GI Intolerance    Erythromycin Nausea Only and GI Intolerance    Amoxicillin Rash    Azithromycin Rash    Erythromycin Base Rash    Metoprolol Rash    Other Rash    Oxycodone Rash    Oxycodone-Acetaminophen Rash       Physical Exam:    BP (!) 176/86   Ht 5' 2" (1.575 m)   Wt 58.5 kg (129 lb)   BMI 23.59 kg/m²     Constitutional:normal, well developed, well nourished, alert, in no distress and non-toxic and no overt pain behavior. Eyes:anicteric  HEENT:grossly intact  Neck:supple, symmetric, trachea midline and no masses   Pulmonary:even and unlabored  Cardiovascular:No edema or pitting edema present  Skin:Normal without rashes or lesions and well hydrated  Psychiatric:Mood and affect appropriate  Neurologic: Motor function is grossly intact with no focal neurologic deficits   Musculoskeletal: Gait is nonantalgic.     Imaging

## 2023-12-12 ENCOUNTER — EVALUATION (OUTPATIENT)
Dept: PHYSICAL THERAPY | Facility: MEDICAL CENTER | Age: 88
End: 2023-12-12
Payer: COMMERCIAL

## 2023-12-12 DIAGNOSIS — M54.40 CHRONIC BILATERAL LOW BACK PAIN WITH SCIATICA, SCIATICA LATERALITY UNSPECIFIED: Primary | ICD-10-CM

## 2023-12-12 DIAGNOSIS — G89.29 CHRONIC BILATERAL LOW BACK PAIN WITH SCIATICA, SCIATICA LATERALITY UNSPECIFIED: Primary | ICD-10-CM

## 2023-12-12 PROCEDURE — 97162 PT EVAL MOD COMPLEX 30 MIN: CPT | Performed by: PHYSICAL THERAPIST

## 2023-12-12 NOTE — TELEPHONE ENCOUNTER
Additional Information  • Negative: Is this related to a work injury? • Negative: Is this related to an MVA? • Negative: Are you currently recieving homecare services? • Negative: Has the patient had unexplained weight loss? • Negative: Does the patient have a fever? • Negative: Is the patient experiencing urine retention? • Negative: Is the patient experiencing acute drop foot or paralysis? • Negative: Has the patient experienced major trauma? (fall from height, high speed collision, direct blow to spine) and is also experiencing nausea, light-headedness, or loss of consciousness? • Negative: Is the patient experiencing blood in sputum? • Negative: Is this a chronic condition?     Protocols used: Mike5 LINDSAY Shankar

## 2023-12-12 NOTE — PROGRESS NOTES
PT Evaluation     Today's date: 2023  Patient name: Darrius Samaniego  : 2/3/1934  MRN: [id number]  Referring provider: Ilir Mc MD  Dx:   Encounter Diagnosis     ICD-10-CM    1. Chronic bilateral low back pain with sciatica, sciatica laterality unspecified  M54.40     G89.29                      Assessment/Plan  Patient is a very pleasant 79 yo female who presents with lower back pain with right sided radiculopathy. Patient's symptoms are consistent with transverse foraminal stenosis. Patient is having issues with all functional movement and standing however most problematic is her discomfort with attempted sleeping. Patient is currently taking gabapentin and has improved with MAGROT x2 to reduce her pain by 70%. Patient has come to therapy for 2-3 visits to get HEP      Subjective  Patient states that her pain has been better and she feels as though she can exercise at home now. Patient would like to know what to do for exercise and to stay safe. Objective  Red Flags: none  Pain: 3-10  Reflexes:     Right Left   Biceps 2+ 2+   Triceps 2+ 2+   Brachioradialis 2+ 2+   Patellar 1+ 1+   Achilles 1+ 1+   Inverted supinator sign neg neg   clonus neg neg   Babinski neg neg        Posture: loss of lumbar lordosis. AROM: limited in forward flexion and extension. Discomfort with rotation however full movement. PROM: deferred  PAROM: deferred due to pain  Palpation: TTP lumbar spine musculature mostly on the right with limited tolerance to joint testing due to pain   Special Tests: + dural sign right, no UMN signs,   Coordination of Movement/strengthe: Patient demonstrates poor activation of Transversus Abdominus and multifidus force couple and loss of feed forward mechanism with reaching tasks. Patient was able to perform activation with cueing.    Goals:  - Pt I with initial HEP in 1-2 visits    See scanned document for HEP      Precautions: none

## 2023-12-22 ENCOUNTER — OFFICE VISIT (OUTPATIENT)
Dept: PHYSICAL THERAPY | Facility: MEDICAL CENTER | Age: 88
End: 2023-12-22
Payer: COMMERCIAL

## 2023-12-22 DIAGNOSIS — M54.40 CHRONIC BILATERAL LOW BACK PAIN WITH SCIATICA, SCIATICA LATERALITY UNSPECIFIED: Primary | ICD-10-CM

## 2023-12-22 DIAGNOSIS — G89.29 CHRONIC BILATERAL LOW BACK PAIN WITH SCIATICA, SCIATICA LATERALITY UNSPECIFIED: Primary | ICD-10-CM

## 2023-12-22 PROCEDURE — 97110 THERAPEUTIC EXERCISES: CPT | Performed by: PHYSICAL THERAPIST

## 2023-12-22 NOTE — PROGRESS NOTES
Daily Note     Today's date: 2023  Patient name: Nilsa Morales  : 2/3/1934  MRN: [id number]  Referring provider: Jass Godfrey MD  Dx:   Encounter Diagnosis     ICD-10-CM    1. Chronic bilateral low back pain with sciatica, sciatica laterality unspecified  M54.40     G89.29                      Subjective: patient states that she has been trying to do her exercise at home but has not been able to get through all of them.        Objective: See treatment diary below      Assessment: Tolerated treatment well. Patient exhibited good technique with therapeutic exercises and would benefit from continued PT      Plan: Continue per plan of care.      Precautions: none         Daily Treatment Diary     Manual                                                                                   Exercise Diary              Hip flexor str             Hamstring str             Piriformis str             LTR 10            SKTC 10            SLR 10            Bridging with ball between knees 10            Clamshell with band 10            Standing hip abduction 10            Standing hip extension 10                                      Bike                                                                                                            Modalities                           Moist heat

## 2023-12-27 ENCOUNTER — TELEPHONE (OUTPATIENT)
Dept: PAIN MEDICINE | Facility: CLINIC | Age: 88
End: 2023-12-27

## 2024-08-23 ENCOUNTER — TELEPHONE (OUTPATIENT)
Age: 89
End: 2024-08-23

## 2024-10-14 ENCOUNTER — RA CDI HCC (OUTPATIENT)
Dept: OTHER | Facility: HOSPITAL | Age: 89
End: 2024-10-14

## 2024-10-14 NOTE — PROGRESS NOTES
HCC coding opportunities          Chart Reviewed number of suggestions sent to Provider: 1  D47.3     Patients Insurance     Medicare Insurance: [facility] [employer] Advantage

## 2024-10-23 ENCOUNTER — OFFICE VISIT (OUTPATIENT)
Dept: FAMILY MEDICINE CLINIC | Facility: CLINIC | Age: 89
End: 2024-10-23
Payer: COMMERCIAL

## 2024-10-23 ENCOUNTER — TELEPHONE (OUTPATIENT)
Dept: ADMINISTRATIVE | Facility: OTHER | Age: 89
End: 2024-10-23

## 2024-10-23 VITALS
RESPIRATION RATE: 16 BRPM | HEART RATE: 67 BPM | DIASTOLIC BLOOD PRESSURE: 72 MMHG | WEIGHT: 124.2 LBS | SYSTOLIC BLOOD PRESSURE: 170 MMHG | OXYGEN SATURATION: 96 % | BODY MASS INDEX: 22.72 KG/M2

## 2024-10-23 DIAGNOSIS — R73.01 ELEVATED FASTING GLUCOSE: ICD-10-CM

## 2024-10-23 DIAGNOSIS — R63.4 WEIGHT LOSS: ICD-10-CM

## 2024-10-23 DIAGNOSIS — Z86.73 HISTORY OF CVA IN ADULTHOOD: Primary | ICD-10-CM

## 2024-10-23 DIAGNOSIS — Z00.00 MEDICARE ANNUAL WELLNESS VISIT, SUBSEQUENT: ICD-10-CM

## 2024-10-23 DIAGNOSIS — E53.8 VITAMIN B 12 DEFICIENCY: ICD-10-CM

## 2024-10-23 DIAGNOSIS — D50.9 IRON DEFICIENCY ANEMIA, UNSPECIFIED IRON DEFICIENCY ANEMIA TYPE: ICD-10-CM

## 2024-10-23 DIAGNOSIS — E78.2 MIXED HYPERLIPIDEMIA: ICD-10-CM

## 2024-10-23 DIAGNOSIS — D47.3 IDIOPATHIC THROMBOCYTHEMIA (HCC): ICD-10-CM

## 2024-10-23 DIAGNOSIS — D75.839 THROMBOCYTOSIS: ICD-10-CM

## 2024-10-23 DIAGNOSIS — L30.9 DERMATITIS: ICD-10-CM

## 2024-10-23 DIAGNOSIS — M48.062 LUMBAR STENOSIS WITH NEUROGENIC CLAUDICATION: ICD-10-CM

## 2024-10-23 DIAGNOSIS — I10 ESSENTIAL HYPERTENSION: ICD-10-CM

## 2024-10-23 PROBLEM — I31.39 PERICARDIAL EFFUSION: Status: ACTIVE | Noted: 2020-08-18

## 2024-10-23 PROBLEM — M51.26 HERNIATION OF LUMBAR INTERVERTEBRAL DISC WITHOUT MYELOPATHY: Status: ACTIVE | Noted: 2017-05-15

## 2024-10-23 PROBLEM — R26.2 DISABILITY OF WALKING: Status: ACTIVE | Noted: 2017-05-18

## 2024-10-23 PROBLEM — D64.9 ANEMIA: Status: ACTIVE | Noted: 2022-02-22

## 2024-10-23 PROCEDURE — 99204 OFFICE O/P NEW MOD 45 MIN: CPT | Performed by: FAMILY MEDICINE

## 2024-10-23 PROCEDURE — G0439 PPPS, SUBSEQ VISIT: HCPCS | Performed by: FAMILY MEDICINE

## 2024-10-23 RX ORDER — METOPROLOL SUCCINATE 25 MG/1
25 TABLET, EXTENDED RELEASE ORAL DAILY
Start: 2024-10-23

## 2024-10-23 RX ORDER — AMLODIPINE BESYLATE 2.5 MG/1
2.5 TABLET ORAL DAILY
Qty: 30 TABLET | Refills: 3 | Status: SHIPPED | OUTPATIENT
Start: 2024-10-23

## 2024-10-23 RX ORDER — BETAMETHASONE DIPROPIONATE 0.5 MG/G
CREAM TOPICAL 2 TIMES DAILY
Qty: 45 G | Refills: 0 | Status: SHIPPED | OUTPATIENT
Start: 2024-10-23

## 2024-10-23 NOTE — TELEPHONE ENCOUNTER
Upon review of the In Basket request we were able to locate, review, and update the patient chart as requested for Medicare AW.    Any additional questions or concerns should be emailed to the Practice Liaisons via the appropriate education email address, please do not reply via In Basket.    Thank you  Louise Franco   [facility]

## 2024-10-23 NOTE — ASSESSMENT & PLAN NOTE
ASA for secondary prevention   And lipitor    Cc and pasted from cardio note 12/2023:     CVA at the end of May 2020,    Event monitor without definitive evidence of Afib (had run of SVT, likely atrial tachycardia) and loop recorder was recommended, but she declined. However, given high suspicion for cardioembolic CVA and Afib, was on eliquis. At last visit, noted that she's now off of it after discussion with Neurology, though Neurology note doesn't reflect that.     Stress test was also recommended given + trop and ECG changes, but wants to hold off. Notes that she feels well. Compliant with her medications. Has been walking daily for exercise. Goes up and down 3 flights of stairs at home without any problems. Has been caring for her ill  who is 89 years old, which is stressful.     She was hospitalized in May 2021 for acute onset of headache nausea and dizziness with associated left hand numbness. She was noted to downplay her symptoms due to her dislike of being in the hospital. SBP up to 199. CT showed right lacunar infarct.

## 2024-10-23 NOTE — ASSESSMENT & PLAN NOTE
Strongly urge hem/onc input  Particularly as pt considering possible MARGOT with pain management  And with personal H/o CVA    Orders:    CBC and differential; Future    JAK2 EXON 12 MUTATION; Future    Ambulatory Referral to Hematology / Oncology; Future

## 2024-10-23 NOTE — ASSESSMENT & PLAN NOTE
Pt requests referral to PT  Also sees dr feldman    Orders:    Ambulatory Referral to Physical Therapy; Future

## 2024-10-23 NOTE — PROGRESS NOTES
Ambulatory Visit  Name: Nilsa Morales      : 2/3/1934      MRN: [id number]  Encounter Provider: Danii Aceves DO  Encounter Date: 10/23/2024   Encounter department: [facility] PRIMARY CARE    Assessment & Plan  History of CVA in adulthood  ASA for secondary prevention   And lipitor    Cc and pasted from cardio note 2023:     CVA at the end of May 2020,    Event monitor without definitive evidence of Afib (had run of SVT, likely atrial tachycardia) and loop recorder was recommended, but she declined. However, given high suspicion for cardioembolic CVA and Afib, was on eliquis. At last visit, noted that she's now off of it after discussion with Neurology, though Neurology note doesn't reflect that.     Stress test was also recommended given + trop and ECG changes, but wants to hold off. Notes that she feels well. Compliant with her medications. Has been walking daily for exercise. Goes up and down 3 flights of stairs at home without any problems. Has been caring for her ill  who is 92 years old, which is stressful.     She was hospitalized in May 2021 for acute onset of headache nausea and dizziness with associated left hand numbness. She was noted to downplay her symptoms due to her dislike of being in the hospital. SBP up to 199. CT showed right lacunar infarct.        Lumbar stenosis with neurogenic claudication  Pt requests referral to PT  Also sees dr feldman    Orders:    Ambulatory Referral to Physical Therapy; Future    Idiopathic thrombocythemia (HCC)  Strongly suggest workup  Will begin with labs and refer to hem/onc    Orders:    JAK2 EXON 12 MUTATION; Future    CBC and differential; Future    Iron, TIBC and Ferritin Panel; Future    Iron deficiency anemia, unspecified iron deficiency anemia type  Repeat labs ordered.       Vitamin B 12 deficiency    Orders:    Vitamin B12; Future    Elevated fasting glucose    Orders:    Comprehensive metabolic panel; Future    Mixed  hyperlipidemia    Orders:    Comprehensive metabolic panel; Future    Lipid panel; Future    Thrombocytosis  Strongly urge hem/onc input  Particularly as pt considering possible MARGOT with pain management  And with personal H/o CVA    Orders:    CBC and differential; Future    JAK2 EXON 12 MUTATION; Future    Ambulatory Referral to Hematology / Oncology; Future    Dermatitis  Unsure etiology  To return for further discussion after seen by hem/onc - as suspect may be related to undx polycythemia vera  To consider bx pending workup with hem/onc    Trial of rx cream  Avoid anti histamines - as do not want to Increase risk of falls.    Orders:    betamethasone dipropionate (DIPROSONE) 0.05 % cream; Apply topically 2 (two) times a day To affected rash for up to 14 days; avoid face/genitals    Medicare annual wellness visit, subsequent  Anticipatory guidance and preventative medicine discussed  Declines flu shot         Essential hypertension  ELEVATED  F/U WITH CARDIO.      Orders:    metoprolol succinate (TOPROL-XL) 25 mg 24 hr tablet; Take 1 tablet (25 mg total) by mouth daily    amLODIPine (NORVASC) 2.5 mg tablet; Take 1 tablet (2.5 mg total) by mouth daily    Weight loss  SUSPECT DUE TO LOW LEVEL OF ACTIVITY AND THEREFORE DECREASED APPETITE  BUT NEW PATIENT TODAY SO WILL NEED TO CONTINUE TO MONITOR.  URGE ADEQUATE NUTRITION  CAN CONSIDER MEALS ON WHEELS.          SHORTTERM F/U IN 3 MOS  SOONER PRN  CAN MONITOR WEIGHT.      Patient Instructions   Referral to PT for back    Refer to hem/onc    Fasting labs    Trial of rx cream for rash            Preventive health issues were discussed with patient, and age appropriate screening tests were ordered as noted in patient's After Visit Summary. Personalized health advice and appropriate referrals for health education or preventive services given if needed, as noted in patient's After Visit Summary.    History of Present Illness     HPI   Patient Care Team:  Danii Aceves   as PCP - General (Family Medicine)  MD Maldonado Valerio DO Daryl Gordon, CRNP Gina Fitzsimmons, DO (Family Medicine)    Review of Systems   Constitutional:  Negative for fever and unexpected weight change.        Lost weight over the past year - about 10 #    Drinking ensure 1-2 cans/day  Still cooking for her and her .     Respiratory:  Negative for cough, shortness of breath and wheezing.    Cardiovascular:  Negative for chest pain and palpitations.        Home bp log  170's to 180's over 80's  Pulse 60-70 range.     Gastrointestinal:  Negative for abdominal pain, blood in stool, constipation, diarrhea, nausea and vomiting.   Genitourinary:  Negative for dysuria.   Musculoskeletal:  Positive for back pain.   Neurological:  Negative for dizziness and syncope.   Hematological:         Denies h/o dvt or PE.     Psychiatric/Behavioral:  Negative for dysphoric mood, self-injury and suicidal ideas. The patient is not nervous/anxious.      Medical History Reviewed by provider this encounter:  Tobacco  Allergies  Meds  Problems  Med Hx  Surg Hx  Fam Hx       Annual Wellness Visit Questionnaire   Nilsa is here for her Subsequent Wellness visit.     Health Risk Assessment:   Patient rates overall health as good. Patient feels that their physical health rating is slightly better. Patient is satisfied with their life. Eyesight was rated as same. Hearing was rated as same. Patient feels that their emotional and mental health rating is same. Patients states they are never, rarely angry. Patient states they are sometimes unusually tired/fatigued. Pain experienced in the last 7 days has been some. Patient's pain rating has been 4/10. Patient states that she has experienced weight loss or gain in last 6 months.     Depression Screening:   PHQ-2 Score: 0  PHQ-9 Score: 0      Fall Risk Screening:   In the past year, patient has experienced: no history of falling in past year      Urinary Incontinence  Screening:   Patient has leaked urine accidently in the last six months. Sneezing     Home Safety:  Patient does not have trouble with stairs inside or outside of their home. Patient has working smoke alarms and has working carbon monoxide detector. Home safety hazards include: none.     Nutrition:   Current diet is Regular, Limited junk food and No Added Salt.     Medications:   Patient is currently taking over-the-counter supplements. OTC medications include: see medication list. Patient is not able to manage medications. Family member helps with meds. Has med box     Activities of Daily Living (ADLs)/Instrumental Activities of Daily Living (IADLs):   Walk and transfer into and out of bed and chair?: Yes  Dress and groom yourself?: Yes    Bathe or shower yourself?: Yes    Feed yourself? Yes  Do your laundry/housekeeping?: No  Manage your money, pay your bills and track your expenses?: Yes  Make your own meals?: Yes    Do your own shopping?: Yes    ADL comments: Get help some times and sometimes alone, not housekeeping only laundry     Previous Hospitalizations:   Any hospitalizations or ED visits within the last 12 months?: No      Advance Care Planning:   Living will: Yes    Durable POA for healthcare: Yes    Advanced directive: Yes      PREVENTIVE SCREENINGS      Cardiovascular Screening:    General: Screening Current      Diabetes Screening:     General: Screening Current      Colorectal Cancer Screening:     General: Screening Not Indicated      Cervical Cancer Screening:    General: Screening Not Indicated      Lung Cancer Screening:     General: Screening Not Indicated    Screening, Brief Intervention, and Referral to Treatment (SBIRT)    Screening  Typical number of drinks in a day: 0  Typical number of drinks in a week: 0  Interpretation: Low risk drinking behavior.    AUDIT-C Screenin) How often did you have a drink containing alcohol in the past year? never  2) How many drinks did you have on a  typical day when you were drinking in the past year? 0  3) How often did you have 6 or more drinks on one occasion in the past year? never    AUDIT-C Score: 0  Interpretation: Score 0-2 (female): Negative screen for alcohol misuse    Single Item Drug Screening:  How often have you used an illegal drug (including marijuana) or a prescription medication for non-medical reasons in the past year? never    Single Item Drug Screen Score: 0  Interpretation: Negative screen for possible drug use disorder    Social Determinants of Health     Financial Resource Strain: Low Risk  (10/17/2023)    [facility]    Overall Financial Resource Strain (CARDIA)     Difficulty of Paying Living Expenses: Not very hard   Food Insecurity: No Food Insecurity (10/23/2024)    Hunger Vital Sign     Worried About Running Out of Food in the Last Year: Never true     Ran Out of Food in the Last Year: Never true   Transportation Needs: No Transportation Needs (10/23/2024)    PRAPARE - Transportation     Lack of Transportation (Medical): No     Lack of Transportation (Non-Medical): No   Housing Stability: Low Risk  (10/23/2024)    Housing Stability Vital Sign     Unable to Pay for Housing in the Last Year: No     Number of Times Moved in the Last Year: 1     Homeless in the Last Year: No   Utilities: Not At Risk (10/23/2024)    [facility] Utilities     Threatened with loss of utilities: No     No results found.    Objective     /72 (BP Location: Left arm, Patient Position: Sitting, Cuff Size: Adult)   Pulse 67   Resp 16   Wt 56.3 kg (124 lb 3.2 oz)   SpO2 96%   BMI 22.72 kg/m²     Physical Exam  Vitals and nursing note reviewed.   Constitutional:       General: She is not in acute distress.     Appearance: Normal appearance. She is not ill-appearing or toxic-appearing.   HENT:      Right Ear: Tympanic membrane normal.      Left Ear: Tympanic membrane normal.      Nose: Nose normal.       Mouth/Throat:      Mouth: Mucous membranes are moist.      Pharynx: No oropharyngeal exudate or posterior oropharyngeal erythema.   Eyes:      General: No scleral icterus.     Extraocular Movements: Extraocular movements intact.      Pupils: Pupils are equal, round, and reactive to light.   Neck:      Vascular: No carotid bruit.   Cardiovascular:      Rate and Rhythm: Normal rate and regular rhythm.      Pulses: Normal pulses.      Heart sounds: Normal heart sounds. No murmur heard.  Pulmonary:      Effort: Pulmonary effort is normal. No respiratory distress.      Breath sounds: Normal breath sounds. No wheezing, rhonchi or rales.   Abdominal:      General: There is no distension.      Palpations: Abdomen is soft. There is no mass.      Tenderness: There is no abdominal tenderness. There is no guarding or rebound.   Musculoskeletal:      Cervical back: Neck supple. No tenderness.      Right lower leg: No edema.      Left lower leg: No edema.   Lymphadenopathy:      Cervical: No cervical adenopathy.   Skin:     General: Skin is warm.      Coloration: Skin is not jaundiced.      Findings: Rash present.      Comments: Macular papular rash - erythematous - anterior chest wall   Neurological:      General: No focal deficit present.      Mental Status: She is alert and oriented to person, place, and time.   Psychiatric:         Mood and Affect: Mood normal.         Behavior: Behavior normal.         Thought Content: Thought content normal.         Judgment: Judgment normal.

## 2024-10-23 NOTE — TELEPHONE ENCOUNTER
----- Message from Naima GARCIA sent at 10/23/2024 10:39 AM EDT -----  Regarding: AWV  10/23/24 10:39 AM    Hello, our patient Nilsa Morales has had Medicare AWV completed/performed. Please assist in updating the patient chart by pulling the Care Everywhere (CE) document. The date of service is 10.19.23     Thank you,  Naima Brooks MA  [facility] PRIMARY [facility]

## 2024-10-23 NOTE — ASSESSMENT & PLAN NOTE
ELEVATED  F/U WITH CARDIO.      Orders:    metoprolol succinate (TOPROL-XL) 25 mg 24 hr tablet; Take 1 tablet (25 mg total) by mouth daily    amLODIPine (NORVASC) 2.5 mg tablet; Take 1 tablet (2.5 mg total) by mouth daily

## 2024-10-24 ENCOUNTER — TELEPHONE (OUTPATIENT)
Age: 89
End: 2024-10-24

## 2024-10-24 NOTE — TELEPHONE ENCOUNTER
Pt called back stated she wasn't able to reach hematology and thought she might have the wrong number.     Warm transferred to hematology for her to schedule appt.

## 2024-10-31 ENCOUNTER — EVALUATION (OUTPATIENT)
Dept: PHYSICAL THERAPY | Facility: MEDICAL CENTER | Age: 89
End: 2024-10-31
Payer: COMMERCIAL

## 2024-10-31 DIAGNOSIS — M48.062 LUMBAR STENOSIS WITH NEUROGENIC CLAUDICATION: ICD-10-CM

## 2024-10-31 PROCEDURE — 97162 PT EVAL MOD COMPLEX 30 MIN: CPT | Performed by: PHYSICAL THERAPIST

## 2024-10-31 NOTE — PROGRESS NOTES
PT Evaluation     Today's date: 10/31/2024  Patient name: Nilsa Morales  : 2/3/1934  MRN: [id number]  Referring provider: Danii Aceves DO  Dx:   Encounter Diagnosis     ICD-10-CM    1. Lumbar stenosis with neurogenic claudication  M48.062 Ambulatory Referral to Physical Therapy                     Assessment/Plan  Patient is a very pleasant 89 yo female who presents with symptoms of ambulation dysfunction as well as general deconditioning.  She has been having lower back pain and radiculopathy on and off for the past several years however this is under control and she is concerned more about her mobility and stability with functional activities.  Patient was found to have weakness throughout her LE and poor balance all of which will be addressed with skilled physical therapy services.  Patient should be seen 2x a week for the next 8 weeks.     Subjective  Patient states that she has been feeling more unsteady over the past couple of months and notices that she is much weaker since she has been doing less.  Her  is not well and she cares for him but notes that she is not able to do as much for him and she is losing her walking tolerance and strength.  Patient would like to be more able to do things outside of her home and would like to walk better with less instability.      Objective  Red Flags: none  Pain: 3/10  Reflexes:     Right Left   Biceps 2+ 2+   Triceps 2+ 2+   Brachioradialis 2+ 2+   Patellar 1+ 1+   Achilles 1+ 1+   Inverted supinator sign neg neg   clonus neg neg   Babinski neg neg   Posture: forward head with rounded shoulders  AROM: WFL  TU sec  Balance: Stokes BT 25  Coordination of Movement/strengthe: LE strength 3+/5 throughout  Goals:  - Pt I with initial HEP in 1-2 visits  - TUG less than 20sec  - Increase Functional Status Measure measured by FOTO by RULA  - safe ambulation in community

## 2024-11-07 ENCOUNTER — TELEPHONE (OUTPATIENT)
Age: 89
End: 2024-11-07

## 2024-11-07 DIAGNOSIS — L30.9 DERMATITIS: Primary | ICD-10-CM

## 2024-11-07 NOTE — TELEPHONE ENCOUNTER
Patient called and stated that she needs a letter from her PCP.  She is having a hard time seeing with the glare from the sun.  They will tint her windows on her car but you need a letter from your PCP to state you need this.  She is wanting her PCP to write a letter.  Pls call and advise either way.    Thank you  [phone]

## 2024-11-11 ENCOUNTER — APPOINTMENT (OUTPATIENT)
Dept: PHYSICAL THERAPY | Facility: MEDICAL CENTER | Age: 89
End: 2024-11-11
Payer: COMMERCIAL

## 2024-11-12 RX ORDER — AMMONIUM LACTATE 12 G/100G
LOTION TOPICAL 2 TIMES DAILY PRN
Qty: 225 ML | Refills: 0 | Status: SHIPPED | OUTPATIENT
Start: 2024-11-12

## 2024-11-12 NOTE — TELEPHONE ENCOUNTER
I spoke with pt today at her husbands visit - and advised her to contact her ophthalmologist about possibly getting a letter  We discussed that other vision problems could be causing the problem - so it is best for them to evaluate.    Pt aware to have blood work prior to seeing hem/onc for thrombocytosis    She brought along cream previously rx for rash and states it is not helping.  Seems to be helped by a hot shower.  Pt showed me a lesion on right buttocks - upper portion - small scab with surrounding erythema and linear streaking from scratching    Recommend avoiding hot showers and use warm water  And trial of lac hydrin for possible neurotic excoriation

## 2024-11-14 ENCOUNTER — OFFICE VISIT (OUTPATIENT)
Dept: PHYSICAL THERAPY | Facility: MEDICAL CENTER | Age: 89
End: 2024-11-14
Payer: COMMERCIAL

## 2024-11-14 DIAGNOSIS — M48.062 LUMBAR STENOSIS WITH NEUROGENIC CLAUDICATION: Primary | ICD-10-CM

## 2024-11-14 PROCEDURE — 97110 THERAPEUTIC EXERCISES: CPT | Performed by: PHYSICAL THERAPIST

## 2024-11-14 NOTE — PROGRESS NOTES
Daily Note     Today's date: 2024  Patient name: Nilsa Morales  : 2/3/1934  MRN: [id number]  Referring provider: Danii Aceves DO  Dx:   Encounter Diagnosis     ICD-10-CM    1. Lumbar stenosis with neurogenic claudication  M48.062                      Subjective: Patient states that the exercises are not working like they were the last time.      Objective: See treatment diary below.      Assessment:  Patient demonstrates good tolerance to tx without pain.  Patient would benefit from continued PT.      Plan: Continue per plan of care.      Precautions: none    Daily Treatment Diary     Manual                                                                                   Exercise Diary              Hip flexor str             Hamstring str 3x10s             Piriformis str 3x10s            LTR 20x5s            SKTC 20x5s            SLR 10x            Sup hip add 2x10 2s            Bridging with ball between knees 2x10            Clamshell with band 2x10 sup green            Standing hip abduction 10x            Standing hip extension 10x                                      Bike 6'                                                                                                           Modalities                           Moist heat dec

## 2024-11-19 ENCOUNTER — OFFICE VISIT (OUTPATIENT)
Dept: PHYSICAL THERAPY | Facility: MEDICAL CENTER | Age: 89
End: 2024-11-19
Payer: COMMERCIAL

## 2024-11-19 DIAGNOSIS — M48.062 LUMBAR STENOSIS WITH NEUROGENIC CLAUDICATION: Primary | ICD-10-CM

## 2024-11-19 PROCEDURE — 97110 THERAPEUTIC EXERCISES: CPT | Performed by: PHYSICAL THERAPIST

## 2024-11-19 NOTE — PROGRESS NOTES
Daily Note     Today's date: 2024  Patient name: Nilsa Morales  : 2/3/1934  MRN: [id number]  Referring provider: Danii Aceves DO  Dx:   Encounter Diagnosis     ICD-10-CM    1. Lumbar stenosis with neurogenic claudication  M48.062                      Subjective: Patient states that she is a little sore today.       Objective: See treatment diary below.      Assessment:  Patient demonstrates good tolerance to tx without pain.  Patient would benefit from continued PT. Progressed with strengthening without issues.        Plan: Continue per plan of care.      Precautions: none    Daily Treatment Diary     Manual                                                                                   Exercise Diary              Hip flexor str             Hamstring str 3x10s             Piriformis str 3x10s            LTR 20x5s            SKTC 20x5s            SLR 10x            Sup hip add 2x10 2s            Bridging with ball between knees 2x10            Clamshell with band 2x10 sup green            Standing hip abduction 10x            Standing hip extension 10x                                      Bike 6'                                                                                                           Modalities                           Moist heat dec

## 2024-11-22 ENCOUNTER — OFFICE VISIT (OUTPATIENT)
Dept: PHYSICAL THERAPY | Facility: MEDICAL CENTER | Age: 89
End: 2024-11-22
Payer: COMMERCIAL

## 2024-11-22 DIAGNOSIS — M48.062 LUMBAR STENOSIS WITH NEUROGENIC CLAUDICATION: Primary | ICD-10-CM

## 2024-11-22 PROCEDURE — 97110 THERAPEUTIC EXERCISES: CPT | Performed by: PHYSICAL THERAPIST

## 2024-11-22 NOTE — PROGRESS NOTES
Daily Note     Today's date: 2024  Patient name: Nilsa Morales  : 2/3/1934  MRN: [id number]  Referring provider: Danii Aceves DO  Dx:   Encounter Diagnosis     ICD-10-CM    1. Lumbar stenosis with neurogenic claudication  M48.062                      Subjective: Patient states that she is doing better and feels like exercise is really going to help her. Difficulty with ambulation and fatigue.       Objective: See treatment diary below.      Assessment:  Patient demonstrates good tolerance to tx without pain.  Patient would benefit from continued PT. Progressed with strengthening without issues.  Patient seems to be more stable and is having less difficulty with ambulation.       Plan: Continue per plan of care.      Precautions: none    Daily Treatment Diary     Manual                                                                                   Exercise Diary              Hip flexor str             Hamstring str 3x10s             Piriformis str 3x10s            LTR 20x5s            SKTC 20x5s            SLR 10x            Sup hip add 2x10 2s            Bridging with ball between knees 2x10            Clamshell with band 2x10 sup green            Standing hip abduction 10x            Standing hip extension 10x                                      Bike 7 min                                                                                                           Modalities                           Moist heat

## 2024-11-25 ENCOUNTER — APPOINTMENT (OUTPATIENT)
Dept: PHYSICAL THERAPY | Facility: MEDICAL CENTER | Age: 89
End: 2024-11-25
Payer: COMMERCIAL

## 2024-11-26 ENCOUNTER — OFFICE VISIT (OUTPATIENT)
Dept: PHYSICAL THERAPY | Facility: MEDICAL CENTER | Age: 89
End: 2024-11-26
Payer: COMMERCIAL

## 2024-11-26 DIAGNOSIS — M48.062 LUMBAR STENOSIS WITH NEUROGENIC CLAUDICATION: Primary | ICD-10-CM

## 2024-11-26 PROCEDURE — 97110 THERAPEUTIC EXERCISES: CPT | Performed by: PHYSICAL THERAPIST

## 2024-11-26 NOTE — PROGRESS NOTES
Daily Note     Today's date: 2024  Patient name: Nilsa Morales  : 2/3/1934  MRN: [id number]  Referring provider: Danii Aceves DO  Dx:   Encounter Diagnosis     ICD-10-CM    1. Lumbar stenosis with neurogenic claudication  M48.062                      Subjective: Patient states that she is doing better and feels like exercise is really going to help her. Difficulty with ambulation and fatigue.       Objective: See treatment diary below.      Assessment:  Patient demonstrates good tolerance to tx without pain.  Patient would benefit from continued PT. Progressed with strengthening without issues.  Patient seems to be more stable and is having less difficulty with ambulation.       Plan: Continue per plan of care.      Precautions: none    Daily Treatment Diary     Manual                                                                                   Exercise Diary              Hip flexor str             Hamstring str 3x10s             Piriformis str 3x10s            LTR 20x5s            SKTC 20x5s            SLR 10x            Sup hip add 2x10 2s            Bridging with ball between knees 2x10            Clamshell with band 2x10 sup green            Standing hip abduction 10x            Standing hip extension 10x                                      Bike 7 min                                                                                                           Modalities                           Moist heat

## 2024-11-29 ENCOUNTER — APPOINTMENT (OUTPATIENT)
Dept: PHYSICAL THERAPY | Facility: MEDICAL CENTER | Age: 89
End: 2024-11-29
Payer: COMMERCIAL

## 2024-12-03 ENCOUNTER — OFFICE VISIT (OUTPATIENT)
Dept: PHYSICAL THERAPY | Facility: MEDICAL CENTER | Age: 89
End: 2024-12-03
Payer: COMMERCIAL

## 2024-12-03 DIAGNOSIS — M48.062 LUMBAR STENOSIS WITH NEUROGENIC CLAUDICATION: Primary | ICD-10-CM

## 2024-12-03 PROCEDURE — 97110 THERAPEUTIC EXERCISES: CPT | Performed by: PHYSICAL THERAPIST

## 2024-12-03 NOTE — PROGRESS NOTES
Daily Note     Today's date: 12/3/2024  Patient name: Nilsa Morales  : 2/3/1934  MRN: [id number]  Referring provider: Danii Aceves DO  Dx:   Encounter Diagnosis     ICD-10-CM    1. Lumbar stenosis with neurogenic claudication  M48.062                      Subjective: Patient states that she is doing better and feels like exercise is really going to help her. Difficulty with ambulation and fatigue.       Objective: See treatment diary below.      Assessment:  Patient demonstrates good tolerance to tx without pain.  Patient would benefit from continued PT. Progressed with strengthening without issues.  Patient seems to be more stable and is having less difficulty with ambulation.       Plan: Continue per plan of care.      Precautions: none    Daily Treatment Diary     Manual                                                                                   Exercise Diary              Hip flexor str             Hamstring str 3x10s             Piriformis str 3x10s            LTR 20x5s            SKTC 20x5s            SLR 10x            Sup hip add 2x10 2s            Bridging with ball between knees 2x10            Clamshell with band 2x10 sup green            Standing hip abduction 10x            Standing hip extension 10x                                      Bike 9 min                                                                                                           Modalities                           Moist heat

## 2024-12-06 ENCOUNTER — OFFICE VISIT (OUTPATIENT)
Dept: PHYSICAL THERAPY | Facility: MEDICAL CENTER | Age: 89
End: 2024-12-06
Payer: COMMERCIAL

## 2024-12-06 DIAGNOSIS — M48.062 LUMBAR STENOSIS WITH NEUROGENIC CLAUDICATION: Primary | ICD-10-CM

## 2024-12-06 PROCEDURE — 97110 THERAPEUTIC EXERCISES: CPT | Performed by: PHYSICAL THERAPIST

## 2024-12-06 NOTE — PROGRESS NOTES
Daily Note     Today's date: 2024  Patient name: Nilsa Morales  : 2/3/1934  MRN: [id number]  Referring provider: Danii Aceves DO  Dx:   Encounter Diagnosis     ICD-10-CM    1. Lumbar stenosis with neurogenic claudication  M48.062                      Subjective: Patient states that she is doing better and feels like exercise is really going to help her. Difficulty with ambulation and fatigue.       Objective: See treatment diary below.      Assessment:  Patient demonstrates good tolerance to tx without pain.  Patient would benefit from continued PT. Progressed with strengthening without issues.  Patient seems to be more stable and is having less difficulty with ambulation.       Plan: Continue per plan of care.      Precautions: none    Daily Treatment Diary     Manual                                                                                   Exercise Diary              Hip flexor str             Hamstring str 3x10s             Piriformis str 3x10s            LTR 20x5s            SKTC 20x5s            SLR 10x            Sup hip add 2x10 2s            Bridging with ball between knees 2x10            Clamshell with band 2x10 sup green            Standing hip abduction 10x            Standing hip extension 10x                                      Bike 9 min                                                                                                           Modalities                           Moist heat

## 2024-12-10 ENCOUNTER — TELEPHONE (OUTPATIENT)
Age: 89
End: 2024-12-10

## 2024-12-10 ENCOUNTER — OFFICE VISIT (OUTPATIENT)
Dept: PHYSICAL THERAPY | Facility: MEDICAL CENTER | Age: 89
End: 2024-12-10
Payer: COMMERCIAL

## 2024-12-10 DIAGNOSIS — M48.062 LUMBAR STENOSIS WITH NEUROGENIC CLAUDICATION: Primary | ICD-10-CM

## 2024-12-10 DIAGNOSIS — M81.0 OSTEOPOROSIS, UNSPECIFIED OSTEOPOROSIS TYPE, UNSPECIFIED PATHOLOGICAL FRACTURE PRESENCE: ICD-10-CM

## 2024-12-10 DIAGNOSIS — R26.2 DISABILITY OF WALKING: ICD-10-CM

## 2024-12-10 DIAGNOSIS — M65.30 TRIGGER FINGER OF RIGHT HAND, UNSPECIFIED FINGER: ICD-10-CM

## 2024-12-10 DIAGNOSIS — M54.16 LUMBAR RADICULOPATHY: Primary | ICD-10-CM

## 2024-12-10 DIAGNOSIS — M48.062 LUMBAR STENOSIS WITH NEUROGENIC CLAUDICATION: ICD-10-CM

## 2024-12-10 DIAGNOSIS — M51.26 HERNIATION OF LUMBAR INTERVERTEBRAL DISC WITHOUT MYELOPATHY: ICD-10-CM

## 2024-12-10 PROCEDURE — 97110 THERAPEUTIC EXERCISES: CPT

## 2024-12-10 NOTE — TELEPHONE ENCOUNTER
Bernard from PT called to request referral for R hand trigger finger. Pt is being seen for her back but would also like to have PT for hand as well. Please advise, thank you.

## 2024-12-10 NOTE — PROGRESS NOTES
Daily Note     Today's date: 12/10/2024  Patient name: Nilsa Morales  : 2/3/1934  MRN: [id number]  Referring provider: Danii Aceves DO  Dx:   Encounter Diagnosis     ICD-10-CM    1. Lumbar stenosis with neurogenic claudication  M48.062                      Subjective: Pt reports that she feels tired quite often.      Objective: See treatment diary below      Assessment: Tolerated treatment well. Patient demonstrated fatigue post treatment, exhibited good technique with therapeutic exercises, and would benefit from continued PT  Pt is responding well to current tx plan.       Plan: Continue per plan of care.      Precautions: none    Daily Treatment Diary     Manual                                                                                   Exercise Diary              Hip flexor str             Hamstring str 3x10s             Piriformis str 3x10s            LTR 20x5s            SKTC 20x5s            SLR 10x            Sup hip add 2x10 2s            Bridging with ball between knees 2x10            Clamshell with band 2x10 sup   black            Standing hip abduction 10x2            Standing hip extension 10x2                                      Bike 10 min                                                                                                           Modalities                           Moist heat

## 2024-12-18 ENCOUNTER — APPOINTMENT (OUTPATIENT)
Dept: PHYSICAL THERAPY | Facility: MEDICAL CENTER | Age: 89
End: 2024-12-18
Payer: COMMERCIAL

## 2024-12-19 ENCOUNTER — OFFICE VISIT (OUTPATIENT)
Dept: PHYSICAL THERAPY | Facility: MEDICAL CENTER | Age: 89
End: 2024-12-19
Payer: COMMERCIAL

## 2024-12-19 DIAGNOSIS — M48.062 LUMBAR STENOSIS WITH NEUROGENIC CLAUDICATION: Primary | ICD-10-CM

## 2024-12-19 PROCEDURE — 97110 THERAPEUTIC EXERCISES: CPT | Performed by: PHYSICAL THERAPIST

## 2024-12-19 NOTE — PROGRESS NOTES
Daily Note     Today's date: 2024  Patient name: Nilsa Morales  : 2/3/1934  MRN: [id number]  Referring provider: Danii Aceves DO  Dx:   Encounter Diagnosis     ICD-10-CM    1. Lumbar stenosis with neurogenic claudication  M48.062                      Subjective: Pt reports that she feels tired quite often.      Objective: See treatment diary below      Assessment: Tolerated treatment well. Patient demonstrated fatigue post treatment, exhibited good technique with therapeutic exercises, and would benefit from continued PT  Pt is responding well to current tx plan.       Plan: Continue per plan of care.      Precautions: none    Daily Treatment Diary     Manual                                                                                   Exercise Diary              Hip flexor str             Hamstring str 3x10s             Piriformis str 3x10s            LTR 20x5s            SKTC 20x5s            SLR 10x            Sup hip add 2x10 2s            Bridging with ball between knees 2x10            Clamshell with band 2x10 sup   black            Standing hip abduction 10x2            Standing hip extension 10x2                                      Bike 10 min                                                                                                           Modalities                           Moist heat

## 2024-12-24 ENCOUNTER — OFFICE VISIT (OUTPATIENT)
Dept: PHYSICAL THERAPY | Facility: MEDICAL CENTER | Age: 89
End: 2024-12-24
Payer: COMMERCIAL

## 2024-12-24 DIAGNOSIS — M48.062 LUMBAR STENOSIS WITH NEUROGENIC CLAUDICATION: Primary | ICD-10-CM

## 2024-12-24 PROCEDURE — 97110 THERAPEUTIC EXERCISES: CPT

## 2024-12-24 NOTE — PROGRESS NOTES
Daily Note     Today's date: 2024  Patient name: Nilsa Morales  : 2/3/1934  MRN: [id number]  Referring provider: Danii Aceves DO  Dx:   Encounter Diagnosis     ICD-10-CM    1. Lumbar stenosis with neurogenic claudication  M48.062                      Subjective: Pt reports that her arthritis has been worse the past few days.       Objective: See treatment diary below      Assessment: Tolerated treatment well. Patient exhibited good technique with therapeutic exercises and would benefit from continued PT  Pt is responding well to current tx plan.       Plan: Continue per plan of care.      Precautions: none    Daily Treatment Diary     Manual                                                                                   Exercise Diary              Hip flexor str             Hamstring str 3x10s             Piriformis str 3x10s            LTR 20x5s            SKTC 20x5s            SLR 10x            Sup hip add 2x10 2s            Bridging with ball between knees 2x10            Clamshell with band 2x10 sup   black            Standing hip abduction 10x2            Standing hip extension 10x2                                      Bike 10 min                                                                                                           Modalities                           Moist heat

## 2025-01-01 ENCOUNTER — APPOINTMENT (INPATIENT)
Dept: RADIOLOGY | Facility: HOSPITAL | Age: OVER 89
DRG: 208 | End: 2025-01-01
Payer: COMMERCIAL

## 2025-01-01 ENCOUNTER — HOSPITAL ENCOUNTER (INPATIENT)
Facility: HOSPITAL | Age: OVER 89
LOS: 4 days | DRG: 208 | End: 2025-01-13
Attending: EMERGENCY MEDICINE
Payer: COMMERCIAL

## 2025-01-01 ENCOUNTER — APPOINTMENT (OUTPATIENT)
Dept: PHYSICAL THERAPY | Facility: MEDICAL CENTER | Age: OVER 89
End: 2025-01-01
Payer: COMMERCIAL

## 2025-01-01 ENCOUNTER — RESULTS FOLLOW-UP (OUTPATIENT)
Dept: FAMILY MEDICINE CLINIC | Facility: CLINIC | Age: OVER 89
End: 2025-01-01

## 2025-01-01 ENCOUNTER — APPOINTMENT (EMERGENCY)
Dept: RADIOLOGY | Facility: HOSPITAL | Age: OVER 89
DRG: 208 | End: 2025-01-01
Payer: COMMERCIAL

## 2025-01-01 ENCOUNTER — APPOINTMENT (INPATIENT)
Dept: NON INVASIVE DIAGNOSTICS | Facility: HOSPITAL | Age: OVER 89
DRG: 208 | End: 2025-01-01
Payer: COMMERCIAL

## 2025-01-01 VITALS
WEIGHT: 130.07 LBS | HEART RATE: 101 BPM | OXYGEN SATURATION: 50 % | RESPIRATION RATE: 19 BRPM | BODY MASS INDEX: 23.94 KG/M2 | DIASTOLIC BLOOD PRESSURE: 40 MMHG | HEIGHT: 62 IN | SYSTOLIC BLOOD PRESSURE: 78 MMHG | TEMPERATURE: 99.3 F

## 2025-01-01 DIAGNOSIS — E53.8 VITAMIN B 12 DEFICIENCY: ICD-10-CM

## 2025-01-01 DIAGNOSIS — R06.00 DYSPNEA: ICD-10-CM

## 2025-01-01 DIAGNOSIS — J96.01 ACUTE RESPIRATORY FAILURE WITH HYPOXIA (HCC): Primary | ICD-10-CM

## 2025-01-01 DIAGNOSIS — Z86.79 HISTORY OF PAROXYSMAL ATRIAL TACHYCARDIA: ICD-10-CM

## 2025-01-01 DIAGNOSIS — I10 ELEVATED BLOOD PRESSURE READING WITH DIAGNOSIS OF HYPERTENSION: ICD-10-CM

## 2025-01-01 DIAGNOSIS — N18.30 STAGE 3 CHRONIC KIDNEY DISEASE, UNSPECIFIED WHETHER STAGE 3A OR 3B CKD (HCC): ICD-10-CM

## 2025-01-01 DIAGNOSIS — J81.1 PULMONARY EDEMA: ICD-10-CM

## 2025-01-01 DIAGNOSIS — R60.0 BILATERAL LEG EDEMA: ICD-10-CM

## 2025-01-01 DIAGNOSIS — Z86.73 HISTORY OF STROKE: ICD-10-CM

## 2025-01-01 DIAGNOSIS — I31.39 PERICARDIAL EFFUSION: ICD-10-CM

## 2025-01-01 DIAGNOSIS — I50.33 ACUTE ON CHRONIC DIASTOLIC (CONGESTIVE) HEART FAILURE (HCC): ICD-10-CM

## 2025-01-01 DIAGNOSIS — D47.3 IDIOPATHIC THROMBOCYTHEMIA (HCC): ICD-10-CM

## 2025-01-01 LAB
2HR DELTA HS TROPONIN: 4 NG/L
4HR DELTA HS TROPONIN: 16 NG/L
ALBUMIN SERPL BCG-MCNC: 2.8 G/DL (ref 3.5–5)
ALBUMIN SERPL BCG-MCNC: 3.7 G/DL (ref 3.5–5)
ALBUMIN SERPL BCG-MCNC: 4 G/DL (ref 3.5–5)
ALBUMIN SERPL BCG-MCNC: 4.1 G/DL (ref 3.5–5)
ALBUMIN SERPL ELPH-MCNC: 3.3 G/DL (ref 3.2–5.1)
ALBUMIN SERPL ELPH-MCNC: 61.1 % (ref 48–70)
ALP SERPL-CCNC: 61 U/L (ref 34–104)
ALP SERPL-CCNC: 66 U/L (ref 34–104)
ALP SERPL-CCNC: 69 U/L (ref 34–104)
ALP SERPL-CCNC: 70 U/L (ref 34–104)
ALPHA1 GLOB SERPL ELPH-MCNC: 0.27 G/DL (ref 0.15–0.47)
ALPHA1 GLOB SERPL ELPH-MCNC: 5 % (ref 1.8–7)
ALPHA2 GLOB SERPL ELPH-MCNC: 0.72 G/DL (ref 0.42–1.04)
ALPHA2 GLOB SERPL ELPH-MCNC: 13.4 % (ref 5.9–14.9)
ALT SERPL W P-5'-P-CCNC: 14 U/L (ref 7–52)
ALT SERPL W P-5'-P-CCNC: 17 U/L (ref 7–52)
ALT SERPL W P-5'-P-CCNC: 28 U/L (ref 7–52)
ALT SERPL W P-5'-P-CCNC: 29 U/L (ref 7–52)
ANION GAP SERPL CALCULATED.3IONS-SCNC: 8 MMOL/L (ref 4–13)
ANION GAP SERPL CALCULATED.3IONS-SCNC: 8 MMOL/L (ref 4–13)
ANION GAP SERPL CALCULATED.3IONS-SCNC: 9 MMOL/L (ref 4–13)
AORTIC ROOT: 2.9 CM
AORTIC VALVE MEAN VELOCITY: 13.7 M/S
ARTERIAL PATENCY WRIST A: YES
ASCENDING AORTA: 2.8 CM
AST SERPL W P-5'-P-CCNC: 23 U/L (ref 13–39)
AST SERPL W P-5'-P-CCNC: 24 U/L (ref 13–39)
AST SERPL W P-5'-P-CCNC: 33 U/L (ref 13–39)
AST SERPL W P-5'-P-CCNC: 50 U/L (ref 13–39)
ATRIAL RATE: 101 BPM
ATRIAL RATE: 86 BPM
AV AREA BY CONTINUOUS VTI: 2.4 CM2
AV AREA PEAK VELOCITY: 2.6 CM2
AV LVOT MEAN GRADIENT: 5 MMHG
AV LVOT PEAK GRADIENT: 8 MMHG
AV MEAN PRESS GRAD SYS DOP V1V2: 8 MMHG
AV ORIFICE AREA US: 2.42 CM2
AV PEAK GRADIENT: 14 MMHG
AV VELOCITY RATIO: 0.76
AV VMAX SYS DOP: 1.85 M/S
B PARAP IS1001 DNA NPH QL NAA+NON-PROBE: NOT DETECTED
B PERT.PT PRMT NPH QL NAA+NON-PROBE: NOT DETECTED
BASE EX.OXY STD BLDV CALC-SCNC: 89.5 % (ref 60–80)
BASE EXCESS BLDA CALC-SCNC: -2.3 MMOL/L
BASE EXCESS BLDA CALC-SCNC: -4.3 MMOL/L
BASE EXCESS BLDA CALC-SCNC: 0.1 MMOL/L
BASE EXCESS BLDV CALC-SCNC: -0.4 MMOL/L
BASOPHILS # BLD AUTO: 0.02 THOUSANDS/ΜL (ref 0–0.1)
BASOPHILS # BLD AUTO: 0.05 THOUSANDS/ΜL (ref 0–0.1)
BASOPHILS # BLD AUTO: 0.05 THOUSANDS/ΜL (ref 0–0.1)
BASOPHILS # BLD AUTO: 0.1 THOUSANDS/ΜL (ref 0–0.1)
BASOPHILS NFR BLD AUTO: 0 % (ref 0–1)
BASOPHILS NFR BLD AUTO: 1 % (ref 0–1)
BETA GLOB ABNORMAL SERPL ELPH-MCNC: 0.31 G/DL (ref 0.31–0.57)
BETA1 GLOB SERPL ELPH-MCNC: 5.7 % (ref 4.7–7.7)
BETA2 GLOB SERPL ELPH-MCNC: 4.7 % (ref 3.1–7.9)
BETA2+GAMMA GLOB SERPL ELPH-MCNC: 0.25 G/DL (ref 0.2–0.58)
BILIRUB SERPL-MCNC: 0.27 MG/DL (ref 0.2–1)
BILIRUB SERPL-MCNC: 0.29 MG/DL (ref 0.2–1)
BILIRUB SERPL-MCNC: 0.33 MG/DL (ref 0.2–1)
BILIRUB SERPL-MCNC: 0.46 MG/DL (ref 0.2–1)
BNP SERPL-MCNC: 545 PG/ML (ref 0–100)
BSA FOR ECHO PROCEDURE: 1.62 M2
BUN SERPL-MCNC: 26 MG/DL (ref 5–25)
BUN SERPL-MCNC: 27 MG/DL (ref 5–25)
BUN SERPL-MCNC: 27 MG/DL (ref 5–25)
BUN SERPL-MCNC: 32 MG/DL (ref 5–25)
BUN SERPL-MCNC: 45 MG/DL (ref 5–25)
BUN SERPL-MCNC: 54 MG/DL (ref 5–25)
C PNEUM DNA NPH QL NAA+NON-PROBE: NOT DETECTED
CALCIUM ALBUM COR SERPL-MCNC: 8.5 MG/DL (ref 8.3–10.1)
CALCIUM SERPL-MCNC: 7.5 MG/DL (ref 8.4–10.2)
CALCIUM SERPL-MCNC: 7.6 MG/DL (ref 8.4–10.2)
CALCIUM SERPL-MCNC: 8.5 MG/DL (ref 8.4–10.2)
CALCIUM SERPL-MCNC: 8.6 MG/DL (ref 8.4–10.2)
CALCIUM SERPL-MCNC: 8.6 MG/DL (ref 8.4–10.2)
CALCIUM SERPL-MCNC: 8.7 MG/DL (ref 8.4–10.2)
CARDIAC TROPONIN I PNL SERPL HS: 20 NG/L (ref ?–50)
CARDIAC TROPONIN I PNL SERPL HS: 284 NG/L (ref 8–18)
CARDIAC TROPONIN I PNL SERPL HS: 4 NG/L (ref ?–50)
CARDIAC TROPONIN I PNL SERPL HS: 8 NG/L (ref ?–50)
CHLORIDE SERPL-SCNC: 101 MMOL/L (ref 96–108)
CHLORIDE SERPL-SCNC: 101 MMOL/L (ref 96–108)
CHLORIDE SERPL-SCNC: 102 MMOL/L (ref 96–108)
CHLORIDE SERPL-SCNC: 104 MMOL/L (ref 96–108)
CHLORIDE SERPL-SCNC: 104 MMOL/L (ref 96–108)
CHLORIDE SERPL-SCNC: 105 MMOL/L (ref 96–108)
CO2 SERPL-SCNC: 25 MMOL/L (ref 21–32)
CO2 SERPL-SCNC: 26 MMOL/L (ref 21–32)
CO2 SERPL-SCNC: 27 MMOL/L (ref 21–32)
CO2 SERPL-SCNC: 27 MMOL/L (ref 21–32)
CO2 SERPL-SCNC: 28 MMOL/L (ref 21–32)
CO2 SERPL-SCNC: 28 MMOL/L (ref 21–32)
CREAT SERPL-MCNC: 1.13 MG/DL (ref 0.6–1.3)
CREAT SERPL-MCNC: 1.31 MG/DL (ref 0.6–1.3)
CREAT SERPL-MCNC: 1.48 MG/DL (ref 0.6–1.3)
CREAT SERPL-MCNC: 1.69 MG/DL (ref 0.6–1.3)
CREAT SERPL-MCNC: 2.17 MG/DL (ref 0.6–1.3)
CREAT SERPL-MCNC: 2.83 MG/DL (ref 0.6–1.3)
D DIMER PPP FEU-MCNC: 0.9 UG/ML FEU
DOP CALC AO VTI: 43.84 CM
DOP CALC LVOT AREA: 3.46 CM2
DOP CALC LVOT CARDIAC INDEX: 5.56 L/MIN/M2
DOP CALC LVOT CARDIAC OUTPUT: 9.01 L/MIN
DOP CALC LVOT DIAMETER: 2.1 CM
DOP CALC LVOT PEAK VEL VTI: 30.59 CM
DOP CALC LVOT PEAK VEL: 1.4 M/S
DOP CALC LVOT STROKE INDEX: 66 ML/M2
DOP CALC LVOT STROKE VOLUME: 105.9
DOP CALC MV VTI: 42.6 CM
EOSINOPHIL # BLD AUTO: 0 THOUSAND/ΜL (ref 0–0.61)
EOSINOPHIL # BLD AUTO: 0.01 THOUSAND/ΜL (ref 0–0.61)
EOSINOPHIL # BLD AUTO: 0.02 THOUSAND/ΜL (ref 0–0.61)
EOSINOPHIL # BLD AUTO: 0.33 THOUSAND/ΜL (ref 0–0.61)
EOSINOPHIL NFR BLD AUTO: 0 % (ref 0–6)
EOSINOPHIL NFR BLD AUTO: 2 % (ref 0–6)
ERYTHROCYTE [DISTWIDTH] IN BLOOD BY AUTOMATED COUNT: 16.9 % (ref 11.6–15.1)
ERYTHROCYTE [DISTWIDTH] IN BLOOD BY AUTOMATED COUNT: 17.1 % (ref 11.6–15.1)
ERYTHROCYTE [DISTWIDTH] IN BLOOD BY AUTOMATED COUNT: 17.2 % (ref 11.6–15.1)
ERYTHROCYTE [DISTWIDTH] IN BLOOD BY AUTOMATED COUNT: 17.3 % (ref 11.6–15.1)
ERYTHROCYTE [DISTWIDTH] IN BLOOD BY AUTOMATED COUNT: 17.4 % (ref 11.6–15.1)
ERYTHROCYTE [DISTWIDTH] IN BLOOD BY AUTOMATED COUNT: 17.4 % (ref 11.6–15.1)
FERRITIN SERPL-MCNC: 218 NG/ML (ref 11–307)
FLUAV AG UPPER RESP QL IA.RAPID: NEGATIVE
FLUAV H1 2009 PAN RNA NPH NAA+NON-PROBE: DETECTED
FLUBV AG UPPER RESP QL IA.RAPID: NEGATIVE
FLUBV RNA NPH QL NAA+NON-PROBE: NOT DETECTED
FOLATE SERPL-MCNC: >22.3 NG/ML
FRACTIONAL SHORTENING: 37 (ref 28–44)
GAMMA GLOB ABNORMAL SERPL ELPH-MCNC: 0.55 G/DL (ref 0.4–1.66)
GAMMA GLOB SERPL ELPH-MCNC: 10.1 % (ref 6.9–22.3)
GFR SERPL CREATININE-BSD FRML MDRD: 14 ML/MIN/1.73SQ M
GFR SERPL CREATININE-BSD FRML MDRD: 19 ML/MIN/1.73SQ M
GFR SERPL CREATININE-BSD FRML MDRD: 26 ML/MIN/1.73SQ M
GFR SERPL CREATININE-BSD FRML MDRD: 30 ML/MIN/1.73SQ M
GFR SERPL CREATININE-BSD FRML MDRD: 35 ML/MIN/1.73SQ M
GFR SERPL CREATININE-BSD FRML MDRD: 42 ML/MIN/1.73SQ M
GLUCOSE SERPL-MCNC: 116 MG/DL (ref 65–140)
GLUCOSE SERPL-MCNC: 118 MG/DL (ref 65–140)
GLUCOSE SERPL-MCNC: 124 MG/DL (ref 65–140)
GLUCOSE SERPL-MCNC: 129 MG/DL (ref 65–140)
GLUCOSE SERPL-MCNC: 132 MG/DL (ref 65–140)
GLUCOSE SERPL-MCNC: 177 MG/DL (ref 65–140)
HADV DNA NPH QL NAA+NON-PROBE: NOT DETECTED
HCO3 BLDA-SCNC: 23.3 MMOL/L (ref 22–28)
HCO3 BLDA-SCNC: 24.7 MMOL/L (ref 22–28)
HCO3 BLDA-SCNC: 24.8 MMOL/L (ref 22–28)
HCO3 BLDV-SCNC: 25.4 MMOL/L (ref 24–30)
HCOV 229E RNA NPH QL NAA+NON-PROBE: NOT DETECTED
HCOV HKU1 RNA NPH QL NAA+NON-PROBE: NOT DETECTED
HCOV NL63 RNA NPH QL NAA+NON-PROBE: NOT DETECTED
HCOV OC43 RNA NPH QL NAA+NON-PROBE: NOT DETECTED
HCT VFR BLD AUTO: 23.4 % (ref 34.8–46.1)
HCT VFR BLD AUTO: 24.1 % (ref 34.8–46.1)
HCT VFR BLD AUTO: 25.7 % (ref 34.8–46.1)
HCT VFR BLD AUTO: 27.1 % (ref 34.8–46.1)
HCT VFR BLD AUTO: 28.3 % (ref 34.8–46.1)
HCT VFR BLD AUTO: 28.4 % (ref 34.8–46.1)
HGB BLD-MCNC: 7.4 G/DL (ref 11.5–15.4)
HGB BLD-MCNC: 7.7 G/DL (ref 11.5–15.4)
HGB BLD-MCNC: 7.7 G/DL (ref 11.5–15.4)
HGB BLD-MCNC: 8.2 G/DL (ref 11.5–15.4)
HGB BLD-MCNC: 8.8 G/DL (ref 11.5–15.4)
HGB BLD-MCNC: 9 G/DL (ref 11.5–15.4)
HGB BLD-MCNC: 9 G/DL (ref 11.5–15.4)
HMPV RNA NPH QL NAA+NON-PROBE: NOT DETECTED
HOROWITZ INDEX BLDA+IHG-RTO: 100 MM[HG]
HPIV1 RNA NPH QL NAA+NON-PROBE: NOT DETECTED
HPIV2 RNA NPH QL NAA+NON-PROBE: NOT DETECTED
HPIV3 RNA NPH QL NAA+NON-PROBE: NOT DETECTED
HPIV4 RNA NPH QL NAA+NON-PROBE: NOT DETECTED
I-TIME: 1
IGG/ALB SER: 1.57 {RATIO} (ref 1.1–1.8)
IMM GRANULOCYTES # BLD AUTO: 0.06 THOUSAND/UL (ref 0–0.2)
IMM GRANULOCYTES # BLD AUTO: 0.06 THOUSAND/UL (ref 0–0.2)
IMM GRANULOCYTES # BLD AUTO: 0.07 THOUSAND/UL (ref 0–0.2)
IMM GRANULOCYTES # BLD AUTO: 0.08 THOUSAND/UL (ref 0–0.2)
IMM GRANULOCYTES NFR BLD AUTO: 0 % (ref 0–2)
IMM GRANULOCYTES NFR BLD AUTO: 0 % (ref 0–2)
IMM GRANULOCYTES NFR BLD AUTO: 1 % (ref 0–2)
IMM GRANULOCYTES NFR BLD AUTO: 1 % (ref 0–2)
INTERPRETATION UR IFE-IMP: NORMAL
INTERVENTRICULAR SEPTUM IN DIASTOLE (PARASTERNAL SHORT AXIS VIEW): 1.5 CM
INTERVENTRICULAR SEPTUM: 1.5 CM (ref 0.6–1.1)
IPAP: 12
IPAP: 12
IRON SERPL-MCNC: <10 UG/DL (ref 50–212)
KAPPA LC FREE SER-MCNC: 23.7 MG/L (ref 3.3–19.4)
KAPPA LC FREE/LAMBDA FREE SER: 1.24 {RATIO} (ref 0.26–1.65)
L PNEUMO1 AG UR QL IA.RAPID: NEGATIVE
LAAS-AP2: 29.1 CM2
LAAS-AP4: 22.9 CM2
LACTATE SERPL-SCNC: 0.9 MMOL/L (ref 0.5–2)
LACTATE SERPL-SCNC: 1.8 MMOL/L (ref 0.5–2)
LAMBDA LC FREE SERPL-MCNC: 19.1 MG/L (ref 5.7–26.3)
LEFT ATRIUM SIZE: 4.9 CM
LEFT ATRIUM VOLUME (MOD BIPLANE): 89 ML
LEFT ATRIUM VOLUME INDEX (MOD BIPLANE): 54.9 ML/M2
LEFT INTERNAL DIMENSION IN SYSTOLE: 2.4 CM (ref 2.1–4)
LEFT VENTRICULAR INTERNAL DIMENSION IN DIASTOLE: 3.8 CM (ref 3.5–6)
LEFT VENTRICULAR POSTERIOR WALL IN END DIASTOLE: 1.6 CM
LEFT VENTRICULAR STROKE VOLUME: 41 ML
LV EF US.2D.A4C+ESTIMATED: 60 %
LVSV (TEICH): 41 ML
LYMPHOCYTES # BLD AUTO: 0.76 THOUSANDS/ΜL (ref 0.6–4.47)
LYMPHOCYTES # BLD AUTO: 0.86 THOUSANDS/ΜL (ref 0.6–4.47)
LYMPHOCYTES # BLD AUTO: 1.25 THOUSANDS/ΜL (ref 0.6–4.47)
LYMPHOCYTES # BLD AUTO: 1.68 THOUSANDS/ΜL (ref 0.6–4.47)
LYMPHOCYTES NFR BLD AUTO: 13 % (ref 14–44)
LYMPHOCYTES NFR BLD AUTO: 6 % (ref 14–44)
LYMPHOCYTES NFR BLD AUTO: 7 % (ref 14–44)
LYMPHOCYTES NFR BLD AUTO: 8 % (ref 14–44)
M PNEUMO DNA NPH QL NAA+NON-PROBE: NOT DETECTED
MAGNESIUM SERPL-MCNC: 2.2 MG/DL (ref 1.9–2.7)
MAGNESIUM SERPL-MCNC: 2.3 MG/DL (ref 1.9–2.7)
MCH RBC QN AUTO: 29 PG (ref 26.8–34.3)
MCH RBC QN AUTO: 29.2 PG (ref 26.8–34.3)
MCH RBC QN AUTO: 29.3 PG (ref 26.8–34.3)
MCH RBC QN AUTO: 29.7 PG (ref 26.8–34.3)
MCH RBC QN AUTO: 29.8 PG (ref 26.8–34.3)
MCH RBC QN AUTO: 30 PG (ref 26.8–34.3)
MCHC RBC AUTO-ENTMCNC: 31.6 G/DL (ref 31.4–37.4)
MCHC RBC AUTO-ENTMCNC: 31.7 G/DL (ref 31.4–37.4)
MCHC RBC AUTO-ENTMCNC: 31.8 G/DL (ref 31.4–37.4)
MCHC RBC AUTO-ENTMCNC: 31.9 G/DL (ref 31.4–37.4)
MCHC RBC AUTO-ENTMCNC: 32 G/DL (ref 31.4–37.4)
MCHC RBC AUTO-ENTMCNC: 32.5 G/DL (ref 31.4–37.4)
MCV RBC AUTO: 92 FL (ref 82–98)
MCV RBC AUTO: 93 FL (ref 82–98)
MCV RBC AUTO: 94 FL (ref 82–98)
MONOCYTES # BLD AUTO: 0.97 THOUSAND/ΜL (ref 0.17–1.22)
MONOCYTES # BLD AUTO: 1 THOUSAND/ΜL (ref 0.17–1.22)
MONOCYTES # BLD AUTO: 1.04 THOUSAND/ΜL (ref 0.17–1.22)
MONOCYTES # BLD AUTO: 1.57 THOUSAND/ΜL (ref 0.17–1.22)
MONOCYTES NFR BLD AUTO: 10 % (ref 4–12)
MONOCYTES NFR BLD AUTO: 7 % (ref 4–12)
MONOCYTES NFR BLD AUTO: 8 % (ref 4–12)
MONOCYTES NFR BLD AUTO: 8 % (ref 4–12)
MRSA NOSE QL CULT: NORMAL
MV MEAN GRADIENT: 2 MMHG
MV PEAK GRADIENT: 7 MMHG
MV VALVE AREA BY CONTINUITY EQUATION: 2.49 CM2
NEUTROPHILS # BLD AUTO: 10.28 THOUSANDS/ΜL (ref 1.85–7.62)
NEUTROPHILS # BLD AUTO: 12.21 THOUSANDS/ΜL (ref 1.85–7.62)
NEUTROPHILS # BLD AUTO: 13.09 THOUSANDS/ΜL (ref 1.85–7.62)
NEUTROPHILS # BLD AUTO: 9.8 THOUSANDS/ΜL (ref 1.85–7.62)
NEUTS SEG NFR BLD AUTO: 76 % (ref 43–75)
NEUTS SEG NFR BLD AUTO: 82 % (ref 43–75)
NEUTS SEG NFR BLD AUTO: 84 % (ref 43–75)
NEUTS SEG NFR BLD AUTO: 86 % (ref 43–75)
NON VENT- BIPAP: ABNORMAL
NON VENT- BIPAP: ABNORMAL
NRBC BLD AUTO-RTO: 0 /100 WBCS
O2 CT BLDA-SCNC: 11.4 ML/DL (ref 16–23)
O2 CT BLDA-SCNC: 12.6 ML/DL (ref 16–23)
O2 CT BLDA-SCNC: 13.6 ML/DL (ref 16–23)
O2 CT BLDV-SCNC: 12.4 ML/DL
OXYHGB MFR BLDA: 91.1 % (ref 94–97)
OXYHGB MFR BLDA: 97.6 % (ref 94–97)
OXYHGB MFR BLDA: 98.2 % (ref 94–97)
P AXIS: 71 DEGREES
P AXIS: 76 DEGREES
PCO2 BLDA: 40.1 MM HG (ref 36–44)
PCO2 BLDA: 43.4 MM HG (ref 36–44)
PCO2 BLDA: 69.9 MM HG (ref 36–44)
PCO2 BLDV: 47.2 MM HG (ref 42–50)
PEEP MAX SETTING VENT: 6 CM[H2O]
PEEP MAX SETTING VENT: 6 CM[H2O]
PEEP RESPIRATORY: 6 CM[H2O]
PH BLDA: 7.17 [PH] (ref 7.35–7.45)
PH BLDA: 7.35 [PH] (ref 7.35–7.45)
PH BLDA: 7.41 [PH] (ref 7.35–7.45)
PH BLDV: 7.35 [PH] (ref 7.3–7.4)
PHOSPHATE SERPL-MCNC: 4.7 MG/DL (ref 2.3–4.1)
PHOSPHATE SERPL-MCNC: 5.7 MG/DL (ref 2.3–4.1)
PLATELET # BLD AUTO: 462 THOUSANDS/UL (ref 149–390)
PLATELET # BLD AUTO: 474 THOUSANDS/UL (ref 149–390)
PLATELET # BLD AUTO: 586 THOUSANDS/UL (ref 149–390)
PLATELET # BLD AUTO: 673 THOUSANDS/UL (ref 149–390)
PLATELET # BLD AUTO: 703 THOUSANDS/UL (ref 149–390)
PLATELET # BLD AUTO: 748 THOUSANDS/UL (ref 149–390)
PLATELET # BLD AUTO: 839 THOUSANDS/UL (ref 149–390)
PMV BLD AUTO: 10.2 FL (ref 8.9–12.7)
PMV BLD AUTO: 10.2 FL (ref 8.9–12.7)
PMV BLD AUTO: 10.4 FL (ref 8.9–12.7)
PMV BLD AUTO: 10.6 FL (ref 8.9–12.7)
PMV BLD AUTO: 10.8 FL (ref 8.9–12.7)
PMV BLD AUTO: 11.3 FL (ref 8.9–12.7)
PMV BLD AUTO: 9.9 FL (ref 8.9–12.7)
PO2 BLDA: 137.1 MM HG (ref 75–129)
PO2 BLDA: 292.5 MM HG (ref 75–129)
PO2 BLDA: 64.6 MM HG (ref 75–129)
PO2 BLDV: 61.2 MM HG (ref 35–45)
POTASSIUM SERPL-SCNC: 3.7 MMOL/L (ref 3.5–5.3)
POTASSIUM SERPL-SCNC: 3.7 MMOL/L (ref 3.5–5.3)
POTASSIUM SERPL-SCNC: 4 MMOL/L (ref 3.5–5.3)
POTASSIUM SERPL-SCNC: 4.1 MMOL/L (ref 3.5–5.3)
POTASSIUM SERPL-SCNC: 4.2 MMOL/L (ref 3.5–5.3)
POTASSIUM SERPL-SCNC: 4.4 MMOL/L (ref 3.5–5.3)
PR INTERVAL: 208 MS
PR INTERVAL: 224 MS
PROCALCITONIN SERPL-MCNC: 0.19 NG/ML
PROCALCITONIN SERPL-MCNC: 26.73 NG/ML
PROCALCITONIN SERPL-MCNC: 39.83 NG/ML
PROT PATTERN SERPL ELPH-IMP: ABNORMAL
PROT SERPL-MCNC: 5.3 G/DL (ref 6.4–8.4)
PROT SERPL-MCNC: 5.4 G/DL (ref 6.4–8.2)
PROT SERPL-MCNC: 6.1 G/DL (ref 6.4–8.4)
PROT SERPL-MCNC: 6.4 G/DL (ref 6.4–8.4)
PROT SERPL-MCNC: 6.7 G/DL (ref 6.4–8.4)
QRS AXIS: 73 DEGREES
QRS AXIS: 81 DEGREES
QRSD INTERVAL: 80 MS
QRSD INTERVAL: 88 MS
QT INTERVAL: 328 MS
QT INTERVAL: 344 MS
QTC INTERVAL: 411 MS
QTC INTERVAL: 412 MS
RBC # BLD AUTO: 2.55 MILLION/UL (ref 3.81–5.12)
RBC # BLD AUTO: 2.58 MILLION/UL (ref 3.81–5.12)
RBC # BLD AUTO: 2.8 MILLION/UL (ref 3.81–5.12)
RBC # BLD AUTO: 2.96 MILLION/UL (ref 3.81–5.12)
RBC # BLD AUTO: 3 MILLION/UL (ref 3.81–5.12)
RBC # BLD AUTO: 3.08 MILLION/UL (ref 3.81–5.12)
RETICS # AUTO: ABNORMAL 10*3/UL (ref 14097–95744)
RETICS # CALC: 1.9 % (ref 0.37–1.87)
RIGHT ATRIUM AREA SYSTOLE A4C: 17.4 CM2
RIGHT VENTRICLE ID DIMENSION: 3.1 CM
RSV RNA NPH QL NAA+NON-PROBE: NOT DETECTED
RV+EV RNA NPH QL NAA+NON-PROBE: NOT DETECTED
S PNEUM AG UR QL: NEGATIVE
SARS-COV+SARS-COV-2 AG RESP QL IA.RAPID: NEGATIVE
SARS-COV-2 RNA NPH QL NAA+NON-PROBE: NOT DETECTED
SL CV LEFT ATRIUM LENGTH A2C: 6.7 CM
SL CV LV EF: 72
SL CV PED ECHO LEFT VENTRICLE DIASTOLIC VOLUME (MOD BIPLANE) 2D: 61 ML
SL CV PED ECHO LEFT VENTRICLE SYSTOLIC VOLUME (MOD BIPLANE) 2D: 20 ML
SODIUM SERPL-SCNC: 138 MMOL/L (ref 135–147)
SODIUM SERPL-SCNC: 139 MMOL/L (ref 135–147)
SODIUM SERPL-SCNC: 139 MMOL/L (ref 135–147)
SPECIMEN SOURCE: ABNORMAL
SPECIMEN SOURCE: ABNORMAL
T WAVE AXIS: -25 DEGREES
T WAVE AXIS: 13 DEGREES
TIBC SERPL-MCNC: 257.6 UG/DL (ref 250–450)
TR MAX PG: 38 MMHG
TR PEAK VELOCITY: 3.1 M/S
TRANSFERRIN SERPL-MCNC: 184 MG/DL (ref 203–362)
TRICUSPID ANNULAR PLANE SYSTOLIC EXCURSION: 1.9 CM
TRICUSPID VALVE PEAK REGURGITATION VELOCITY: 3.1 M/S
VENT AC: 16
VENT BIPAP FIO2: 100 %
VENT BIPAP FIO2: 60 %
VENT- AC: AC
VENTRICULAR RATE: 86 BPM
VENTRICULAR RATE: 95 BPM
VT SETTING VENT: 350 ML
WBC # BLD AUTO: 11.65 THOUSAND/UL (ref 4.31–10.16)
WBC # BLD AUTO: 12.64 THOUSAND/UL (ref 4.31–10.16)
WBC # BLD AUTO: 13.49 THOUSAND/UL (ref 4.31–10.16)
WBC # BLD AUTO: 14.22 THOUSAND/UL (ref 4.31–10.16)
WBC # BLD AUTO: 16 THOUSAND/UL (ref 4.31–10.16)
WBC # BLD AUTO: 8.63 THOUSAND/UL (ref 4.31–10.16)

## 2025-01-01 PROCEDURE — 99285 EMERGENCY DEPT VISIT HI MDM: CPT | Performed by: EMERGENCY MEDICINE

## 2025-01-01 PROCEDURE — 94760 N-INVAS EAR/PLS OXIMETRY 1: CPT

## 2025-01-01 PROCEDURE — 94003 VENT MGMT INPAT SUBQ DAY: CPT

## 2025-01-01 PROCEDURE — 94640 AIRWAY INHALATION TREATMENT: CPT

## 2025-01-01 PROCEDURE — 87205 SMEAR GRAM STAIN: CPT | Performed by: NURSE PRACTITIONER

## 2025-01-01 PROCEDURE — 99285 EMERGENCY DEPT VISIT HI MDM: CPT

## 2025-01-01 PROCEDURE — 82746 ASSAY OF FOLIC ACID SERUM: CPT | Performed by: STUDENT IN AN ORGANIZED HEALTH CARE EDUCATION/TRAINING PROGRAM

## 2025-01-01 PROCEDURE — 85025 COMPLETE CBC W/AUTO DIFF WBC: CPT | Performed by: NURSE PRACTITIONER

## 2025-01-01 PROCEDURE — 83540 ASSAY OF IRON: CPT | Performed by: NURSE PRACTITIONER

## 2025-01-01 PROCEDURE — 84100 ASSAY OF PHOSPHORUS: CPT | Performed by: STUDENT IN AN ORGANIZED HEALTH CARE EDUCATION/TRAINING PROGRAM

## 2025-01-01 PROCEDURE — 87040 BLOOD CULTURE FOR BACTERIA: CPT

## 2025-01-01 PROCEDURE — 81207 BCR/ABL1 GENE MINOR BP: CPT | Performed by: STUDENT IN AN ORGANIZED HEALTH CARE EDUCATION/TRAINING PROGRAM

## 2025-01-01 PROCEDURE — 85045 AUTOMATED RETICULOCYTE COUNT: CPT | Performed by: STUDENT IN AN ORGANIZED HEALTH CARE EDUCATION/TRAINING PROGRAM

## 2025-01-01 PROCEDURE — 5A1945Z RESPIRATORY VENTILATION, 24-96 CONSECUTIVE HOURS: ICD-10-PCS

## 2025-01-01 PROCEDURE — 99232 SBSQ HOSP IP/OBS MODERATE 35: CPT | Performed by: STUDENT IN AN ORGANIZED HEALTH CARE EDUCATION/TRAINING PROGRAM

## 2025-01-01 PROCEDURE — 71045 X-RAY EXAM CHEST 1 VIEW: CPT

## 2025-01-01 PROCEDURE — 80053 COMPREHEN METABOLIC PANEL: CPT | Performed by: NURSE PRACTITIONER

## 2025-01-01 PROCEDURE — 84165 PROTEIN E-PHORESIS SERUM: CPT | Performed by: INTERNAL MEDICINE

## 2025-01-01 PROCEDURE — 83521 IG LIGHT CHAINS FREE EACH: CPT | Performed by: INTERNAL MEDICINE

## 2025-01-01 PROCEDURE — 0202U NFCT DS 22 TRGT SARS-COV-2: CPT | Performed by: STUDENT IN AN ORGANIZED HEALTH CARE EDUCATION/TRAINING PROGRAM

## 2025-01-01 PROCEDURE — 36600 WITHDRAWAL OF ARTERIAL BLOOD: CPT

## 2025-01-01 PROCEDURE — 83550 IRON BINDING TEST: CPT | Performed by: NURSE PRACTITIONER

## 2025-01-01 PROCEDURE — 87081 CULTURE SCREEN ONLY: CPT | Performed by: NURSE PRACTITIONER

## 2025-01-01 PROCEDURE — 94002 VENT MGMT INPAT INIT DAY: CPT

## 2025-01-01 PROCEDURE — 93306 TTE W/DOPPLER COMPLETE: CPT | Performed by: INTERNAL MEDICINE

## 2025-01-01 PROCEDURE — 71046 X-RAY EXAM CHEST 2 VIEWS: CPT

## 2025-01-01 PROCEDURE — 81206 BCR/ABL1 GENE MAJOR BP: CPT | Performed by: STUDENT IN AN ORGANIZED HEALTH CARE EDUCATION/TRAINING PROGRAM

## 2025-01-01 PROCEDURE — 83605 ASSAY OF LACTIC ACID: CPT | Performed by: NURSE PRACTITIONER

## 2025-01-01 PROCEDURE — 31500 INSERT EMERGENCY AIRWAY: CPT | Performed by: NURSE PRACTITIONER

## 2025-01-01 PROCEDURE — 99291 CRITICAL CARE FIRST HOUR: CPT

## 2025-01-01 PROCEDURE — 80048 BASIC METABOLIC PNL TOTAL CA: CPT

## 2025-01-01 PROCEDURE — 94668 MNPJ CHEST WALL SBSQ: CPT

## 2025-01-01 PROCEDURE — 84145 PROCALCITONIN (PCT): CPT

## 2025-01-01 PROCEDURE — 80053 COMPREHEN METABOLIC PANEL: CPT | Performed by: STUDENT IN AN ORGANIZED HEALTH CARE EDUCATION/TRAINING PROGRAM

## 2025-01-01 PROCEDURE — 84100 ASSAY OF PHOSPHORUS: CPT | Performed by: NURSE PRACTITIONER

## 2025-01-01 PROCEDURE — 83735 ASSAY OF MAGNESIUM: CPT

## 2025-01-01 PROCEDURE — 36415 COLL VENOUS BLD VENIPUNCTURE: CPT | Performed by: EMERGENCY MEDICINE

## 2025-01-01 PROCEDURE — 93005 ELECTROCARDIOGRAM TRACING: CPT

## 2025-01-01 PROCEDURE — 85025 COMPLETE CBC W/AUTO DIFF WBC: CPT | Performed by: STUDENT IN AN ORGANIZED HEALTH CARE EDUCATION/TRAINING PROGRAM

## 2025-01-01 PROCEDURE — 87811 SARS-COV-2 COVID19 W/OPTIC: CPT | Performed by: EMERGENCY MEDICINE

## 2025-01-01 PROCEDURE — 85027 COMPLETE CBC AUTOMATED: CPT | Performed by: NURSE PRACTITIONER

## 2025-01-01 PROCEDURE — 86335 IMMUNFIX E-PHORSIS/URINE/CSF: CPT | Performed by: INTERNAL MEDICINE

## 2025-01-01 PROCEDURE — 83880 ASSAY OF NATRIURETIC PEPTIDE: CPT | Performed by: EMERGENCY MEDICINE

## 2025-01-01 PROCEDURE — 87040 BLOOD CULTURE FOR BACTERIA: CPT | Performed by: NURSE PRACTITIONER

## 2025-01-01 PROCEDURE — 85025 COMPLETE CBC W/AUTO DIFF WBC: CPT

## 2025-01-01 PROCEDURE — 87449 NOS EACH ORGANISM AG IA: CPT | Performed by: NURSE PRACTITIONER

## 2025-01-01 PROCEDURE — 85049 AUTOMATED PLATELET COUNT: CPT | Performed by: NURSE PRACTITIONER

## 2025-01-01 PROCEDURE — NC001 PR NO CHARGE

## 2025-01-01 PROCEDURE — 84484 ASSAY OF TROPONIN QUANT: CPT | Performed by: NURSE PRACTITIONER

## 2025-01-01 PROCEDURE — 82805 BLOOD GASES W/O2 SATURATION: CPT | Performed by: NURSE PRACTITIONER

## 2025-01-01 PROCEDURE — 84145 PROCALCITONIN (PCT): CPT | Performed by: NURSE PRACTITIONER

## 2025-01-01 PROCEDURE — NC001 PR NO CHARGE: Performed by: NURSE PRACTITIONER

## 2025-01-01 PROCEDURE — 93306 TTE W/DOPPLER COMPLETE: CPT

## 2025-01-01 PROCEDURE — 87070 CULTURE OTHR SPECIMN AEROBIC: CPT | Performed by: NURSE PRACTITIONER

## 2025-01-01 PROCEDURE — 84484 ASSAY OF TROPONIN QUANT: CPT | Performed by: EMERGENCY MEDICINE

## 2025-01-01 PROCEDURE — 84165 PROTEIN E-PHORESIS SERUM: CPT | Performed by: STUDENT IN AN ORGANIZED HEALTH CARE EDUCATION/TRAINING PROGRAM

## 2025-01-01 PROCEDURE — 80053 COMPREHEN METABOLIC PANEL: CPT | Performed by: EMERGENCY MEDICINE

## 2025-01-01 PROCEDURE — 94664 DEMO&/EVAL PT USE INHALER: CPT

## 2025-01-01 PROCEDURE — 82728 ASSAY OF FERRITIN: CPT | Performed by: NURSE PRACTITIONER

## 2025-01-01 PROCEDURE — 93010 ELECTROCARDIOGRAM REPORT: CPT | Performed by: INTERNAL MEDICINE

## 2025-01-01 PROCEDURE — 83735 ASSAY OF MAGNESIUM: CPT | Performed by: STUDENT IN AN ORGANIZED HEALTH CARE EDUCATION/TRAINING PROGRAM

## 2025-01-01 PROCEDURE — 99291 CRITICAL CARE FIRST HOUR: CPT | Performed by: EMERGENCY MEDICINE

## 2025-01-01 PROCEDURE — 85025 COMPLETE CBC W/AUTO DIFF WBC: CPT | Performed by: EMERGENCY MEDICINE

## 2025-01-01 PROCEDURE — 0BH18EZ INSERTION OF ENDOTRACHEAL AIRWAY INTO TRACHEA, VIA NATURAL OR ARTIFICIAL OPENING ENDOSCOPIC: ICD-10-PCS

## 2025-01-01 PROCEDURE — 94669 MECHANICAL CHEST WALL OSCILL: CPT

## 2025-01-01 PROCEDURE — 99232 SBSQ HOSP IP/OBS MODERATE 35: CPT | Performed by: INTERNAL MEDICINE

## 2025-01-01 PROCEDURE — 86334 IMMUNOFIX E-PHORESIS SERUM: CPT | Performed by: STUDENT IN AN ORGANIZED HEALTH CARE EDUCATION/TRAINING PROGRAM

## 2025-01-01 PROCEDURE — 84166 PROTEIN E-PHORESIS/URINE/CSF: CPT | Performed by: INTERNAL MEDICINE

## 2025-01-01 PROCEDURE — 99222 1ST HOSP IP/OBS MODERATE 55: CPT | Performed by: INTERNAL MEDICINE

## 2025-01-01 PROCEDURE — 86334 IMMUNOFIX E-PHORESIS SERUM: CPT | Performed by: INTERNAL MEDICINE

## 2025-01-01 PROCEDURE — 80048 BASIC METABOLIC PNL TOTAL CA: CPT | Performed by: NURSE PRACTITIONER

## 2025-01-01 PROCEDURE — 87804 INFLUENZA ASSAY W/OPTIC: CPT | Performed by: EMERGENCY MEDICINE

## 2025-01-01 PROCEDURE — 85018 HEMOGLOBIN: CPT | Performed by: NURSE PRACTITIONER

## 2025-01-01 PROCEDURE — 85379 FIBRIN DEGRADATION QUANT: CPT | Performed by: EMERGENCY MEDICINE

## 2025-01-01 PROCEDURE — 83735 ASSAY OF MAGNESIUM: CPT | Performed by: NURSE PRACTITIONER

## 2025-01-01 PROCEDURE — 83605 ASSAY OF LACTIC ACID: CPT

## 2025-01-01 PROCEDURE — 99223 1ST HOSP IP/OBS HIGH 75: CPT | Performed by: STUDENT IN AN ORGANIZED HEALTH CARE EDUCATION/TRAINING PROGRAM

## 2025-01-01 RX ORDER — ALPRAZOLAM 0.25 MG/1
0.25 TABLET ORAL ONCE
Status: DISCONTINUED | OUTPATIENT
Start: 2025-01-01 | End: 2025-01-01

## 2025-01-01 RX ORDER — AMLODIPINE BESYLATE 2.5 MG/1
2.5 TABLET ORAL DAILY
Status: DISCONTINUED | OUTPATIENT
Start: 2025-01-01 | End: 2025-01-01

## 2025-01-01 RX ORDER — POTASSIUM CHLORIDE 20MEQ/15ML
40 LIQUID (ML) ORAL ONCE
Status: COMPLETED | OUTPATIENT
Start: 2025-01-01 | End: 2025-01-01

## 2025-01-01 RX ORDER — GLYCOPYRROLATE 0.2 MG/ML
0.1 INJECTION INTRAMUSCULAR; INTRAVENOUS EVERY 4 HOURS PRN
Status: DISCONTINUED | OUTPATIENT
Start: 2025-01-01 | End: 2025-01-01 | Stop reason: HOSPADM

## 2025-01-01 RX ORDER — OSELTAMIVIR PHOSPHATE 30 MG/1
30 CAPSULE ORAL EVERY 24 HOURS
Status: DISCONTINUED | OUTPATIENT
Start: 2025-01-01 | End: 2025-01-01

## 2025-01-01 RX ORDER — CALCIUM GLUCONATE 20 MG/ML
1 INJECTION, SOLUTION INTRAVENOUS ONCE
Status: COMPLETED | OUTPATIENT
Start: 2025-01-01 | End: 2025-01-01

## 2025-01-01 RX ORDER — IPRATROPIUM BROMIDE AND ALBUTEROL SULFATE 2.5; .5 MG/3ML; MG/3ML
3 SOLUTION RESPIRATORY (INHALATION) EVERY 6 HOURS PRN
Status: DISCONTINUED | OUTPATIENT
Start: 2025-01-01 | End: 2025-01-01

## 2025-01-01 RX ORDER — FUROSEMIDE 10 MG/ML
20 INJECTION INTRAMUSCULAR; INTRAVENOUS ONCE
Status: COMPLETED | OUTPATIENT
Start: 2025-01-01 | End: 2025-01-01

## 2025-01-01 RX ORDER — SODIUM CHLORIDE, SODIUM GLUCONATE, SODIUM ACETATE, POTASSIUM CHLORIDE, MAGNESIUM CHLORIDE, SODIUM PHOSPHATE, DIBASIC, AND POTASSIUM PHOSPHATE .53; .5; .37; .037; .03; .012; .00082 G/100ML; G/100ML; G/100ML; G/100ML; G/100ML; G/100ML; G/100ML
500 INJECTION, SOLUTION INTRAVENOUS ONCE
Status: COMPLETED | OUTPATIENT
Start: 2025-01-01 | End: 2025-01-01

## 2025-01-01 RX ORDER — HEPARIN SODIUM 5000 [USP'U]/ML
5000 INJECTION, SOLUTION INTRAVENOUS; SUBCUTANEOUS EVERY 8 HOURS SCHEDULED
Status: DISCONTINUED | OUTPATIENT
Start: 2025-01-01 | End: 2025-01-01

## 2025-01-01 RX ORDER — FUROSEMIDE 10 MG/ML
40 INJECTION INTRAMUSCULAR; INTRAVENOUS
Status: DISCONTINUED | OUTPATIENT
Start: 2025-01-01 | End: 2025-01-01 | Stop reason: HOSPADM

## 2025-01-01 RX ORDER — LEVALBUTEROL INHALATION SOLUTION 1.25 MG/3ML
1.25 SOLUTION RESPIRATORY (INHALATION)
Status: DISCONTINUED | OUTPATIENT
Start: 2025-01-01 | End: 2025-01-01

## 2025-01-01 RX ORDER — HYDROXYZINE HYDROCHLORIDE 10 MG/1
10 TABLET, FILM COATED ORAL ONCE
Status: COMPLETED | OUTPATIENT
Start: 2025-01-01 | End: 2025-01-01

## 2025-01-01 RX ORDER — PROPOFOL 10 MG/ML
5-50 INJECTION, EMULSION INTRAVENOUS
Status: DISCONTINUED | OUTPATIENT
Start: 2025-01-01 | End: 2025-01-01

## 2025-01-01 RX ORDER — ACETAMINOPHEN 325 MG/1
650 TABLET ORAL EVERY 6 HOURS PRN
Status: DISCONTINUED | OUTPATIENT
Start: 2025-01-01 | End: 2025-01-01 | Stop reason: HOSPADM

## 2025-01-01 RX ORDER — ACETAMINOPHEN 325 MG/1
650 TABLET ORAL EVERY 6 HOURS PRN
Status: DISCONTINUED | OUTPATIENT
Start: 2025-01-01 | End: 2025-01-01

## 2025-01-01 RX ORDER — LABETALOL HYDROCHLORIDE 5 MG/ML
10 INJECTION, SOLUTION INTRAVENOUS EVERY 6 HOURS PRN
Status: DISCONTINUED | OUTPATIENT
Start: 2025-01-01 | End: 2025-01-01

## 2025-01-01 RX ORDER — ACETYLCYSTEINE 200 MG/ML
3 SOLUTION ORAL; RESPIRATORY (INHALATION)
Status: DISCONTINUED | OUTPATIENT
Start: 2025-01-01 | End: 2025-01-01

## 2025-01-01 RX ORDER — DEXMEDETOMIDINE HYDROCHLORIDE 4 UG/ML
.1-.7 INJECTION, SOLUTION INTRAVENOUS
Status: DISCONTINUED | OUTPATIENT
Start: 2025-01-01 | End: 2025-01-01 | Stop reason: HOSPADM

## 2025-01-01 RX ORDER — HYDROXYZINE HYDROCHLORIDE 10 MG/1
10 TABLET, FILM COATED ORAL
Status: DISCONTINUED | OUTPATIENT
Start: 2025-01-01 | End: 2025-01-01

## 2025-01-01 RX ORDER — ALBUMIN HUMAN 50 G/1000ML
25 SOLUTION INTRAVENOUS ONCE
Status: COMPLETED | OUTPATIENT
Start: 2025-01-01 | End: 2025-01-01

## 2025-01-01 RX ORDER — ATORVASTATIN CALCIUM 40 MG/1
40 TABLET, FILM COATED ORAL EVERY EVENING
Status: DISCONTINUED | OUTPATIENT
Start: 2025-01-01 | End: 2025-01-01

## 2025-01-01 RX ORDER — OSELTAMIVIR PHOSPHATE 30 MG/1
30 CAPSULE ORAL
Status: DISCONTINUED | OUTPATIENT
Start: 2025-01-14 | End: 2025-01-01

## 2025-01-01 RX ORDER — CHLORHEXIDINE GLUCONATE ORAL RINSE 1.2 MG/ML
15 SOLUTION DENTAL EVERY 12 HOURS SCHEDULED
Status: DISCONTINUED | OUTPATIENT
Start: 2025-01-01 | End: 2025-01-01 | Stop reason: HOSPADM

## 2025-01-01 RX ORDER — FUROSEMIDE 10 MG/ML
40 INJECTION INTRAMUSCULAR; INTRAVENOUS ONCE
Status: COMPLETED | OUTPATIENT
Start: 2025-01-01 | End: 2025-01-01

## 2025-01-01 RX ORDER — SODIUM CHLORIDE FOR INHALATION 3 %
4 VIAL, NEBULIZER (ML) INHALATION
Status: DISCONTINUED | OUTPATIENT
Start: 2025-01-01 | End: 2025-01-01

## 2025-01-01 RX ORDER — METOPROLOL SUCCINATE 25 MG/1
25 TABLET, EXTENDED RELEASE ORAL ONCE
Status: COMPLETED | OUTPATIENT
Start: 2025-01-01 | End: 2025-01-01

## 2025-01-01 RX ORDER — HALOPERIDOL 5 MG/ML
0.5 INJECTION INTRAMUSCULAR EVERY 2 HOUR PRN
Status: DISCONTINUED | OUTPATIENT
Start: 2025-01-01 | End: 2025-01-01

## 2025-01-01 RX ORDER — POTASSIUM CHLORIDE 14.9 MG/ML
20 INJECTION INTRAVENOUS ONCE
Status: COMPLETED | OUTPATIENT
Start: 2025-01-01 | End: 2025-01-01

## 2025-01-01 RX ORDER — FENTANYL CITRATE 50 UG/ML
50 INJECTION, SOLUTION INTRAMUSCULAR; INTRAVENOUS ONCE
Refills: 0 | Status: COMPLETED | OUTPATIENT
Start: 2025-01-01 | End: 2025-01-01

## 2025-01-01 RX ORDER — FENTANYL CITRATE 50 UG/ML
50 INJECTION, SOLUTION INTRAMUSCULAR; INTRAVENOUS
Refills: 0 | Status: DISCONTINUED | OUTPATIENT
Start: 2025-01-01 | End: 2025-01-01

## 2025-01-01 RX ORDER — HYDRALAZINE HYDROCHLORIDE 20 MG/ML
5 INJECTION INTRAMUSCULAR; INTRAVENOUS ONCE
Status: COMPLETED | OUTPATIENT
Start: 2025-01-01 | End: 2025-01-01

## 2025-01-01 RX ORDER — BISACODYL 10 MG
10 SUPPOSITORY, RECTAL RECTAL DAILY PRN
Status: DISCONTINUED | OUTPATIENT
Start: 2025-01-01 | End: 2025-01-01 | Stop reason: HOSPADM

## 2025-01-01 RX ORDER — ONDANSETRON 2 MG/ML
4 INJECTION INTRAMUSCULAR; INTRAVENOUS EVERY 6 HOURS PRN
Status: DISCONTINUED | OUTPATIENT
Start: 2025-01-01 | End: 2025-01-01 | Stop reason: HOSPADM

## 2025-01-01 RX ORDER — FENTANYL CITRATE 50 UG/ML
INJECTION, SOLUTION INTRAMUSCULAR; INTRAVENOUS
Status: COMPLETED
Start: 2025-01-01 | End: 2025-01-01

## 2025-01-01 RX ORDER — AMLODIPINE BESYLATE 2.5 MG/1
2.5 TABLET ORAL ONCE
Status: COMPLETED | OUTPATIENT
Start: 2025-01-01 | End: 2025-01-01

## 2025-01-01 RX ORDER — LORAZEPAM 2 MG/ML
1 INJECTION INTRAMUSCULAR
Status: DISCONTINUED | OUTPATIENT
Start: 2025-01-01 | End: 2025-01-01 | Stop reason: HOSPADM

## 2025-01-01 RX ORDER — PANTOPRAZOLE SODIUM 40 MG/10ML
40 INJECTION, POWDER, LYOPHILIZED, FOR SOLUTION INTRAVENOUS
Status: DISCONTINUED | OUTPATIENT
Start: 2025-01-01 | End: 2025-01-01

## 2025-01-01 RX ORDER — DEXTROSE MONOHYDRATE AND SODIUM CHLORIDE 5; .9 G/100ML; G/100ML
50 INJECTION, SOLUTION INTRAVENOUS CONTINUOUS
Status: DISCONTINUED | OUTPATIENT
Start: 2025-01-01 | End: 2025-01-01

## 2025-01-01 RX ORDER — ACETAMINOPHEN 325 MG/1
975 TABLET ORAL ONCE
Status: COMPLETED | OUTPATIENT
Start: 2025-01-01 | End: 2025-01-01

## 2025-01-01 RX ORDER — METOPROLOL SUCCINATE 25 MG/1
25 TABLET, EXTENDED RELEASE ORAL DAILY
Status: DISCONTINUED | OUTPATIENT
Start: 2025-01-01 | End: 2025-01-01 | Stop reason: HOSPADM

## 2025-01-01 RX ORDER — ASPIRIN 81 MG/1
81 TABLET, CHEWABLE ORAL DAILY
Status: DISCONTINUED | OUTPATIENT
Start: 2025-01-01 | End: 2025-01-01

## 2025-01-01 RX ORDER — ALBUTEROL SULFATE 0.83 MG/ML
2.5 SOLUTION RESPIRATORY (INHALATION) ONCE
Status: COMPLETED | OUTPATIENT
Start: 2025-01-01 | End: 2025-01-01

## 2025-01-01 RX ADMIN — MORPHINE SULFATE 2 MG: 2 INJECTION, SOLUTION INTRAMUSCULAR; INTRAVENOUS at 16:39

## 2025-01-01 RX ADMIN — PROPOFOL 30 MCG/KG/MIN: 10 INJECTION, EMULSION INTRAVENOUS at 01:06

## 2025-01-01 RX ADMIN — GLYCOPYRROLATE 0.1 MG: 0.4 INJECTION INTRAMUSCULAR; INTRAVENOUS at 15:16

## 2025-01-01 RX ADMIN — PROPOFOL 30 MCG/KG/MIN: 10 INJECTION, EMULSION INTRAVENOUS at 00:43

## 2025-01-01 RX ADMIN — DEXMEDETOMIDINE HYDROCHLORIDE 0.1 MCG/KG/HR: 4 INJECTION, SOLUTION INTRAVENOUS at 11:55

## 2025-01-01 RX ADMIN — ACETAMINOPHEN 650 MG: 325 TABLET, FILM COATED ORAL at 20:06

## 2025-01-01 RX ADMIN — OSELTAMIVIR PHOSPHATE 30 MG: 30 CAPSULE ORAL at 20:05

## 2025-01-01 RX ADMIN — HEPARIN SODIUM 5000 UNITS: 5000 INJECTION INTRAVENOUS; SUBCUTANEOUS at 13:13

## 2025-01-01 RX ADMIN — DEXTROSE AND SODIUM CHLORIDE 50 ML/HR: 5; .9 INJECTION, SOLUTION INTRAVENOUS at 14:18

## 2025-01-01 RX ADMIN — FENTANYL CITRATE 50 MCG: 50 INJECTION INTRAMUSCULAR; INTRAVENOUS at 03:57

## 2025-01-01 RX ADMIN — METOPROLOL SUCCINATE 25 MG: 25 TABLET, EXTENDED RELEASE ORAL at 07:52

## 2025-01-01 RX ADMIN — POTASSIUM CHLORIDE 20 MEQ: 14.9 INJECTION, SOLUTION INTRAVENOUS at 15:38

## 2025-01-01 RX ADMIN — HEPARIN SODIUM 5000 UNITS: 5000 INJECTION INTRAVENOUS; SUBCUTANEOUS at 22:55

## 2025-01-01 RX ADMIN — IPRATROPIUM BROMIDE AND ALBUTEROL SULFATE 3 ML: 2.5; .5 SOLUTION RESPIRATORY (INHALATION) at 07:30

## 2025-01-01 RX ADMIN — FENTANYL CITRATE 50 MCG: 50 INJECTION INTRAMUSCULAR; INTRAVENOUS at 18:50

## 2025-01-01 RX ADMIN — MORPHINE SULFATE 2 MG: 2 INJECTION, SOLUTION INTRAMUSCULAR; INTRAVENOUS at 15:16

## 2025-01-01 RX ADMIN — LORAZEPAM 1 MG: 2 INJECTION INTRAMUSCULAR; INTRAVENOUS at 16:05

## 2025-01-01 RX ADMIN — OSELTAMIVIR PHOSPHATE 30 MG: 30 CAPSULE ORAL at 21:00

## 2025-01-01 RX ADMIN — ONDANSETRON 4 MG: 2 INJECTION INTRAMUSCULAR; INTRAVENOUS at 22:26

## 2025-01-01 RX ADMIN — HEPARIN SODIUM 5000 UNITS: 5000 INJECTION INTRAVENOUS; SUBCUTANEOUS at 06:20

## 2025-01-01 RX ADMIN — ASPIRIN 81 MG CHEWABLE TABLET 81 MG: 81 TABLET CHEWABLE at 08:21

## 2025-01-01 RX ADMIN — HEPARIN SODIUM 5000 UNITS: 5000 INJECTION INTRAVENOUS; SUBCUTANEOUS at 05:46

## 2025-01-01 RX ADMIN — ASPIRIN 81 MG CHEWABLE TABLET 81 MG: 81 TABLET CHEWABLE at 09:03

## 2025-01-01 RX ADMIN — PROPOFOL 20 MCG/KG/MIN: 10 INJECTION, EMULSION INTRAVENOUS at 18:26

## 2025-01-01 RX ADMIN — DEXTROSE 2000 MG: 50 INJECTION, SOLUTION INTRAVENOUS at 23:29

## 2025-01-01 RX ADMIN — CHLORHEXIDINE GLUCONATE 0.12% ORAL RINSE 15 ML: 1.2 LIQUID ORAL at 20:24

## 2025-01-01 RX ADMIN — ACETYLCYSTEINE 3 ML: 200 SOLUTION ORAL; RESPIRATORY (INHALATION) at 15:02

## 2025-01-01 RX ADMIN — FUROSEMIDE 40 MG: 10 INJECTION, SOLUTION INTRAVENOUS at 14:51

## 2025-01-01 RX ADMIN — HEPARIN SODIUM 5000 UNITS: 5000 INJECTION INTRAVENOUS; SUBCUTANEOUS at 14:17

## 2025-01-01 RX ADMIN — DEXTROSE 2000 MG: 50 INJECTION, SOLUTION INTRAVENOUS at 22:41

## 2025-01-01 RX ADMIN — PANTOPRAZOLE SODIUM 40 MG: 40 INJECTION, POWDER, FOR SOLUTION INTRAVENOUS at 08:21

## 2025-01-01 RX ADMIN — MORPHINE SULFATE 2 MG: 2 INJECTION, SOLUTION INTRAMUSCULAR; INTRAVENOUS at 12:58

## 2025-01-01 RX ADMIN — CALCIUM GLUCONATE 1 G: 20 INJECTION, SOLUTION INTRAVENOUS at 14:17

## 2025-01-01 RX ADMIN — PROPOFOL 20 MCG/KG/MIN: 10 INJECTION, EMULSION INTRAVENOUS at 18:51

## 2025-01-01 RX ADMIN — IPRATROPIUM BROMIDE AND ALBUTEROL SULFATE 3 ML: 2.5; .5 SOLUTION RESPIRATORY (INHALATION) at 03:00

## 2025-01-01 RX ADMIN — PROPOFOL 30 MCG/KG/MIN: 10 INJECTION, EMULSION INTRAVENOUS at 07:47

## 2025-01-01 RX ADMIN — IPRATROPIUM BROMIDE AND ALBUTEROL SULFATE 3 ML: 2.5; .5 SOLUTION RESPIRATORY (INHALATION) at 13:20

## 2025-01-01 RX ADMIN — ALBUMIN (HUMAN) 25 G: 12.5 INJECTION, SOLUTION INTRAVENOUS at 08:21

## 2025-01-01 RX ADMIN — MELATONIN 6 MG: 3 TAB ORAL at 21:06

## 2025-01-01 RX ADMIN — CHLORHEXIDINE GLUCONATE 0.12% ORAL RINSE 15 ML: 1.2 LIQUID ORAL at 22:55

## 2025-01-01 RX ADMIN — ACETAMINOPHEN 650 MG: 325 TABLET, FILM COATED ORAL at 09:02

## 2025-01-01 RX ADMIN — FENTANYL CITRATE 50 MCG: 50 INJECTION INTRAMUSCULAR; INTRAVENOUS at 18:14

## 2025-01-01 RX ADMIN — HEPARIN SODIUM 5000 UNITS: 5000 INJECTION INTRAVENOUS; SUBCUTANEOUS at 21:44

## 2025-01-01 RX ADMIN — ACETAMINOPHEN 650 MG: 325 TABLET, FILM COATED ORAL at 06:34

## 2025-01-01 RX ADMIN — DEXTROSE AND SODIUM CHLORIDE 500 ML: 5; .9 INJECTION, SOLUTION INTRAVENOUS at 14:17

## 2025-01-01 RX ADMIN — CHLORHEXIDINE GLUCONATE 0.12% ORAL RINSE 15 ML: 1.2 LIQUID ORAL at 08:21

## 2025-01-01 RX ADMIN — ACETYLCYSTEINE 600 MG: 200 SOLUTION ORAL; RESPIRATORY (INHALATION) at 13:20

## 2025-01-01 RX ADMIN — DEXTROSE 2000 MG: 50 INJECTION, SOLUTION INTRAVENOUS at 21:44

## 2025-01-01 RX ADMIN — ATORVASTATIN CALCIUM 40 MG: 40 TABLET, FILM COATED ORAL at 17:03

## 2025-01-01 RX ADMIN — SODIUM CHLORIDE SOLN NEBU 3% 4 ML: 3 NEBU SOLN at 08:32

## 2025-01-01 RX ADMIN — FENTANYL CITRATE 50 MCG: 50 INJECTION, SOLUTION INTRAMUSCULAR; INTRAVENOUS at 18:14

## 2025-01-01 RX ADMIN — ACETYLCYSTEINE 600 MG: 200 SOLUTION ORAL; RESPIRATORY (INHALATION) at 07:30

## 2025-01-01 RX ADMIN — HEPARIN SODIUM 5000 UNITS: 5000 INJECTION INTRAVENOUS; SUBCUTANEOUS at 05:34

## 2025-01-01 RX ADMIN — POTASSIUM CHLORIDE 40 MEQ: 20 SOLUTION ORAL at 14:17

## 2025-01-01 RX ADMIN — ACETYLCYSTEINE 600 MG: 200 SOLUTION ORAL; RESPIRATORY (INHALATION) at 19:25

## 2025-01-01 RX ADMIN — ASPIRIN 81 MG CHEWABLE TABLET 81 MG: 81 TABLET CHEWABLE at 12:33

## 2025-01-01 RX ADMIN — PANTOPRAZOLE SODIUM 40 MG: 40 INJECTION, POWDER, FOR SOLUTION INTRAVENOUS at 13:00

## 2025-01-01 RX ADMIN — DEXTROSE 2000 MG: 50 INJECTION, SOLUTION INTRAVENOUS at 22:55

## 2025-01-01 RX ADMIN — SODIUM CHLORIDE SOLN NEBU 3% 4 ML: 3 NEBU SOLN at 03:00

## 2025-01-01 RX ADMIN — METOPROLOL SUCCINATE 25 MG: 25 TABLET, EXTENDED RELEASE ORAL at 09:02

## 2025-01-01 RX ADMIN — ONDANSETRON 4 MG: 2 INJECTION INTRAMUSCULAR; INTRAVENOUS at 16:15

## 2025-01-01 RX ADMIN — FENTANYL CITRATE 50 MCG: 50 INJECTION INTRAMUSCULAR; INTRAVENOUS at 06:14

## 2025-01-01 RX ADMIN — HYDRALAZINE HYDROCHLORIDE 5 MG: 20 INJECTION, SOLUTION INTRAMUSCULAR; INTRAVENOUS at 09:44

## 2025-01-01 RX ADMIN — DEXTROSE AND SODIUM CHLORIDE 50 ML/HR: 5; .9 INJECTION, SOLUTION INTRAVENOUS at 04:46

## 2025-01-01 RX ADMIN — LABETALOL HYDROCHLORIDE 10 MG: 5 INJECTION, SOLUTION INTRAVENOUS at 10:37

## 2025-01-01 RX ADMIN — ACETAMINOPHEN 975 MG: 325 TABLET, FILM COATED ORAL at 11:03

## 2025-01-01 RX ADMIN — MELATONIN 3 MG: 3 TAB ORAL at 22:26

## 2025-01-01 RX ADMIN — HEPARIN SODIUM 5000 UNITS: 5000 INJECTION INTRAVENOUS; SUBCUTANEOUS at 22:42

## 2025-01-01 RX ADMIN — IPRATROPIUM BROMIDE AND ALBUTEROL SULFATE 3 ML: 2.5; .5 SOLUTION RESPIRATORY (INHALATION) at 16:19

## 2025-01-01 RX ADMIN — FENTANYL CITRATE 50 MCG: 50 INJECTION INTRAMUSCULAR; INTRAVENOUS at 01:32

## 2025-01-01 RX ADMIN — FENTANYL CITRATE 50 MCG: 50 INJECTION INTRAMUSCULAR; INTRAVENOUS at 15:37

## 2025-01-01 RX ADMIN — FENTANYL CITRATE 50 MCG: 50 INJECTION INTRAMUSCULAR; INTRAVENOUS at 19:43

## 2025-01-01 RX ADMIN — FUROSEMIDE 20 MG: 10 INJECTION, SOLUTION INTRAVENOUS at 09:46

## 2025-01-01 RX ADMIN — AMLODIPINE BESYLATE 2.5 MG: 2.5 TABLET ORAL at 07:52

## 2025-01-01 RX ADMIN — HYDROXYZINE HYDROCHLORIDE 10 MG: 10 TABLET ORAL at 15:45

## 2025-01-01 RX ADMIN — PANTOPRAZOLE SODIUM 40 MG: 40 INJECTION, POWDER, FOR SOLUTION INTRAVENOUS at 12:33

## 2025-01-01 RX ADMIN — SODIUM CHLORIDE SOLN NEBU 3% 4 ML: 3 NEBU SOLN at 21:23

## 2025-01-01 RX ADMIN — ATORVASTATIN CALCIUM 40 MG: 40 TABLET, FILM COATED ORAL at 17:46

## 2025-01-01 RX ADMIN — HYDROXYZINE HYDROCHLORIDE 10 MG: 10 TABLET ORAL at 22:26

## 2025-01-01 RX ADMIN — CHLORHEXIDINE GLUCONATE 0.12% ORAL RINSE 15 ML: 1.2 LIQUID ORAL at 20:06

## 2025-01-01 RX ADMIN — CHLORHEXIDINE GLUCONATE 0.12% ORAL RINSE 15 ML: 1.2 LIQUID ORAL at 09:00

## 2025-01-01 RX ADMIN — HEPARIN SODIUM 5000 UNITS: 5000 INJECTION INTRAVENOUS; SUBCUTANEOUS at 16:15

## 2025-01-01 RX ADMIN — CHLORHEXIDINE GLUCONATE 0.12% ORAL RINSE 15 ML: 1.2 LIQUID ORAL at 12:33

## 2025-01-01 RX ADMIN — SODIUM CHLORIDE SOLN NEBU 3% 4 ML: 3 NEBU SOLN at 13:28

## 2025-01-01 RX ADMIN — FUROSEMIDE 40 MG: 10 INJECTION, SOLUTION INTRAVENOUS at 15:56

## 2025-01-01 RX ADMIN — SODIUM CHLORIDE, SODIUM GLUCONATE, SODIUM ACETATE, POTASSIUM CHLORIDE, MAGNESIUM CHLORIDE, SODIUM PHOSPHATE, DIBASIC, AND POTASSIUM PHOSPHATE 500 ML: .53; .5; .37; .037; .03; .012; .00082 INJECTION, SOLUTION INTRAVENOUS at 02:06

## 2025-01-01 RX ADMIN — ALBUTEROL SULFATE 2.5 MG: 2.5 SOLUTION RESPIRATORY (INHALATION) at 20:52

## 2025-01-01 RX ADMIN — IPRATROPIUM BROMIDE AND ALBUTEROL SULFATE 3 ML: 2.5; .5 SOLUTION RESPIRATORY (INHALATION) at 10:19

## 2025-01-02 ENCOUNTER — LAB (OUTPATIENT)
Dept: LAB | Facility: MEDICAL CENTER | Age: OVER 89
End: 2025-01-02
Payer: COMMERCIAL

## 2025-01-02 ENCOUNTER — OFFICE VISIT (OUTPATIENT)
Dept: PHYSICAL THERAPY | Facility: MEDICAL CENTER | Age: OVER 89
End: 2025-01-02
Payer: COMMERCIAL

## 2025-01-02 DIAGNOSIS — D75.839 THROMBOCYTOSIS: ICD-10-CM

## 2025-01-02 DIAGNOSIS — M48.062 LUMBAR STENOSIS WITH NEUROGENIC CLAUDICATION: Primary | ICD-10-CM

## 2025-01-02 DIAGNOSIS — E78.2 MIXED HYPERLIPIDEMIA: ICD-10-CM

## 2025-01-02 DIAGNOSIS — R73.01 ELEVATED FASTING GLUCOSE: ICD-10-CM

## 2025-01-02 DIAGNOSIS — D47.3 IDIOPATHIC THROMBOCYTHEMIA (HCC): ICD-10-CM

## 2025-01-02 DIAGNOSIS — E53.8 VITAMIN B 12 DEFICIENCY: ICD-10-CM

## 2025-01-02 LAB
ALBUMIN SERPL BCG-MCNC: 4.1 G/DL (ref 3.5–5)
ALP SERPL-CCNC: 71 U/L (ref 34–104)
ALT SERPL W P-5'-P-CCNC: 15 U/L (ref 7–52)
ANION GAP SERPL CALCULATED.3IONS-SCNC: 7 MMOL/L (ref 4–13)
AST SERPL W P-5'-P-CCNC: 20 U/L (ref 13–39)
BASOPHILS # BLD AUTO: 0.08 THOUSANDS/ΜL (ref 0–0.1)
BASOPHILS NFR BLD AUTO: 1 % (ref 0–1)
BILIRUB SERPL-MCNC: 0.46 MG/DL (ref 0.2–1)
BUN SERPL-MCNC: 31 MG/DL (ref 5–25)
CALCIUM SERPL-MCNC: 9.1 MG/DL (ref 8.4–10.2)
CHLORIDE SERPL-SCNC: 107 MMOL/L (ref 96–108)
CHOLEST SERPL-MCNC: 130 MG/DL (ref ?–200)
CO2 SERPL-SCNC: 29 MMOL/L (ref 21–32)
CREAT SERPL-MCNC: 1.25 MG/DL (ref 0.6–1.3)
EOSINOPHIL # BLD AUTO: 0.43 THOUSAND/ΜL (ref 0–0.61)
EOSINOPHIL NFR BLD AUTO: 4 % (ref 0–6)
ERYTHROCYTE [DISTWIDTH] IN BLOOD BY AUTOMATED COUNT: 16.9 % (ref 11.6–15.1)
FERRITIN SERPL-MCNC: 78 NG/ML (ref 11–307)
GFR SERPL CREATININE-BSD FRML MDRD: 37 ML/MIN/1.73SQ M
GLUCOSE P FAST SERPL-MCNC: 100 MG/DL (ref 65–99)
HCT VFR BLD AUTO: 27.3 % (ref 34.8–46.1)
HDLC SERPL-MCNC: 44 MG/DL
HGB BLD-MCNC: 8.8 G/DL (ref 11.5–15.4)
IMM GRANULOCYTES # BLD AUTO: 0.04 THOUSAND/UL (ref 0–0.2)
IMM GRANULOCYTES NFR BLD AUTO: 0 % (ref 0–2)
IRON SATN MFR SERPL: 19 % (ref 15–50)
IRON SERPL-MCNC: 58 UG/DL (ref 50–212)
LDLC SERPL CALC-MCNC: 60 MG/DL (ref 0–100)
LYMPHOCYTES # BLD AUTO: 2.56 THOUSANDS/ΜL (ref 0.6–4.47)
LYMPHOCYTES NFR BLD AUTO: 26 % (ref 14–44)
MCH RBC QN AUTO: 31.2 PG (ref 26.8–34.3)
MCHC RBC AUTO-ENTMCNC: 32.2 G/DL (ref 31.4–37.4)
MCV RBC AUTO: 97 FL (ref 82–98)
MONOCYTES # BLD AUTO: 1.13 THOUSAND/ΜL (ref 0.17–1.22)
MONOCYTES NFR BLD AUTO: 11 % (ref 4–12)
NEUTROPHILS # BLD AUTO: 5.73 THOUSANDS/ΜL (ref 1.85–7.62)
NEUTS SEG NFR BLD AUTO: 58 % (ref 43–75)
NONHDLC SERPL-MCNC: 86 MG/DL
NRBC BLD AUTO-RTO: 0 /100 WBCS
PLATELET # BLD AUTO: 845 THOUSANDS/UL (ref 149–390)
PMV BLD AUTO: 10.3 FL (ref 8.9–12.7)
POTASSIUM SERPL-SCNC: 4.3 MMOL/L (ref 3.5–5.3)
PROT SERPL-MCNC: 6.5 G/DL (ref 6.4–8.4)
RBC # BLD AUTO: 2.82 MILLION/UL (ref 3.81–5.12)
SODIUM SERPL-SCNC: 143 MMOL/L (ref 135–147)
TIBC SERPL-MCNC: 306.6 UG/DL (ref 250–450)
TRANSFERRIN SERPL-MCNC: 219 MG/DL (ref 203–362)
TRIGL SERPL-MCNC: 132 MG/DL (ref ?–150)
UIBC SERPL-MCNC: 249 UG/DL (ref 155–355)
VIT B12 SERPL-MCNC: 479 PG/ML (ref 180–914)
WBC # BLD AUTO: 9.97 THOUSAND/UL (ref 4.31–10.16)

## 2025-01-02 PROCEDURE — 83550 IRON BINDING TEST: CPT

## 2025-01-02 PROCEDURE — 80053 COMPREHEN METABOLIC PANEL: CPT

## 2025-01-02 PROCEDURE — 80061 LIPID PANEL: CPT

## 2025-01-02 PROCEDURE — 82728 ASSAY OF FERRITIN: CPT

## 2025-01-02 PROCEDURE — 97110 THERAPEUTIC EXERCISES: CPT

## 2025-01-02 PROCEDURE — 83540 ASSAY OF IRON: CPT

## 2025-01-02 PROCEDURE — 85025 COMPLETE CBC W/AUTO DIFF WBC: CPT

## 2025-01-02 PROCEDURE — 82607 VITAMIN B-12: CPT

## 2025-01-02 PROCEDURE — 36415 COLL VENOUS BLD VENIPUNCTURE: CPT

## 2025-01-02 NOTE — RESULT ENCOUNTER NOTE
PLEASE CALL PT - AND OR HER DTR -   HER LABS CONTINUE TO SHOW A VERY ELEVATED PLATELET COUNT   I AM CONCERNED ABOUT THIS - AND IT NEEDS FURTHER WORKUP- WITH HEM/ONC- I HAD PREVIOUSLY PLACED A REFERRAL.  HER HEMOGLOBIN IS ALSO LOWER THAN PRIOR  ANY BLEEDING FROM ANYWHERE?  THEN SEND BACK TO ME  THANKS.

## 2025-01-02 NOTE — PROGRESS NOTES
Daily Note     Today's date: 2025  Patient name: Nilsa Morales  : 2/3/1934  MRN: [id number]  Referring provider: Danii Aceves DO  Dx:   Encounter Diagnosis     ICD-10-CM    1. Lumbar stenosis with neurogenic claudication  M48.062                      Subjective: Pt reports that she's feeling ok, but still has discomfort and a feeling of weakness in her legs.       Objective: See treatment diary below      Assessment: Tolerated treatment well. Patient demonstrated fatigue post treatment, exhibited good technique with therapeutic exercises, and would benefit from continued PT  Pt is making steady progress towards her goals.       Plan: Continue per plan of care.      Precautions: none    Daily Treatment Diary     Manual                                                                                   Exercise Diary              Hip flexor str             Hamstring str 3x10s             Piriformis str 3x10s            LTR 20x5s            SKTC 20x5s            SLR 10x            Sup hip add 3x10 2s            Bridging with ball between knees 3x10            Clamshell with band 3x10 sup   black            Standing hip abduction 10x2            Standing hip extension 10x2                                      Bike 15  min                                                                                                           Modalities                           Moist heat

## 2025-01-07 ENCOUNTER — OFFICE VISIT (OUTPATIENT)
Dept: PHYSICAL THERAPY | Facility: MEDICAL CENTER | Age: OVER 89
End: 2025-01-07
Payer: COMMERCIAL

## 2025-01-07 ENCOUNTER — EVALUATION (OUTPATIENT)
Dept: PHYSICAL THERAPY | Facility: MEDICAL CENTER | Age: OVER 89
End: 2025-01-07
Payer: COMMERCIAL

## 2025-01-07 ENCOUNTER — RA CDI HCC (OUTPATIENT)
Dept: OTHER | Facility: HOSPITAL | Age: OVER 89
End: 2025-01-07

## 2025-01-07 DIAGNOSIS — M48.062 LUMBAR STENOSIS WITH NEUROGENIC CLAUDICATION: Primary | ICD-10-CM

## 2025-01-07 DIAGNOSIS — M81.0 OSTEOPOROSIS, UNSPECIFIED OSTEOPOROSIS TYPE, UNSPECIFIED PATHOLOGICAL FRACTURE PRESENCE: ICD-10-CM

## 2025-01-07 DIAGNOSIS — M65.30 TRIGGER FINGER OF RIGHT HAND, UNSPECIFIED FINGER: Primary | ICD-10-CM

## 2025-01-07 DIAGNOSIS — M51.26 HERNIATION OF LUMBAR INTERVERTEBRAL DISC WITHOUT MYELOPATHY: ICD-10-CM

## 2025-01-07 DIAGNOSIS — R26.2 DISABILITY OF WALKING: ICD-10-CM

## 2025-01-07 PROCEDURE — 97140 MANUAL THERAPY 1/> REGIONS: CPT | Performed by: PHYSICAL THERAPIST

## 2025-01-07 PROCEDURE — 97110 THERAPEUTIC EXERCISES: CPT | Performed by: PHYSICAL THERAPIST

## 2025-01-07 PROCEDURE — 97161 PT EVAL LOW COMPLEX 20 MIN: CPT | Performed by: PHYSICAL THERAPIST

## 2025-01-07 NOTE — PROGRESS NOTES
Daily Note     Today's date: 2025  Patient name: Nilsa Morales  : 2/3/1934  MRN: [id number]  Referring provider: Danii Aceves DO  Dx:   Encounter Diagnosis     ICD-10-CM    1. Lumbar stenosis with neurogenic claudication  M48.062                      Subjective: Pt reports that she's feeling ok, but still has discomfort and a feeling of weakness in her legs.  Feels as though she is making some good progress with her strength.       Objective: See treatment diary below      Assessment: Tolerated treatment well. Patient demonstrated fatigue post treatment, exhibited good technique with therapeutic exercises, and would benefit from continued PT  Pt is making steady progress towards her goals.       Plan: Continue per plan of care.      Precautions: none    Daily Treatment Diary     Manual                                                                                   Exercise Diary              Hip flexor str             Hamstring str 3x10s             Piriformis str 3x10s            LTR 20x5s            SKTC 20x5s            SLR 10x            Sup hip add 3x10 2s            Bridging with ball between knees 3x10            Clamshell with band 3x10 sup   black            Standing hip abduction 10x2            Standing hip extension 10x2                                      Bike 15  min                                                                                                           Modalities                           Moist heat

## 2025-01-07 NOTE — PROGRESS NOTES
PT Evaluation     Today's date: 2025  Patient name: Nilsa Morales  : 2/3/1934  MRN: [id number]  Referring provider: Danii Aceves DO  Dx:   Encounter Diagnosis     ICD-10-CM    1. Trigger finger of right hand, unspecified finger  M65.30 Ambulatory Referral to Physical Therapy      2. Disability of walking  R26.2 Ambulatory Referral to Physical Therapy      3. 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 Osteoporosis, unspecified osteoporosis type, unspecified pathological fracture presence  M81.0 Ambulatory Referral to Physical Therapy                     Assessment  Impairments: abnormal or restricted ROM, activity intolerance, impaired physical strength, lacks appropriate home exercise program and pain with function  Symptom irritability: moderate    Assessment details: Pt is a 89 year old RHD female who presents to PT with R MF trigger finger that started about 2 months ago. Pt presents with tenderness to A1 pulley. She has mild stiffness at each joint in her MF along with intrinsic tightness. Pt moves with caution when actively forming a fist. She has able to move in both isolated IP flexion and MCP flexion without triggering but even with gentle glide able to feel thickness of tendon at A1 pulley. Fabricated custom MCP blocking orthosis to help reduce her triggering, reduce length of gliding,optimize functioning. Pt may benefit from skilled PT to help reduce inflammation and pain, reduce triggering in MF, and better her overall functioning. Thank you kindly for your referral.   Understanding of Dx/Px/POC: good     Prognosis: good    Goals  Goals  1: pt compliant with HEP  2: pt able to tolerate orthosis  3: reduce tenderness to A1 pulley by 25%  4: Pt able to move through full flexion without triggering    Plan  Patient would benefit from: skilled physical therapy  Planned modality interventions: thermotherapy: hydrocollator  packs    Planned therapy interventions: manual therapy, massage, IASTM, orthotic fitting/training, orthotic management and training, stretching, therapeutic exercise, therapeutic activities and flexibility    Frequency: 1x week  Duration in weeks: 4  Plan of Care beginning date: 1/7/2025  Plan of Care expiration date: 2/4/2025  Treatment plan discussed with: patient  Plan details: Initiate PT as per POC        Subjective Evaluation    History of Present Illness  Mechanism of injury: Pt reports her trigger finger symptoms  in R MF started about 2 months ago  Has been having a really hard time opening things  Also very painful when having to pry her finger open once it gets stuck  Denies N/T  Pain is located along volar MCP joint and along dorsal PIP and DIP joint            Not a recurrent problem   Quality of life: good    Patient Goals  Patient goals for therapy: decreased pain, increased motion and independence with ADLs/IADLs    Pain  At worst pain rating: 10  Location: volar MCP joint, dorsal PIP joint  Quality: tight, sharp, discomfort and dull ache  Relieving factors: rest, support and relaxation  Exacerbated by: gripping.  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    Tenderness     Additional Tenderness Details  A1 pulley tenderness    Active Range of Motion     Right Digits   Flexion   Middle     MCP: 70    PIP: 60    DIP: 20  Extension   Middle     MCP: 0    PIP: 0    DIP: 0    Additional Active Range of Motion Details  No active triggering with isolated IP flexion or isolated MCP flexion  + triggering with full comp flexion, pt moves with caution  Fabricated MCP blocking orthosis, pt had mild puling along lateral PIP joint but resolved after some movement in orthosis    Passive Range of Motion     Right Digits    Flexion   Middle     MCP: 90    PIP: 80    DIP: 65             Precautions: Hx of CVA, HTN, lumbar spondylosis      Manuals             MH R hand             STM/IASTM             PROM                          Neuro Re-Ed                                                                                                        Ther Ex             TGE's                                                                                                        Ther Activity                                       Gait Training                                       Modalities

## 2025-01-08 ENCOUNTER — TELEPHONE (OUTPATIENT)
Dept: HEMATOLOGY ONCOLOGY | Facility: CLINIC | Age: OVER 89
End: 2025-01-08

## 2025-01-08 ENCOUNTER — OFFICE VISIT (OUTPATIENT)
Dept: HEMATOLOGY ONCOLOGY | Facility: CLINIC | Age: OVER 89
End: 2025-01-08
Payer: COMMERCIAL

## 2025-01-08 VITALS
RESPIRATION RATE: 18 BRPM | HEART RATE: 65 BPM | DIASTOLIC BLOOD PRESSURE: 82 MMHG | OXYGEN SATURATION: 97 % | SYSTOLIC BLOOD PRESSURE: 180 MMHG | WEIGHT: 125 LBS | BODY MASS INDEX: 23 KG/M2 | HEIGHT: 62 IN | TEMPERATURE: 97.5 F

## 2025-01-08 DIAGNOSIS — D47.3 ESSENTIAL (HEMORRHAGIC) THROMBOCYTHEMIA (HCC): ICD-10-CM

## 2025-01-08 DIAGNOSIS — D64.9 ANEMIA, UNSPECIFIED TYPE: Primary | ICD-10-CM

## 2025-01-08 DIAGNOSIS — D75.839 THROMBOCYTOSIS: ICD-10-CM

## 2025-01-08 DIAGNOSIS — D53.9 NUTRITIONAL ANEMIA, UNSPECIFIED: ICD-10-CM

## 2025-01-08 DIAGNOSIS — N18.30 STAGE 3 CHRONIC KIDNEY DISEASE, UNSPECIFIED WHETHER STAGE 3A OR 3B CKD (HCC): ICD-10-CM

## 2025-01-08 PROCEDURE — 99204 OFFICE O/P NEW MOD 45 MIN: CPT | Performed by: PHYSICIAN ASSISTANT

## 2025-01-08 NOTE — TELEPHONE ENCOUNTER
Left msg for pt regarding next appt with provider... Tues 1/28/25 @ 4 PM.  Provided [facility] phone# [phone] if date and time do not work and would have to reschedule

## 2025-01-08 NOTE — PROGRESS NOTES
Name: Nilsa Morales      : 2/3/1934      MRN: [id number]  Encounter Provider: Umu Dinero PA-C  Encounter Date: 2025   Encounter department: [facility]  :  Assessment & Plan  Thrombocytosis  JAK2 testing has been authorized as was ordered by PCP, she will be going tomorrow to have this drawn  I will also add on the BCR ABL testing to be completed at the same time  We reviewed that her platelet count has been abnormally high for some time and is worsening, there is favored for underlying bone marrow disorder  If blood testing does not provide diagnosis, she will need a bone marrow biopsy.  This procedure was reviewed in detail with she and her family who accompanied her, daughter and niece  Orders:    Ambulatory Referral to Hematology / Oncology    BCR/ABL, PCR; Future    Ambulatory Referral to Oncology Social Worker; Future    Essential (hemorrhagic) thrombocythemia (HCC)    Orders:    BCR/ABL, PCR; Future    Ambulatory Referral to Oncology Social Worker; Future    Anemia, unspecified type  B12 is slightly borderline, she states she is taking B12  Will evaluate the other below causes of anemia and continue further planning after these are resulted and reviewed  Anemia likely is also related to the above undiagnosed bone marrow disorder as well as her chronic kidney disease being contributory  Orders:    Folate; Future    Reticulocytes; Future    TSH, 3rd generation with Free T4 reflex; Future    C-reactive protein; Future    BCR/ABL, PCR; Future    Protein electrophoresis, serum; Future    Ambulatory Referral to Oncology Social Worker; Future    Nutritional anemia, unspecified    Orders:    Folate; Future    Stage 3 chronic kidney disease, unspecified whether stage 3a or 3b CKD (HCC)  Lab Results   Component Value Date    EGFR 37 2025    EGFR 46 (L) 2024    EGFR 47 10/12/2023    CREATININE 1.25 2025    CREATININE 1.14 (H) 2024     CREATININE 1.05 10/12/2023            Social issues:  At the conclusion of the visit patient's daughter Yasmine asked to speak with me alone.  She reviewed concerns of the patient not eating sufficiently.  The family members tried to bring them food, takes grocery shopping, just appears that she is not eating correctly.  Subsequently she is not feeding her  adequately.  He is losing weight she stated and is 109 pounds.  Daughter states the  does have an appetite and when he is taken alone out for lunch he eats very well and a lot.  Reviewed that with these concerns I do need to place a referral to social work to contact [facility] for assessment.  Patient's daughter stated they have tried to move them to an assisted living and at first it seemed like it was going to be successful and then the patient canceled the deposit and did not move into [facility] as planned.    Patient also has uncontrolled hypertension.  Medically I do think it is safer for patient to begin a more controlled environment for medication dispensing as well as eating.    In regards to medication dispensing, patient's niece does fill the try of medication for the week however patient's daughter is concerned that possibly the patient is not taking the pills and throwing them out instead.        History of Present Illness   Chief Complaint   Patient presents with    Consult     Pertinent Medical History   01/08/25: Patient presents for consultation visit regarding thrombocytosis as well as anemia.    PCP begun workup.  Iron panel is normal; B12 479    Flowsheet below of years of the anemia and thrombocytosis    Patient admits that she does not like seeing doctors often.  She follows with primary care regularly.  She was seeing cardiology once a year but missed that appointment in December and wishes to [facility].  Additionally she has history of CVA in 2020, cardio embolic, record review.  She is on aspirin  "and statin related to this.    Her sister has history of pancreatic cancer and passed away when she was in her 50s.  She and family are unsure about any history of BRCA and her sister however the patient's niece, her sisters daughter, had breast cancer and tested negative for BRCA       Review of Systems   Constitutional:  Positive for appetite change (not new but just is not that hungry most of the time) and unexpected weight change (family reports 20 lb weight loss in last 1-2 years). Negative for chills, fatigue and fever.   HENT:  Negative for mouth sores and nosebleeds.    Respiratory:  Negative for cough and shortness of breath.    Cardiovascular:  Negative for chest pain, palpitations and leg swelling.   Gastrointestinal:  Negative for abdominal pain, blood in stool, constipation, diarrhea, nausea and vomiting.   Genitourinary:  Negative for difficulty urinating, dysuria and hematuria.   Musculoskeletal:  Positive for arthralgias (sometimes in the hands/feet).   Skin: Negative.    Neurological:  Negative for dizziness, weakness, light-headedness, numbness and headaches.   Hematological: Negative.    Psychiatric/Behavioral: Negative.             Objective   BP (!) 180/82 (BP Location: Left arm, Patient Position: Sitting, Cuff Size: Adult)   Pulse 65   Temp 97.5 °F (36.4 °C) (Temporal)   Resp 18   Ht 5' 2\" (1.575 m)   Wt 56.7 kg (125 lb)   SpO2 97%   BMI 22.86 kg/m²     Physical Exam  Vitals reviewed.   Constitutional:       General: She is not in acute distress.     Appearance: She is well-developed. She is not ill-appearing.   HENT:      Head: Normocephalic and atraumatic.   Eyes:      General: No scleral icterus.     Conjunctiva/sclera: Conjunctivae normal.   Cardiovascular:      Rate and Rhythm: Normal rate and regular rhythm.      Heart sounds: Normal heart sounds. No murmur heard.  Pulmonary:      Effort: Pulmonary effort is normal. No respiratory distress.      Breath sounds: Normal breath sounds. "   Abdominal:      Palpations: Abdomen is soft.      Tenderness: There is no abdominal tenderness.   Musculoskeletal:         General: No tenderness. Normal range of motion.      Cervical back: Normal range of motion and neck supple.      Right lower leg: No edema.      Left lower leg: No edema.   Lymphadenopathy:      Cervical: No cervical adenopathy.      Upper Body:      Right upper body: No supraclavicular or axillary adenopathy.      Left upper body: No supraclavicular or axillary adenopathy.   Skin:     General: Skin is warm and dry.   Neurological:      Mental Status: She is alert.      Cranial Nerves: No cranial nerve deficit.      Comments: Oriented to person, time and place   Reviewed bone marrow procedure at the beginning of the visit.  She did not remember that we reviewed this at the beginning.  She asked again when this was reiterated at the end to explain what this was that she had no idea --some short-term memory loss   Psychiatric:         Mood and Affect: Mood normal.         Behavior: Behavior normal.         Labs: I have reviewed the following labs:  Results for orders placed or performed in visit on 01/02/25   CBC and differential   Result Value Ref Range    WBC 9.97 4.31 - 10.16 Thousand/uL    RBC 2.82 (L) 3.81 - 5.12 Million/uL    Hemoglobin 8.8 (L) 11.5 - 15.4 g/dL    Hematocrit 27.3 (L) 34.8 - 46.1 %    MCV 97 82 - 98 fL    MCH 31.2 26.8 - 34.3 pg    MCHC 32.2 31.4 - 37.4 g/dL    RDW 16.9 (H) 11.6 - 15.1 %    MPV 10.3 8.9 - 12.7 fL    Platelets 845 (H) 149 - 390 Thousands/uL    nRBC 0 /100 WBCs    Segmented % 58 43 - 75 %    Immature Grans % 0 0 - 2 %    Lymphocytes % 26 14 - 44 %    Monocytes % 11 4 - 12 %    Eosinophils Relative 4 0 - 6 %    Basophils Relative 1 0 - 1 %    Absolute Neutrophils 5.73 1.85 - 7.62 Thousands/µL    Absolute Immature Grans 0.04 0.00 - 0.20 Thousand/uL    Absolute Lymphocytes 2.56 0.60 - 4.47 Thousands/µL    Absolute Monocytes 1.13 0.17 - 1.22 Thousand/µL     Eosinophils Absolute 0.43 0.00 - 0.61 Thousand/µL    Basophils Absolute 0.08 0.00 - 0.10 Thousands/µL   Comprehensive metabolic panel   Result Value Ref Range    Sodium 143 135 - 147 mmol/L    Potassium 4.3 3.5 - 5.3 mmol/L    Chloride 107 96 - 108 mmol/L    CO2 29 21 - 32 mmol/L    ANION GAP 7 4 - 13 mmol/L    BUN 31 (H) 5 - 25 mg/dL    Creatinine 1.25 0.60 - 1.30 mg/dL    Glucose, Fasting 100 (H) 65 - 99 mg/dL    Calcium 9.1 8.4 - 10.2 mg/dL    AST 20 13 - 39 U/L    ALT 15 7 - 52 U/L    Alkaline Phosphatase 71 34 - 104 U/L    Total Protein 6.5 6.4 - 8.4 g/dL    Albumin 4.1 3.5 - 5.0 g/dL    Total Bilirubin 0.46 0.20 - 1.00 mg/dL    eGFR 37 ml/min/1.73sq m   Lipid panel   Result Value Ref Range    Cholesterol 130 See Comment mg/dL    Triglycerides 132 See Comment mg/dL    HDL, Direct 44 (L) >=50 mg/dL    LDL Calculated 60 0 - 100 mg/dL    Non-HDL-Chol (CHOL-HDL) 86 mg/dl   Vitamin B12   Result Value Ref Range    Vitamin B-12 479 180 - 914 pg/mL   TIBC Panel (incl. Iron, TIBC, % Iron Saturation)   Result Value Ref Range    Iron Saturation 19 15 - 50 %    TIBC 306.6 250 - 450 ug/dL    Iron 58 50 - 212 ug/dL    Transferrin 219 203 - 362 mg/dL    UIBC 249 155 - 355 ug/dL   Result Value Ref Range    Ferritin 78 11 - 307 ng/mL                       Administrative Statements   I have spent a total time of 60 minutes in caring for this patient on the day of the visit/encounter including Diagnostic results, Instructions for management, Patient and family education, Importance of tx compliance, Impressions, Counseling / Coordination of care, Documenting in the medical record, Reviewing / ordering tests, medicine, procedures  , Obtaining or reviewing history  , and Communicating with other healthcare professionals .

## 2025-01-08 NOTE — ASSESSMENT & PLAN NOTE
Lab Results   Component Value Date    EGFR 37 01/02/2025    EGFR 46 (L) 05/31/2024    EGFR 47 10/12/2023    CREATININE 1.25 01/02/2025    CREATININE 1.14 (H) 05/31/2024    CREATININE 1.05 10/12/2023            Social issues:

## 2025-01-08 NOTE — PROGRESS NOTES
HCC coding opportunities          Chart Reviewed number of suggestions sent to Provider: 2  N18.30  Recommend adding I12.9- combination code - hypertensive renal disease     Patients Insurance     Medicare Insurance: [employer] Advantage

## 2025-01-08 NOTE — ASSESSMENT & PLAN NOTE
B12 is slightly borderline, she states she is taking B12  Will evaluate the other below causes of anemia and continue further planning after these are resulted and reviewed  Anemia likely is also related to the above undiagnosed bone marrow disorder as well as her chronic kidney disease being contributory  Orders:    Folate; Future    Reticulocytes; Future    TSH, 3rd generation with Free T4 reflex; Future    C-reactive protein; Future    BCR/ABL, PCR; Future    Protein electrophoresis, serum; Future    Ambulatory Referral to Oncology Social Worker; Future

## 2025-01-08 NOTE — TELEPHONE ENCOUNTER
Due to concerns at the home reported by patient's daughter, regarding specifically concerns of restricting food for patient  as well as for herself, medication mis- management (which may be true with her uncontrolled BP) -- a report was sent into [facility] today. I spoke with [facility] staff member with details.

## 2025-01-08 NOTE — ASSESSMENT & PLAN NOTE
JAK2 testing has been authorized as was ordered by PCP, she will be going tomorrow to have this drawn  I will also add on the BCR ABL testing to be completed at the same time  We reviewed that her platelet count has been abnormally high for some time and is worsening, there is favored for underlying bone marrow disorder  If blood testing does not provide diagnosis, she will need a bone marrow biopsy.  This procedure was reviewed in detail with she and her family who accompanied her, daughter and niece  Orders:    Ambulatory Referral to Hematology / Oncology    BCR/ABL, PCR; Future    Ambulatory Referral to Oncology Social Worker; Future

## 2025-01-09 ENCOUNTER — TELEPHONE (OUTPATIENT)
Age: OVER 89
End: 2025-01-09

## 2025-01-09 ENCOUNTER — PATIENT MESSAGE (OUTPATIENT)
Dept: FAMILY MEDICINE CLINIC | Facility: CLINIC | Age: OVER 89
End: 2025-01-09

## 2025-01-09 PROBLEM — I50.33 ACUTE ON CHRONIC DIASTOLIC (CONGESTIVE) HEART FAILURE (HCC): Status: ACTIVE | Noted: 2025-01-01

## 2025-01-09 PROBLEM — Z86.79 HISTORY OF PAROXYSMAL ATRIAL TACHYCARDIA: Status: ACTIVE | Noted: 2025-01-01

## 2025-01-09 NOTE — TELEPHONE ENCOUNTER
Pts son vicki needs POA scanned in to moms chart. He lives in [facility]. He was trying to upload them through [employer], but was unsuccessful. Melo transferred to Mayela who melo transferred to coordinator Donna

## 2025-01-09 NOTE — ASSESSMENT & PLAN NOTE
Currently being worked up outpatient by hematology oncology    Recent Labs     01/09/25  0749   *

## 2025-01-09 NOTE — ASSESSMENT & PLAN NOTE
Wt Readings from Last 3 Encounters:   01/09/25 61.2 kg (135 lb)   01/08/25 56.7 kg (125 lb)   10/23/24 56.3 kg (124 lb 3.2 oz)     89 year old female presented to our institution due to shortness of breath. Clinical presentation suggestive of acute on chronic diastolic heart failure.  Continue lasix 40mg IV BID  Daily weights, I/OS  Cardiology evaluation  Echocardiogram  Telemetry

## 2025-01-09 NOTE — PLAN OF CARE
Problem: Potential for Falls  Goal: Patient will remain free of falls  Description: INTERVENTIONS:  - Educate patient/family on patient safety including physical limitations  - Instruct patient to call for assistance with activity   - Consult OT/PT to assist with strengthening/mobility   - Keep Call bell within reach  - Keep bed low and locked with side rails adjusted as appropriate  - Keep care items and personal belongings within reach  - Initiate and maintain comfort rounds  - Make Fall Risk Sign visible to staff  - Offer Toileting every 2 Hours, in advance of need  - Initiate/Maintain bed alarm  - Obtain necessary fall risk management equipment  - Apply yellow socks and bracelet for high fall risk patients  - Consider moving patient to room near nurses station  Outcome: Progressing     Problem: PAIN - ADULT  Goal: Verbalizes/displays adequate comfort level or baseline comfort level  Description: Interventions:  - Encourage patient to monitor pain and request assistance  - Assess pain using appropriate pain scale  - Administer analgesics based on type and severity of pain and evaluate response  - Implement non-pharmacological measures as appropriate and evaluate response  - Consider cultural and social influences on pain and pain management  - Notify physician/advanced practitioner if interventions unsuccessful or patient reports new pain  Outcome: Progressing     Problem: DISCHARGE PLANNING  Goal: Discharge to home or other facility with appropriate resources  Description: INTERVENTIONS:  - Identify barriers to discharge w/patient and caregiver  - Arrange for needed discharge resources and transportation as appropriate  - Identify discharge learning needs (meds, wound care, etc.)  - Arrange for interpretive services to assist at discharge as needed  - Refer to Case Management Department for coordinating discharge planning if the patient needs post-hospital services based on physician/advanced practitioner  order or complex needs related to functional status, cognitive ability, or social support system  Outcome: Progressing     Problem: Knowledge Deficit  Goal: Patient/family/caregiver demonstrates understanding of disease process, treatment plan, medications, and discharge instructions  Description: Complete learning assessment and assess knowledge base.  Interventions:  - Provide teaching at level of understanding  - Provide teaching via preferred learning methods  Outcome: Progressing     Problem: RESPIRATORY - ADULT  Goal: Achieves optimal ventilation and oxygenation  Description: INTERVENTIONS:  - Assess for changes in respiratory status  - Assess for changes in mentation and behavior  - Position to facilitate oxygenation and minimize respiratory effort  - Oxygen administered by appropriate delivery if ordered  - Initiate smoking cessation education as indicated  - Encourage broncho-pulmonary hygiene including cough, deep breathe, Incentive Spirometry  - Assess the need for suctioning and aspirate as needed  - Assess and instruct to report SOB or any respiratory difficulty  - Respiratory Therapy support as indicated  Outcome: Progressing     Problem: SKIN/TISSUE INTEGRITY - ADULT  Goal: Skin Integrity remains intact(Skin Breakdown Prevention)  Description: Assess:  -Perform Freddy assessment every shift  -Clean and moisturize skin every 2 hours and PRN  -Inspect skin when repositioning, toileting, and assisting with ADLS  -Assess under medical devices  -Assess extremities for adequate circulation and sensation     Bed Management:  -Have minimal linens on bed & keep smooth, unwrinkled  -Change linens as needed when moist or perspiring  -Avoid sitting or lying in one position for more than 2 hours while in bed  -Keep HOB at 30 degrees     Toileting:  -Offer bedside commode  -Assess for incontinence every 2 hours and PRN  -Use incontinent care products after each incontinent episode    Activity:  -Mobilize patient 3  times a day  -Encourage activity and walks on unit  -Encourage or provide ROM exercises   -Turn and reposition patient every 2 Hours  -Use appropriate equipment to lift or move patient in bed  -Instruct/ Assist with weight shifting every 2 hours when out of bed in chair  -Consider limitation of chair time 2 hour intervals    Skin Care:  -Avoid use of baby powder, tape, friction and shearing, hot water or constrictive clothing  -Relieve pressure over bony prominences   -Do not massage red bony areas    Next Steps:  -Teach patient strategies to minimize risks    -Consider consults to  interdisciplinary teams  Outcome: Progressing     Problem: MUSCULOSKELETAL - ADULT  Goal: Maintain or return mobility to safest level of function  Description: INTERVENTIONS:  - Assess patient's ability to carry out ADLs; assess patient's baseline for ADL function and identify physical deficits which impact ability to perform ADLs (bathing, care of mouth/teeth, toileting, grooming, dressing, etc.)  - Assess/evaluate cause of self-care deficits   - Assess range of motion  - Assess patient's mobility  - Assess patient's need for assistive devices and provide as appropriate  - Encourage maximum independence but intervene and supervise when necessary  - Involve family in performance of ADLs  - Assess for home care needs following discharge   - Consider OT consult to assist with ADL evaluation and planning for discharge  - Provide patient education as appropriate  Outcome: Progressing  Goal: Maintain proper alignment of affected body part  Description: INTERVENTIONS:  - Support, maintain and protect limb and body alignment  - Provide patient/ family with appropriate education  Outcome: Progressing

## 2025-01-09 NOTE — H&P
H&P - Hospitalist   Name: Nilsa Morales 89 y.o. female I MRN: [id number]  Unit/Bed#: ED-29 I Date of Admission: 1/9/2025   Date of Service: 1/9/2025 I Hospital Day: 0     Assessment & Plan  Acute on chronic diastolic (congestive) heart failure (HCC)  Wt Readings from Last 3 Encounters:   01/09/25 61.2 kg (135 lb)   01/08/25 56.7 kg (125 lb)   10/23/24 56.3 kg (124 lb 3.2 oz)     89 year old female presented to our institution due to shortness of breath. Clinical presentation suggestive of acute on chronic diastolic heart failure.  Continue lasix 40mg IV BID  Daily weights, I/OS  Cardiology evaluation  Echocardiogram  Telemetry    Anemia  Workup in progress outpatient    Results from last 7 days   Lab Units 01/09/25  0749   HEMOGLOBIN g/dL 9.0*   MCV fL 94   RDW % 16.9*     Essential hypertension  Uncontrolled on admission  Improved with med management. Amlodipine held by cardiology. Continue metoprolol, and lasix  Lumbar stenosis with neurogenic claudication    Pericardial effusion  History of small pericardial effusion  History of stroke, 2020    Stage 3 chronic kidney disease, unspecified whether stage 3a or 3b CKD (HCC)    Recent Labs     01/09/25  0751   BUN 27*   CREATININE 1.13   EGFR 42     Stable, does not meet NOLBERTO criteria      History of paroxysmal atrial tachycardia  Continue Metoprolol  Idiopathic thrombocythemia (HCC)  Currently being worked up outpatient by hematology oncology    Recent Labs     01/09/25  0749   *           VTE Pharmacologic Prophylaxis:   Moderate Risk (Score 3-4) - Pharmacological DVT Prophylaxis Ordered: enoxaparin (Lovenox).  Code Status: No Order dnr/dni  Discussion with family: family at bedside    Anticipated Length of Stay: Patient will be admitted on an inpatient basis with an anticipated length of stay of greater than 2 midnights secondary to iv diuretics, echo, cards reeval.    History of Present Illness   Chief Complaint: shortness of breath    Nilsa Morales is a 89  y.o. female with a PMH of PAT, CKD, stroke, pericardial effusion, and hypertension who presents with shortness of breath.    Patient reports that she started developing lower extremity edema over the last week. No other associated symptoms were noted besides dyspnea on exertion. Earlier today, she woke up with shortness of breath. Her family member called 911 and had her taken to the hospital for further evaluation. She appeared volume overloaded given her edema, elevated blood pressure, and imaging findings. She had clinical improvement with the lasix so far. She will be admitted for suspected CHF.    Review of Systems   Constitutional:  Negative for chills and fever.   Respiratory:  Positive for shortness of breath. Negative for cough and wheezing.    Cardiovascular:  Positive for leg swelling. Negative for chest pain and palpitations.   Gastrointestinal:  Negative for abdominal pain, diarrhea, nausea and vomiting.   Skin:  Negative for color change and pallor.   Neurological:  Positive for weakness. Negative for headaches.   Psychiatric/Behavioral:  Negative for confusion.        Historical Information   Past Medical History:   Diagnosis Date    Hypercholesteremia     Stroke (HCC)      Past Surgical History:   Procedure Laterality Date    ADENOIDECTOMY      APPENDECTOMY      HYSTERECTOMY      TONSILLECTOMY       Social History     Tobacco Use    Smoking status: Never    Smokeless tobacco: Never   Vaping Use    Vaping status: Never Used   Substance and Sexual Activity    Alcohol use: Never    Drug use: Never    Sexual activity: Not on file     E-Cigarette/Vaping    E-Cigarette Use Never User      E-Cigarette/Vaping Substances     History reviewed. No pertinent family history.  Social History:  Marital Status: /Civil Union     Meds/Allergies   I have reviewed home medications with patient family member.  Prior to Admission medications    Medication Sig Start Date End Date Taking? Authorizing Provider    amLODIPine (NORVASC) 2.5 mg tablet Take 1 tablet (2.5 mg total) by mouth daily 10/23/24   Danii Aceves DO   ammonium lactate (LAC-HYDRIN) 12 % lotion Apply topically 2 (two) times a day as needed for dry skin 11/12/24   Danii Aceves DO   ascorbic acid (VITAMIN C) 1000 MG tablet  4/7/10   Historical Provider, MD   aspirin (Aspirin 81) 81 mg chewable tablet  1/28/22   Historical Provider, MD   atorvastatin (LIPITOR) 40 mg tablet Take 40 mg by mouth every evening 8/12/21   Historical Provider, MD   betamethasone dipropionate (DIPROSONE) 0.05 % cream Apply topically 2 (two) times a day To affected rash for up to 14 days; avoid face/genitals 10/23/24   Danii Aceves DO   Cholecalciferol (Vitamin D3) 1.25 MG (76248 UT) CAPS Take 1 tablet by mouth daily    Historical Provider, MD   desoximetasone (TOPICORT) 0.25 % cream Apply topically to skin as needed. 7/14/21   Historical Provider, MD   metoprolol succinate (TOPROL-XL) 25 mg 24 hr tablet Take 1 tablet (25 mg total) by mouth daily 10/23/24   Danii Aceves DO   Multiple Vitamins-Minerals (ICAPS AREDS 2 PO)     Historical Provider, MD     Allergies   Allergen Reactions    Codeine Nausea Only and GI Intolerance    Erythromycin Nausea Only and GI Intolerance    Amoxicillin Rash    Azithromycin Rash    Erythromycin Base Rash    Metoprolol Rash    Other Rash    Oxycodone Rash    Oxycodone-Acetaminophen Rash       Objective :  Temp:  [98.3 °F (36.8 °C)] 98.3 °F (36.8 °C)  HR:  [] 94  BP: (148-226)/() 148/67  Resp:  [20-24] 22  SpO2:  [95 %-98 %] 97 %  O2 Device: Nasal cannula    Physical Exam  Vitals reviewed.   Constitutional:       General: She is not in acute distress.  HENT:      Head: Normocephalic.      Nose: Nose normal.      Mouth/Throat:      Mouth: Mucous membranes are moist.   Eyes:      General: No scleral icterus.  Neck:      Comments: JVD  Cardiovascular:      Rate and Rhythm: Normal rate and regular rhythm.      Heart sounds:  Murmur heard.   Pulmonary:      Effort: Pulmonary effort is normal. No respiratory distress.      Breath sounds: Decreased breath sounds present. No wheezing or rales.   Abdominal:      General: There is no distension.      Palpations: Abdomen is soft.      Tenderness: There is no abdominal tenderness.   Musculoskeletal:      Right lower leg: Edema present.      Left lower leg: Edema present.   Skin:     General: Skin is warm.   Neurological:      Mental Status: She is alert and oriented to person, place, and time.   Psychiatric:         Mood and Affect: Mood normal.         Behavior: Behavior normal.     Lines/Drains:            Lab Results: I have reviewed the following results:  Results from last 7 days   Lab Units 01/09/25  0749   WBC Thousand/uL 13.49*   HEMOGLOBIN g/dL 9.0*   HEMATOCRIT % 28.3*   PLATELETS Thousands/uL 839*   SEGS PCT % 76*   LYMPHO PCT % 13*   MONO PCT % 8   EOS PCT % 2     Results from last 7 days   Lab Units 01/09/25  0751   SODIUM mmol/L 139   POTASSIUM mmol/L 4.0   CHLORIDE mmol/L 105   CO2 mmol/L 26   BUN mg/dL 27*   CREATININE mg/dL 1.13   ANION GAP mmol/L 8   CALCIUM mg/dL 8.7   ALBUMIN g/dL 4.1   TOTAL BILIRUBIN mg/dL 0.46   ALK PHOS U/L 70   ALT U/L 17   AST U/L 24   GLUCOSE RANDOM mg/dL 129             Lab Results   Component Value Date    HGBA1C 5.7 (H) 05/25/2020           XR Chest, ecg reviewed      ** Please Note: This note has been constructed using a voice recognition system. **

## 2025-01-09 NOTE — ASSESSMENT & PLAN NOTE
Neurohormonal Blockade:  --Beta Blocker: Toprol 25 mg QD  --ARNi / ACEi / ARB: None  --Aldosterone Antagonist: None  --SGLT2 Inhibitor: None  --Home Diuretic: None  --Inpatient Diuretic: IV Lasix 40 mg BID for goal 600 cc UOP by this afternoon. If she does not reach her UOP, would increase to 60 mg.    Prior dry weight ~ 120#.  Update echo  OOB daily

## 2025-01-09 NOTE — CONSULTS
Consultation - Cardiology   Name: Nilsa Morales 89 y.o. female I MRN: [id number]  Unit/Bed#: E5 -01 I Date of Admission: 1/9/2025   Date of Service: 1/9/2025 I Hospital Day: 0   Consult to cardiology  Consult performed by: Massiel Santana PA-C  Consult ordered by: Alejandra Reynoso DO      Physician Requesting Evaluation: Sanket Forte MD   Reason for Evaluation / Principal Problem: Pulmonary edema  Assessment & Plan  Acute on chronic diastolic (congestive) heart failure (HCC)  Neurohormonal Blockade:  --Beta Blocker: Toprol 25 mg QD  --ARNi / ACEi / ARB: None  --Aldosterone Antagonist: None  --SGLT2 Inhibitor: None  --Home Diuretic: None  --Inpatient Diuretic: IV Lasix 40 mg BID for goal 600 cc UOP by this afternoon. If she does not reach her UOP, would increase to 60 mg.    Prior dry weight ~ 120#.  Update echo  OOB daily  History of paroxysmal atrial tachycardia  On June 2020 ambulatory cardiac event monitor  On BB  Telemetry monitoring  Anemia  At baseline  Essential hypertension  Chronic longstanding uncontrolled HTN  Daughter worried that patient doesn't take her home antihypertensives - Toprol 25 mg QD & Norvasc 2.5 mg QD  Continue Toprol  Hold Norvasc  Pericardial effusion  Noted on echo 2022 - small pericardial effusion adjacent to RA  Update echo  History of stroke, 2020  Thought to be possibly cardioembolic but patient declined Loop recorder in the past and A-Fib was never found so Eliquis was dc'd in the past  Continue home ASA & Lipitor  Stage 3 chronic kidney disease, unspecified whether stage 3a or 3b CKD (HCC)  Monitor BMP with IV diuresis    Lumbar stenosis  Thrombocytosis  Bronchitis    History of Present Illness   Nilsa Morales is a 89 y.o. female who presented to [facility] ED  via EMS from home c/o coughing with SOB since 3 AM this morning.  Patient reports gradual onset bilateral peripheral edema for weeks.  Daughter is afraid she does not take her pills at home.  What brought her to the emergency  room was last night she woke from sleep, went to the kitchen and got a snack, was able to climb the steps back to her bedroom without shortness of breath.  When she laid down in bed she started with strong coughing which kept her awake for 4 hours until she called her daughter around 6 AM.  Daughter thought the patient sounded unwell and respiratory distress and called 911.  Hypoxic in the emergency room requiring supplemental oxygen by nasal cannula.  Patient denies recent change in her routine, no changes in her medications.  She lives in a 3 story home with her  who is sick.  She helps to care for her  (also visiting nurses come to the home), cleans the home, cooking, sometimes goes out for walks, constantly up and down the flights of steps in their home.  She was out to a doctor's appointment yesterday and denies any problems with chest pain, tightness, squeezing, burning in the chest, lightheadedness, palpitations, racing heartbeat, presyncope, EVANS, wheezing or shortness of breath at that time.    Primary cardiologist: YANICK Martin. Patient wishes to change cardiologists to [facility].    Review of Systems   Constitutional:  Negative for diaphoresis.   Respiratory:  Positive for cough and shortness of breath.    Cardiovascular:  Positive for leg swelling. Negative for chest pain and palpitations.   Gastrointestinal:  Negative for constipation, diarrhea, nausea and vomiting.   Genitourinary:  Negative for difficulty urinating and hematuria.   Allergic/Immunologic:        No recent illness   Neurological:  Negative for dizziness, syncope, weakness and light-headedness.   Psychiatric/Behavioral:  Negative for confusion.      No pertinent surgical history.  Family history: Stroke  Social history: Lives at home with her , daughter and other family members nearby involved.  Does not use device for ambulation.  No smoking, alcohol or drug use.  Family trying to get them  into [facility] soon.    Pertinent medications from prior to admission: Norvasc 2.5 mg daily, aspirin 81 mg daily, Lipitor 40 mg nightly & Toprol 25 mg daily    Objective :  Temp:  [98.3 °F (36.8 °C)] 98.3 °F (36.8 °C)  HR:  [] 94  BP: (132-226)/() 132/69  Resp:  [20-24] 20  SpO2:  [93 %-98 %] 93 %  O2 Device: Nasal cannula    Vitals:    01/09/25 1316 01/09/25 1413   Weight: 61.5 kg (135 lb 9.3 oz) 61.2 kg (135 lb)     Physical Exam  Vitals and nursing note reviewed.   Constitutional:       General: She is not in acute distress.     Appearance: She is ill-appearing.   HENT:      Head: Normocephalic and atraumatic.      Nose: No congestion.      Mouth/Throat:      Mouth: Mucous membranes are moist.   Eyes:      Conjunctiva/sclera: Conjunctivae normal.   Neck:      Comments: + JVD & HJR  Cardiovascular:      Rate and Rhythm: Normal rate and regular rhythm.      Heart sounds: S1 normal and S2 normal. Murmur heard.      Systolic murmur is present with a grade of 3/6.   Pulmonary:      Breath sounds: Examination of the right-lower field reveals decreased breath sounds. Examination of the left-lower field reveals decreased breath sounds. Decreased breath sounds present. No wheezing.      Comments: Increased work of breathing even at rest    Very deep sounding cough observed    She has nasal cannula o2 in place  Abdominal:      General: Abdomen is flat.   Musculoskeletal:      Comments: + bl le edema 2 + to the knees   Skin:     General: Skin is warm and dry.   Neurological:      Mental Status: She is alert and oriented to person, place, and time.   Psychiatric:         Behavior: Behavior normal.     Lab Results: I have reviewed the following results: CBC and CMP results reviewed, , troponins 4-8  Imaging: CXR with cardiomegaly, bilateral pleural effusions versus atelectasis, increased vascular congestion no infiltrate  Other studies: EKG on arrival with sinus rhythm, PACs, first-degree AV node  block, heart rate 86 bpm.  Normal axis, no infarct or ischemia.  Echo-June 2022 moderate LVH, LVEF 60-65%, G2DD, normal RV size and function.  Mildly dilated left atrium.  No significant valvular heart stenosis or regurgitation.  Moderate MAC, small pericardial effusion adjacent to the RA.    Massiel Santana PA-C

## 2025-01-09 NOTE — LETTER
[facility] INTENSIVE CARE UNIT  [address] [address]  Dept: [phone]    January 13, 2025     Patient: Nilsa Morales   YOB: 1934   Date of Visit: 1/9/2025       To Whom it May Concern:    Please excuse Barbara Chung from work tonight 1/13 as she is with her family member Nilsa Morales in the ICU while she remains on comfort care.     If you have any questions or concerns, please don't hesitate to call.         Sincerely,          BETZAIDA Forbes

## 2025-01-09 NOTE — LETTER
[facility] INTENSIVE [facility]ARE UN[facility]  [address] [address]  Dept: [phone]    January 13, 2025     Patient: Nilsa Morales   YOB: 1934   Date of Visit: 1/9/2025       To Whom it May Concern:    Please excuse Barbara Chung from work tonight 1/14 as she is with her family member Nilsa Morales in the ICU while she remains on comfort care.     If you have any questions or concerns, please don't hesitate to call.         Sincerely,          BETZAIDA Forbes

## 2025-01-09 NOTE — ASSESSMENT & PLAN NOTE
Chronic longstanding uncontrolled HTN  Daughter worried that patient doesn't take her home antihypertensives - Toprol 25 mg QD & Norvasc 2.5 mg QD  Continue Toprol  Hold Norvasc

## 2025-01-09 NOTE — ASSESSMENT & PLAN NOTE
Stable, no indication for transfusion at this time    Results from last 7 days   Lab Units 01/09/25  0749   HEMOGLOBIN g/dL 9.0*   MCV fL 94   RDW % 16.9*

## 2025-01-09 NOTE — ASSESSMENT & PLAN NOTE
Thought to be possibly cardioembolic but patient declined Loop recorder in the past and A-Fib was never found so Eliquis was dc'd in the past  Continue home ASA & Lipitor

## 2025-01-09 NOTE — ED PROVIDER NOTES
Time reflects when diagnosis was documented in both MDM as applicable and the Disposition within this note       Time User Action Codes Description Comment    1/9/2025  9:09 AM Alejandra Reynoso Add [J81.1] Pulmonary edema     1/9/2025  9:09 AM Alejandra Reynoso Add [R06.00] Dyspnea     1/9/2025  9:09 AM Alejandra Reynoso Add [R60.0] Bilateral leg edema     1/9/2025  9:10 AM Alejandra Reynoso Add [I10] Elevated blood pressure reading with diagnosis of hypertension           ED Disposition       ED Disposition   Admit    Condition   Stable    Date/Time   u Jan 9, 2025  9:08 AM    Comment   Case was discussed with HAYLEE and the patient's admission status was agreed to be Admission Status: inpatient status to the service of Dr. Forte .               Assessment & Plan       Medical Decision Making  89 yo F presenting for evaluation of cough/SOB, found to have b/l LE edema on exam- EKG to r/o STEMI/dysrhythmia/abn intervals/ischemic changes, troponin to eval for ischemia, CBC to assess for leukocytosis/anemia, CMP to assess for elevated LFTs/NOLBERTO/electrolyte derangements, CXR to r/o PTX/PNA/effusion/CHF, BNP to r/o CHF, COVID/Flu testing, ddimer to r/o PE  Considered edema from Amlodipine    CXR with interstitial edema/small effusions and elevated BNP, so likely new onset CHF or from uncontrolled HTN  Given IV Lasix and admitted for further workup/management     Problems Addressed:  Bilateral leg edema: acute illness or injury  Dyspnea: acute illness or injury  Elevated blood pressure reading with diagnosis of hypertension: acute illness or injury  Pulmonary edema: acute illness or injury    Amount and/or Complexity of Data Reviewed  External Data Reviewed: labs, radiology, ECG and notes.  Labs: ordered. Decision-making details documented in ED Course.  Radiology: ordered and independent interpretation performed. Decision-making details documented in ED Course.  ECG/medicine tests: ordered and independent interpretation performed.  Decision-making details documented in ED Course.    Risk  Prescription drug management.  Decision regarding hospitalization.        ED Course as of 01/09/25 0953   Thu Jan 09, 2025   0752 EKG independently interpreted by myself:  narrow complex irregular rhythm, sinus with premature beats, normal axis, qtc 412, nonspecific st changes, twi III, avF, v3-v5, no previous for comparison   0801 WBC(!): 13.49   0801 Hemoglobin(!): 9.0  Similar to baseline   0801 Platelet Count(!): 839  Similar to prior   0822 CXR independently interpreted by myself: slight haziness at bases, will get CT to better evaluate   0834 hs TnI 0hr: 4   0834 GFR, Calculated: 42   0848 Blood Pressure(!): 226/117  Gave her home meds. BP was 180/82 in office yesterday   0855 BNP(!): 545   0903 Discussed results and plan with pt/daughter at bedside   0923 Blood Pressure(!): 216/93  Will treat with IV meds       Medications   metoprolol succinate (TOPROL-XL) 24 hr tablet 25 mg (25 mg Oral Given 1/9/25 0752)   amLODIPine (NORVASC) tablet 2.5 mg (2.5 mg Oral Given 1/9/25 0752)   furosemide (LASIX) injection 20 mg (20 mg Intravenous Given 1/9/25 0946)   hydrALAZINE (APRESOLINE) injection 5 mg (5 mg Intravenous Given 1/9/25 0944)       ED Risk Strat Scores   HEART Risk Score      Flowsheet Row Most Recent Value   Heart Score Risk Calculator    History 0 Filed at: 01/09/2025 0837   ECG 1 Filed at: 01/09/2025 0837   Age 2 Filed at: 01/09/2025 0837   Risk Factors 2 Filed at: 01/09/2025 0837   Troponin 0 Filed at: 01/09/2025 0837   HEART Score 5 Filed at: 01/09/2025 0837          HEART Risk Score      Flowsheet Row Most Recent Value   Heart Score Risk Calculator    History 0 Filed at: 01/09/2025 0837   ECG 1 Filed at: 01/09/2025 0837   Age 2 Filed at: 01/09/2025 0837   Risk Factors 2 Filed at: 01/09/2025 0837   Troponin 0 Filed at: 01/09/2025 0837   HEART Score 5 Filed at: 01/09/2025 0837                                Vivek' Criteria for PE      Flowsheet Row  Most Recent Value   Wells' Criteria for PE    Clinical signs and symptoms of DVT 0 Filed at: 01/09/2025 0853   PE is primary diagnosis or equally likely 0 Filed at: 01/09/2025 0853   HR >100 0 Filed at: 01/09/2025 0853   Immobilization at least 3 days or Surgery in the previous 4 weeks 0 Filed at: 01/09/2025 0853   Previous, objectively diagnosed PE or DVT 0 Filed at: 01/09/2025 0853   Hemoptysis 0 Filed at: 01/09/2025 0853   Malignancy with treatment within 6 months or palliative 0 Filed at: 01/09/2025 0853   Wells' Criteria Total 0 Filed at: 01/09/2025 0853                        History of Present Illness       Chief Complaint   Patient presents with    Shortness of Breath     Sob and cough starting around 0300. Feels mildly anxious.        Past Medical History:   Diagnosis Date    Hypercholesteremia     Stroke (HCC)       Past Surgical History:   Procedure Laterality Date    ADENOIDECTOMY      APPENDECTOMY      HYSTERECTOMY      TONSILLECTOMY        History reviewed. No pertinent family history.   Social History     Tobacco Use    Smoking status: Never    Smokeless tobacco: Never   Vaping Use    Vaping status: Never Used   Substance Use Topics    Alcohol use: Never    Drug use: Never      E-Cigarette/Vaping    E-Cigarette Use Never User       E-Cigarette/Vaping Substances      I have reviewed and agree with the history as documented.     89 yo F h/o anemia, thrombocytosis, h/o CVA, lumbar stenosis/neurogenic claudication, HLD; presenting for evaluation of cough/SOB.    Sx started in the middle of the night around 3 am. Reports cough, occasionally productive. SOB described as feeling she couldn't catch her breath.   New b/l LE edema  No symptoms yesterday    Denies fevers, chills, CP, abdominal pain, N/V/D/C, back pain  Lives at home with               Per independent review of external records:   - Heme/Onc yesterday  Thrombocytosis  JAK2 testing has been authorized as was ordered by PCP, she will be  going tomorrow to have this drawn  I will also add on the BCR ABL testing to be completed at the same time  We reviewed that her platelet count has been abnormally high for some time and is worsening, there is favored for underlying bone marrow disorder  If blood testing does not provide diagnosis, she will need a bone marrow biopsy.  Anemia, unspecified type  B12 is slightly borderline, she states she is taking B12  Will evaluate the other below causes of anemia and continue further planning after these are resulted and reviewed  Anemia likely is also related to the above undiagnosed bone marrow disorder as well as her chronic kidney disease being contributory    6/20/22 ECHO LVH:  EF 60-65%, grade II diastolic dysfunction    Wt Readings from Last 3 Encounters:   01/09/25 57 kg (125 lb 10.6 oz)   01/08/25 56.7 kg (125 lb)   10/23/24 56.3 kg (124 lb 3.2 oz)        Review of Systems        Objective       ED Triage Vitals   Temperature Pulse Blood Pressure Respirations SpO2 Patient Position - Orthostatic VS   01/09/25 0733 01/09/25 0733 01/09/25 0733 01/09/25 0733 01/09/25 0733 --   98.3 °F (36.8 °C) 94 (!) 226/117 20 95 %       Temp src Heart Rate Source BP Location FiO2 (%) Pain Score    -- 01/09/25 0830 -- -- --     Monitor         Vitals      Date and Time Temp Pulse SpO2 Resp BP Pain Score FACES Pain Rating User   01/09/25 0930 -- 86 98 % 20 189/84 -- -- NORMA   01/09/25 0915 -- 84 98 % 20 216/93 -- -- NORMA   01/09/25 0830 -- 82 98 % 22 207/82 -- -- NOMRA   01/09/25 0733 98.3 °F (36.8 °C) 94 95 % 20 226/117 -- -- NORMA            Physical Exam  Vitals and nursing note reviewed.   Constitutional:       General: She is not in acute distress.     Appearance: Normal appearance. She is well-developed.   HENT:      Head: Normocephalic and atraumatic.      Nose: Nose normal.   Eyes:      Extraocular Movements: Extraocular movements intact.      Conjunctiva/sclera: Conjunctivae normal.   Cardiovascular:      Rate and Rhythm:  Normal rate and regular rhythm.      Heart sounds: Normal heart sounds.   Pulmonary:      Effort: Pulmonary effort is normal. No respiratory distress.      Breath sounds: Normal breath sounds. No stridor. No rales.      Comments: + cough present  Chest:      Chest wall: No tenderness.   Abdominal:      General: There is no distension.      Palpations: Abdomen is soft.      Tenderness: There is no abdominal tenderness. There is no guarding or rebound.   Musculoskeletal:         General: No swelling, tenderness or deformity.      Cervical back: Normal range of motion and neck supple.      Right lower leg: Edema present.      Left lower leg: Edema present.      Comments: B/l 2+ edema, no calf swelling/ttp   Skin:     General: Skin is warm and dry.      Findings: No rash.   Neurological:      General: No focal deficit present.      Mental Status: She is alert and oriented to person, place, and time.      Motor: No abnormal muscle tone.      Coordination: Coordination normal.   Psychiatric:         Thought Content: Thought content normal.         Judgment: Judgment normal.         Results Reviewed       Procedure Component Value Units Date/Time    HS Troponin I 2hr [[id number]] Collected: 01/09/25 0944    Lab Status: In process Specimen: Blood from Arm, Left Updated: 01/09/25 0952    B-Type Natriuretic Peptide(BNP) [[id number]]  (Abnormal) Collected: 01/09/25 0749    Lab Status: Final result Specimen: Blood from Arm, Left Updated: 01/09/25 0855      pg/mL     D-dimer, quantitative [[id number]]  (Abnormal) Collected: 01/09/25 0827    Lab Status: Final result Specimen: Blood from Arm, Left Updated: 01/09/25 0852     D-Dimer, Quant 0.90 ug/ml FEU     Narrative:      In the evaluation for possible pulmonary embolism, in the appropriate (Well's Score of 4 or less) patient, the age adjusted d-dimer cutoff for this patient can be calculated as:    Age x 0.01 (in ug/mL) for Age-adjusted D-dimer exclusion threshold for a  patient over 50 years.    Comprehensive metabolic panel [[id number]]  (Abnormal) Collected: 01/09/25 0751    Lab Status: Final result Specimen: Blood from Arm, Left Updated: 01/09/25 0833     Sodium 139 mmol/L      Potassium 4.0 mmol/L      Chloride 105 mmol/L      CO2 26 mmol/L      ANION GAP 8 mmol/L      BUN 27 mg/dL      Creatinine 1.13 mg/dL      Glucose 129 mg/dL      Calcium 8.7 mg/dL      AST 24 U/L      ALT 17 U/L      Alkaline Phosphatase 70 U/L      Total Protein 6.7 g/dL      Albumin 4.1 g/dL      Total Bilirubin 0.46 mg/dL      eGFR 42 ml/min/1.73sq m     Narrative:      National Kidney Disease Foundation guidelines for Chronic Kidney Disease (CKD):     Stage 1 with normal or high GFR (GFR > 90 mL/min/1.73 square meters)    Stage 2 Mild CKD (GFR = 60-89 mL/min/1.73 square meters)    Stage 3A Moderate CKD (GFR = 45-59 mL/min/1.73 square meters)    Stage 3B Moderate CKD (GFR = 30-44 mL/min/1.73 square meters)    Stage 4 Severe CKD (GFR = 15-29 mL/min/1.73 square meters)    Stage 5 End Stage CKD (GFR <15 mL/min/1.73 square meters)  Note: GFR calculation is accurate only with a steady state creatinine    FLU/COVID Rapid Antigen (30 min. TAT) - Preferred screening test in ED [[id number]]  (Normal) Collected: 01/09/25 0749    Lab Status: Final result Specimen: Nares from Nose Updated: 01/09/25 0823     SARS COV Rapid Antigen Negative     Influenza A Rapid Antigen Negative     Influenza B Rapid Antigen Negative    Narrative:      This test has been performed using the [employer] Louise 2 FLU+SARS Antigen test under the Emergency Use Authorization (EUA). This test has been validated by the  and verified by the performing laboratory. The Louise uses lateral flow immunofluorescent sandwich assay to detect SARS-COV, Influenza A and Influenza B Antigen.     The Quidel Louise 2 SARS Antigen test does not differentiate between SARS-CoV and SARS-CoV-2.     Negative results are presumptive and may be confirmed with  a molecular assay, if necessary, for patient management. Negative results do not rule out SARS-CoV-2 or influenza infection and should not be used as the sole basis for treatment or patient management decisions. A negative test result may occur if the level of antigen in a sample is below the limit of detection of this test.     Positive results are indicative of the presence of viral antigens, but do not rule out bacterial infection or co-infection with other viruses.     All test results should be used as an adjunct to clinical observations and other information available to the provider.    FOR PEDIATRIC PATIENTS - copy/paste COVID Guidelines URL to browser: [email or web address]    HS Troponin 0hr (reflex protocol) [[id number]]  (Normal) Collected: 01/09/25 0751    Lab Status: Final result Specimen: Blood from Arm, Left Updated: 01/09/25 0823     hs TnI 0hr 4 ng/L     HS Troponin I 4hr [[id number]]     Lab Status: No result Specimen: Blood     CBC and differential [[id number]]  (Abnormal) Collected: 01/09/25 0749    Lab Status: Final result Specimen: Blood from Arm, Left Updated: 01/09/25 0758     WBC 13.49 Thousand/uL      RBC 3.00 Million/uL      Hemoglobin 9.0 g/dL      Hematocrit 28.3 %      MCV 94 fL      MCH 30.0 pg      MCHC 31.8 g/dL      RDW 16.9 %      MPV 10.2 fL      Platelets 839 Thousands/uL      nRBC 0 /100 WBCs      Segmented % 76 %      Immature Grans % 0 %      Lymphocytes % 13 %      Monocytes % 8 %      Eosinophils Relative 2 %      Basophils Relative 1 %      Absolute Neutrophils 10.28 Thousands/µL      Absolute Immature Grans 0.06 Thousand/uL      Absolute Lymphocytes 1.68 Thousands/µL      Absolute Monocytes 1.04 Thousand/µL      Eosinophils Absolute 0.33 Thousand/µL      Basophils Absolute 0.10 Thousands/µL             XR chest 2 views   ED Interpretation by Alejandra Reynoso DO (01/09 0852)   FINDINGS:     Interstitial edema. Bilateral  lower lobe compressive atelectasis.  No pneumothorax. Small bilateral pleural effusions.     Normal cardiomediastinal silhouette.     Degenerative disease of the glenohumeral and acromioclavicular joints.     Normal upper abdomen.     IMPRESSION:     Interstitial edema and small effusions.           Final Interpretation by Debbi Forde MD (01/09 0849)      Interstitial edema and small effusions.            Workstation performed: [id number]             Procedures    ED Medication and Procedure Management   Prior to Admission Medications   Prescriptions Last Dose Informant Patient Reported? Taking?   Cholecalciferol (Vitamin D3) 1.25 MG (03375 UT) CAPS  Family Member Yes No   Sig: Take 1 tablet by mouth daily   Multiple Vitamins-Minerals (ICAPS AREDS 2 PO)  Family Member Yes No   amLODIPine (NORVASC) 2.5 mg tablet  Family Member No No   Sig: Take 1 tablet (2.5 mg total) by mouth daily   ammonium lactate (LAC-HYDRIN) 12 % lotion  Family Member No No   Sig: Apply topically 2 (two) times a day as needed for dry skin   ascorbic acid (VITAMIN C) 1000 MG tablet  Family Member Yes No   aspirin (Aspirin 81) 81 mg chewable tablet  Family Member Yes No   atorvastatin (LIPITOR) 40 mg tablet  Family Member Yes No   Sig: Take 40 mg by mouth every evening   betamethasone dipropionate (DIPROSONE) 0.05 % cream  Family Member No No   Sig: Apply topically 2 (two) times a day To affected rash for up to 14 days; avoid face/genitals   desoximetasone (TOPICORT) 0.25 % cream  Family Member Yes No   Sig: Apply topically to skin as needed.   metoprolol succinate (TOPROL-XL) 25 mg 24 hr tablet  Family Member No No   Sig: Take 1 tablet (25 mg total) by mouth daily      Facility-Administered Medications: None     Patient's Medications   Discharge Prescriptions    No medications on file     No discharge procedures on file.  ED SEPSIS DOCUMENTATION   Time reflects when diagnosis was documented in both MDM as applicable and the  Disposition within this note       Time User Action Codes Description Comment    1/9/2025  9:09 AM Alejandra Reynoso [J81.1] Pulmonary edema     1/9/2025  9:09 AM Alejandra Reynoso Add [R06.00] Dyspnea     1/9/2025  9:09 AM Alejandra Reynoso Add [R60.0] Bilateral leg edema     1/9/2025  9:10 AM Alejandra Reynoso [I10] Elevated blood pressure reading with diagnosis of hypertension                  Alejandra Reynoso DO  01/09/25 0953

## 2025-01-09 NOTE — ASSESSMENT & PLAN NOTE
Lab Results   Component Value Date    EGFR 42 01/09/2025    EGFR 37 01/02/2025    EGFR 46 (L) 05/31/2024    CREATININE 1.13 01/09/2025    CREATININE 1.25 01/02/2025    CREATININE 1.14 (H) 05/31/2024     Stable, no evidence of NOLBETRO at this time

## 2025-01-10 PROBLEM — J96.01 ACUTE RESPIRATORY FAILURE WITH HYPOXIA (HCC): Status: ACTIVE | Noted: 2025-01-10

## 2025-01-10 NOTE — PLAN OF CARE
Problem: Potential for Falls  Goal: Patient will remain free of falls  Description: INTERVENTIONS:  - Educate patient/family on patient safety including physical limitations  - Instruct patient to call for assistance with activity   - Consult OT/PT to assist with strengthening/mobility   - Keep Call bell within reach  - Keep bed low and locked with side rails adjusted as appropriate  - Keep care items and personal belongings within reach  - Initiate and maintain comfort rounds  - Make Fall Risk Sign visible to staff  - Offer Toileting every 2 Hours, in advance of need  - Initiate/Maintain bed alarm  - Obtain necessary fall risk management equipment  - Apply yellow socks and bracelet for high fall risk patients  - Consider moving patient to room near nurses station  Outcome: Progressing     Problem: PAIN - ADULT  Goal: Verbalizes/displays adequate comfort level or baseline comfort level  Description: Interventions:  - Encourage patient to monitor pain and request assistance  - Assess pain using appropriate pain scale  - Administer analgesics based on type and severity of pain and evaluate response  - Implement non-pharmacological measures as appropriate and evaluate response  - Consider cultural and social influences on pain and pain management  - Notify physician/advanced practitioner if interventions unsuccessful or patient reports new pain  Outcome: Progressing     Problem: DISCHARGE PLANNING  Goal: Discharge to home or other facility with appropriate resources  Description: INTERVENTIONS:  - Identify barriers to discharge w/patient and caregiver  - Arrange for needed discharge resources and transportation as appropriate  - Identify discharge learning needs (meds, wound care, etc.)  - Arrange for interpretive services to assist at discharge as needed  - Refer to Case Management Department for coordinating discharge planning if the patient needs post-hospital services based on physician/advanced practitioner  order or complex needs related to functional status, cognitive ability, or social support system  Outcome: Progressing     Problem: Knowledge Deficit  Goal: Patient/family/caregiver demonstrates understanding of disease process, treatment plan, medications, and discharge instructions  Description: Complete learning assessment and assess knowledge base.  Interventions:  - Provide teaching at level of understanding  - Provide teaching via preferred learning methods  Outcome: Progressing     Problem: RESPIRATORY - ADULT  Goal: Achieves optimal ventilation and oxygenation  Description: INTERVENTIONS:  - Assess for changes in respiratory status  - Assess for changes in mentation and behavior  - Position to facilitate oxygenation and minimize respiratory effort  - Oxygen administered by appropriate delivery if ordered  - Initiate smoking cessation education as indicated  - Encourage broncho-pulmonary hygiene including cough, deep breathe, Incentive Spirometry  - Assess the need for suctioning and aspirate as needed  - Assess and instruct to report SOB or any respiratory difficulty  - Respiratory Therapy support as indicated  Outcome: Progressing     Problem: SKIN/TISSUE INTEGRITY - ADULT  Goal: Skin Integrity remains intact(Skin Breakdown Prevention)  Description: Assess:  -Perform Freddy assessment every shift  -Clean and moisturize skin every 2 hours and PRN  -Inspect skin when repositioning, toileting, and assisting with ADLS  -Assess under medical devices  -Assess extremities for adequate circulation and sensation     Bed Management:  -Have minimal linens on bed & keep smooth, unwrinkled  -Change linens as needed when moist or perspiring  -Avoid sitting or lying in one position for more than 2 hours while in bed  -Keep HOB at 30 degrees     Toileting:  -Offer bedside commode  -Assess for incontinence every 2 hours and PRN  -Use incontinent care products after each incontinent episode    Activity:  -Mobilize patient 3  times a day  -Encourage activity and walks on unit  -Encourage or provide ROM exercises   -Turn and reposition patient every 2 Hours  -Use appropriate equipment to lift or move patient in bed  -Instruct/ Assist with weight shifting every 2 hours when out of bed in chair  -Consider limitation of chair time 2 hour intervals    Skin Care:  -Avoid use of baby powder, tape, friction and shearing, hot water or constrictive clothing  -Relieve pressure over bony prominences   -Do not massage red bony areas    Next Steps:  -Teach patient strategies to minimize risks    -Consider consults to  interdisciplinary teams  Outcome: Progressing     Problem: MUSCULOSKELETAL - ADULT  Goal: Maintain or return mobility to safest level of function  Description: INTERVENTIONS:  - Assess patient's ability to carry out ADLs; assess patient's baseline for ADL function and identify physical deficits which impact ability to perform ADLs (bathing, care of mouth/teeth, toileting, grooming, dressing, etc.)  - Assess/evaluate cause of self-care deficits   - Assess range of motion  - Assess patient's mobility  - Assess patient's need for assistive devices and provide as appropriate  - Encourage maximum independence but intervene and supervise when necessary  - Involve family in performance of ADLs  - Assess for home care needs following discharge   - Consider OT consult to assist with ADL evaluation and planning for discharge  - Provide patient education as appropriate  Outcome: Progressing  Goal: Maintain proper alignment of affected body part  Description: INTERVENTIONS:  - Support, maintain and protect limb and body alignment  - Provide patient/ family with appropriate education  Outcome: Progressing     Problem: Prexisting or High Potential for Compromised Skin Integrity  Goal: Skin integrity is maintained or improved  Description: INTERVENTIONS:  - Identify patients at risk for skin breakdown  - Assess and monitor skin integrity  - Assess and  monitor nutrition and hydration status  - Monitor labs   - Assess for incontinence   - Turn and reposition patient  - Assist with mobility/ambulation  - Relieve pressure over bony prominences  - Avoid friction and shearing  - Provide appropriate hygiene as needed including keeping skin clean and dry  - Evaluate need for skin moisturizer/barrier cream  - Collaborate with interdisciplinary team   - Patient/family teaching  - Consider wound care consult   Outcome: Progressing

## 2025-01-10 NOTE — PLAN OF CARE
Problem: Potential for Falls  Goal: Patient will remain free of falls  Description: INTERVENTIONS:  - Educate patient/family on patient safety including physical limitations  - Instruct patient to call for assistance with activity   - Consult OT/PT to assist with strengthening/mobility   - Keep Call bell within reach  - Keep bed low and locked with side rails adjusted as appropriate  - Keep care items and personal belongings within reach  - Initiate and maintain comfort rounds  - Make Fall Risk Sign visible to staff  - Offer Toileting every 2 Hours, in advance of need  - Initiate/Maintain bed alarm  - Obtain necessary fall risk management equipment  - Apply yellow socks and bracelet for high fall risk patients  - Consider moving patient to room near nurses station  Outcome: Progressing     Problem: PAIN - ADULT  Goal: Verbalizes/displays adequate comfort level or baseline comfort level  Description: Interventions:  - Encourage patient to monitor pain and request assistance  - Assess pain using appropriate pain scale  - Administer analgesics based on type and severity of pain and evaluate response  - Implement non-pharmacological measures as appropriate and evaluate response  - Consider cultural and social influences on pain and pain management  - Notify physician/advanced practitioner if interventions unsuccessful or patient reports new pain  Outcome: Progressing     Problem: DISCHARGE PLANNING  Goal: Discharge to home or other facility with appropriate resources  Description: INTERVENTIONS:  - Identify barriers to discharge w/patient and caregiver  - Arrange for needed discharge resources and transportation as appropriate  - Identify discharge learning needs (meds, wound care, etc.)  - Arrange for interpretive services to assist at discharge as needed  - Refer to Case Management Department for coordinating discharge planning if the patient needs post-hospital services based on physician/advanced practitioner  order or complex needs related to functional status, cognitive ability, or social support system  Outcome: Progressing     Problem: Knowledge Deficit  Goal: Patient/family/caregiver demonstrates understanding of disease process, treatment plan, medications, and discharge instructions  Description: Complete learning assessment and assess knowledge base.  Interventions:  - Provide teaching at level of understanding  - Provide teaching via preferred learning methods  Outcome: Progressing     Problem: RESPIRATORY - ADULT  Goal: Achieves optimal ventilation and oxygenation  Description: INTERVENTIONS:  - Assess for changes in respiratory status  - Assess for changes in mentation and behavior  - Position to facilitate oxygenation and minimize respiratory effort  - Oxygen administered by appropriate delivery if ordered  - Initiate smoking cessation education as indicated  - Encourage broncho-pulmonary hygiene including cough, deep breathe, Incentive Spirometry  - Assess the need for suctioning and aspirate as needed  - Assess and instruct to report SOB or any respiratory difficulty  - Respiratory Therapy support as indicated  Outcome: Progressing     Problem: SKIN/TISSUE INTEGRITY - ADULT  Goal: Skin Integrity remains intact(Skin Breakdown Prevention)  Description: Assess:  -Perform Freddy assessment every shift  -Clean and moisturize skin every 2 hours and PRN  -Inspect skin when repositioning, toileting, and assisting with ADLS  -Assess under medical devices  -Assess extremities for adequate circulation and sensation     Bed Management:  -Have minimal linens on bed & keep smooth, unwrinkled  -Change linens as needed when moist or perspiring  -Avoid sitting or lying in one position for more than 2 hours while in bed  -Keep HOB at 30 degrees     Toileting:  -Offer bedside commode  -Assess for incontinence every 2 hours and PRN  -Use incontinent care products after each incontinent episode    Activity:  -Mobilize patient 3  times a day  -Encourage activity and walks on unit  -Encourage or provide ROM exercises   -Turn and reposition patient every 2 Hours  -Use appropriate equipment to lift or move patient in bed  -Instruct/ Assist with weight shifting every 2 hours when out of bed in chair  -Consider limitation of chair time 2 hour intervals    Skin Care:  -Avoid use of baby powder, tape, friction and shearing, hot water or constrictive clothing  -Relieve pressure over bony prominences   -Do not massage red bony areas    Next Steps:  -Teach patient strategies to minimize risks    -Consider consults to  interdisciplinary teams  Outcome: Progressing     Problem: MUSCULOSKELETAL - ADULT  Goal: Maintain or return mobility to safest level of function  Description: INTERVENTIONS:  - Assess patient's ability to carry out ADLs; assess patient's baseline for ADL function and identify physical deficits which impact ability to perform ADLs (bathing, care of mouth/teeth, toileting, grooming, dressing, etc.)  - Assess/evaluate cause of self-care deficits   - Assess range of motion  - Assess patient's mobility  - Assess patient's need for assistive devices and provide as appropriate  - Encourage maximum independence but intervene and supervise when necessary  - Involve family in performance of ADLs  - Assess for home care needs following discharge   - Consider OT consult to assist with ADL evaluation and planning for discharge  - Provide patient education as appropriate  Outcome: Progressing  Goal: Maintain proper alignment of affected body part  Description: INTERVENTIONS:  - Support, maintain and protect limb and body alignment  - Provide patient/ family with appropriate education  Outcome: Progressing     Problem: Prexisting or High Potential for Compromised Skin Integrity  Goal: Skin integrity is maintained or improved  Description: INTERVENTIONS:  - Identify patients at risk for skin breakdown  - Assess and monitor skin integrity  - Assess and  monitor nutrition and hydration status  - Monitor labs   - Assess for incontinence   - Turn and reposition patient  - Assist with mobility/ambulation  - Relieve pressure over bony prominences  - Avoid friction and shearing  - Provide appropriate hygiene as needed including keeping skin clean and dry  - Evaluate need for skin moisturizer/barrier cream  - Collaborate with interdisciplinary team   - Patient/family teaching  - Consider wound care consult   Outcome: Progressing     Problem: Nutrition/Hydration-ADULT  Goal: Nutrient/Hydration intake appropriate for improving, restoring or maintaining nutritional needs  Description: Monitor and assess patient's nutrition/hydration status for malnutrition. Collaborate with interdisciplinary team and initiate plan and interventions as ordered.  Monitor patient's weight and dietary intake as ordered or per policy. Utilize nutrition screening tool and intervene as necessary. Determine patient's food preferences and provide high-protein, high-caloric foods as appropriate.     INTERVENTIONS:  - Monitor oral intake, urinary output, labs, and treatment plans  - Assess nutrition and hydration status and recommend course of action  - Evaluate amount of meals eaten  - Assist patient with eating if necessary   - Allow adequate time for meals  - Recommend/ encourage appropriate diets, oral nutritional supplements, and vitamin/mineral supplements  - Order, calculate, and assess calorie counts as needed  - Recommend, monitor, and adjust tube feedings and TPN/PPN based on assessed needs  - Assess need for intravenous fluids  - Provide specific nutrition/hydration education as appropriate  - Include patient/family/caregiver in decisions related to nutrition  Outcome: Progressing

## 2025-01-10 NOTE — ASSESSMENT & PLAN NOTE
Due to Influenza A  Begin Tamiflu  Wean off as tolerated  Ceftriaxone was started, continue for now await cultures. However procalcitonin negative x2.Anticipate possible deescalation within the next 24 hours if procalcitonin remains negative.  Due to wheezing, will Switch nebulizer treatments to around the clock  Confirmed with daughter, she is full code. Respiratory protocol. Informed critical care about her deterioration this afternoon and requested assessment.

## 2025-01-10 NOTE — ASSESSMENT & PLAN NOTE
Uncontrolled on admission  Improved with med management. Amlodipine held by cardiology. Continue metoprolol, and lasix

## 2025-01-10 NOTE — PROGRESS NOTES
Progress Note - Critical Care/ICU   Name: Nilsa Morales 89 y.o. female I MRN: [id number]  Unit/Bed#: E5 -01 I Date of Admission: 1/9/2025   Date of Service: 1/10/2025 I Hospital Day: 1      Assessment & Plan  Acute respiratory failure with hypoxia (HCC)  Patient became hypoxic post vomiting likely secondary to aspiration  Significant tachypnea  Hypoxia  Patient is a level 1 full code confirmed with patient's daughter    Plan  Transfer to the intensive care unit  BiPAP  Tamiflu  IV antibiotic, Unasyn  Chest x-ray in a.m.  Acute on chronic diastolic (congestive) heart failure (HCC)  Wt Readings from Last 3 Encounters:   01/10/25 58.5 kg (129 lb)   01/08/25 56.7 kg (125 lb)   10/23/24 56.3 kg (124 lb 3.2 oz)   Monitor I's and O's  Continuous cardiopulmonary monitoring  BiPAP/oxygen as needed  To new diuresis as needed          History of paroxysmal atrial tachycardia  Continuous cardiopulmonary monitoring  Continue beta-blockade  Consider p.o. Lopressor once OG tube was placed  Anemia  Monitor hemoglobin  Transfuse as needed  Essential hypertension  Continuous cardiopulmonary monitoring  Maintain MAP of 65 mmHg  Will use IV labetalol for now to help control blood pressure, consider Cardene drip if needed  Lumbar stenosis with neurogenic claudication  Monitor pain  Treat as needed  Pericardial effusion  Cardiology following  Echocardiogram as needed  Idiopathic thrombocythemia (HCC)  Monitor platelet count  Consider heme-onc consult  History of stroke, 2020  Patient is currently obtunded  Continue neurochecks  Consider head CT if no improvement  Stage 3 chronic kidney disease, unspecified whether stage 3a or 3b CKD (HCC)  Lab Results   Component Value Date    EGFR 30 01/10/2025    EGFR 35 01/09/2025    EGFR 42 01/09/2025    CREATININE 1.48 (H) 01/10/2025    CREATININE 1.31 (H) 01/09/2025    CREATININE 1.13 01/09/2025   Monitor kidney indices  Avoid nephrotoxic medications  Avoid hypotension  Consider nephrology  consult  Disposition: Stepdown Level 1    ICU Core Measures     A: Assess, Prevent, and Manage Pain Has pain been assessed? Yes  Need for changes to pain regimen? No   B: Both SAT/SAT  N/A   C: Choice of Sedation RASS Goal: -1 Drowsy or 0 Alert and Calm  Need for changes to sedation or analgesia regimen? No   D: Delirium CAM-ICU: Negative   E: Early Mobility  Plan for early mobility? Yes   F: Family Engagement Plan for family engagement today? Yes       Antibiotic Review: Plan to change antibiotics to Unasyn, however patient has a allergy to ampicillin.  Will hold off and continue ceftriaxone      Prophylaxis:  VTE VTE covered by:  heparin (porcine), Subcutaneous       Stress Ulcer  not ordered         24 Hour Events : Called to see patient on the floor.  Upon my exam the patient is obtunded and tachypneic.  Requiring oxygen via nasal cannula and nonrebreather mask.  Patient transferred to the intensive care unit with the expectation that she would require intubation as she is a full code per her daughter who was in the room with her.    Patient was transferred to the intensive care unit unit.  Upon arrival she was less tachypneic and began to answer questions.  She is awake and alert.  She still has increased work of breathing.  We will hold on intubation and place her on BiPAP for now.  Continue antibiotics.    At this time the patient denies complaints of shortness of breath or chest pain.  She offers no complaints..  Subjective   Review of Systems: See HPI for Review of Systems    Objective :                   Vitals I/O      Most Recent Min/Max in 24hrs   Temp 97.6 °F (36.4 °C) Temp  Min: 97.6 °F (36.4 °C)  Max: 100.2 °F (37.9 °C)   Pulse 83 Pulse  Min: 55  Max: 100   Resp 18 Resp  Min: 18  Max: 24   BP (!) 170/119 BP  Min: 127/50  Max: 177/85   O2 Sat (!) 77 % SpO2  Min: 77 %  Max: 100 %      Intake/Output Summary (Last 24 hours) at 1/10/2025 0793  Last data filed at 1/10/2025 1300  Gross per 24 hour   Intake  180 ml   Output 550 ml   Net -370 ml       Diet NPO    Invasive Monitoring           Physical Exam   Physical Exam  Eyes:      Conjunctiva/sclera: Conjunctivae normal.      Pupils: Pupils are equal, round, and reactive to light.   Skin:     General: Skin is warm and dry.   HENT:      Head: Normocephalic and atraumatic.      Right Ear: Hearing normal.      Left Ear: Hearing normal.      Nose:      Comments: Normal     Mouth/Throat:      Mouth: Mucous membranes are dry.   Neck:      Comments: Supple no JVD no lymphadenopathy trachea midline  Cardiovascular:      Rate and Rhythm: Regular rhythm. Tachycardia present.      Pulses: Normal pulses.      Comments: Difficult auscultation secondary to respiratory adventitious sounds  Musculoskeletal:         General: Normal range of motion.      Right lower le+ Edema present.      Left lower le+ Edema present.   Abdominal: General: Bowel sounds are normal.      Palpations: Abdomen is soft.   Constitutional:       General: She is in acute distress.      Appearance: She is well-developed and well-nourished. She is ill-appearing.   Pulmonary:      Effort: Tachypnea, accessory muscle usage and accessory muscle usage present.      Breath sounds: Wheezing and rhonchi present.   Neurological:      Mental Status: She is somnolent.      Motor: Strength full and intact in all extremities.        Corneal reflex present, cough reflex and gag reflex intact.      Comments: Patient was initially obtunded, now responds to questions and follows commands.          Diagnostic Studies        Lab Results: I have reviewed the following results:     Medications:  Scheduled PRN   aspirin, 81 mg, Daily  atorvastatin, 40 mg, QPM  cefTRIAXone, 2,000 mg, Q24H  chlorhexidine, 15 mL, Q12H JOSE  [Held by provider] furosemide, 40 mg, BID (diuretic)  heparin (porcine), 5,000 Units, Q8H JOSE  melatonin, 6 mg, HS  metoprolol succinate, 25 mg, Daily  oseltamivir, 30 mg, Q24H      acetaminophen, 650 mg, Q6H  PRN  hydrOXYzine HCL, 10 mg, HS PRN  ipratropium-albuterol, 3 mL, Q6H PRN  ondansetron, 4 mg, Q6H PRN       Continuous          Labs:   CBC    Recent Labs     01/09/25  2221 01/10/25  0504   WBC 14.22* 11.65*   HGB 8.8* 8.2*   HCT 27.1* 25.7*   * 673*     BMP    Recent Labs     01/09/25  2221 01/10/25  0504   SODIUM 138 139   K 3.7 4.2    104   CO2 27 27   AGAP 9 8   BUN 26* 27*   CREATININE 1.31* 1.48*   CALCIUM 8.6 8.5       Coags    No recent results     Additional Electrolytes  Recent Labs     01/09/25  2221 01/10/25  0504   MG 2.2 2.2   PHOS  --  4.7*          Blood Gas    No recent results  No recent results LFTs  Recent Labs     01/09/25  0751 01/10/25  0504   ALT 17 14   AST 24 23   ALKPHOS 70 61   ALB 4.1 3.7   TBILI 0.46 0.33       Infectious  Recent Labs     01/09/25  2221   PROCALCITONI 0.19     Glucose  Recent Labs     01/09/25  0751 01/09/25  2221 01/10/25  0504   GLUC 129 177* 118

## 2025-01-10 NOTE — ASSESSMENT & PLAN NOTE
Wt Readings from Last 3 Encounters:   01/10/25 58.5 kg (129 lb)   01/08/25 56.7 kg (125 lb)   10/23/24 56.3 kg (124 lb 3.2 oz)     89 year old female presented to our institution due to shortness of breath. Clinical presentation initially suggestive of acute on chronic diastolic heart failure, presently with respiratory distress likely secondary to Influenza.  Diuretics on hold, appreciate cardiology recommendations  Daily weights, I/OS  Cardiology evaluation  Echocardiogram showing 72% EF, normal wall motion, Grade II DD  Telemetry

## 2025-01-10 NOTE — ASSESSMENT & PLAN NOTE
Patient became hypoxic post vomiting likely secondary to aspiration  Significant tachypnea  Hypoxia  Patient is a level 1 full code confirmed with patient's daughter    Plan  Transfer to the intensive care unit  BiPAP  Tamiflu  IV antibiotic, Unasyn  Chest x-ray in a.m.

## 2025-01-10 NOTE — UTILIZATION REVIEW
Initial Clinical Review    Admission: Date/Time/Statement:   Admission Orders (From admission, onward)       Ordered        01/09/25 0917  INPATIENT ADMISSION  Once                          Orders Placed This Encounter   Procedures    INPATIENT ADMISSION     Standing Status:   Standing     Number of Occurrences:   1     Level of Care:   Med Surg [16]     Estimated length of stay:   More than 2 Midnights     Certification:   I certify that inpatient services are medically necessary for this patient for a duration of greater than two midnights. See H&P and MD Progress Notes for additional information about the patient's course of treatment.     ED Arrival Information       Expected   -    Arrival   1/9/2025 07:26    Acuity   Emergent              Means of arrival   Ambulance    Escorted by   [facility]    Service   Hospitalist    Admission type   Emergency              Arrival complaint   sob             Chief Complaint   Patient presents with    Shortness of Breath     Sob and cough starting around 0300. Feels mildly anxious.        Initial Presentation: 89 y.o. female presents to ED via  EMS  from home with shortness of breath and  lower extremity  edema  over the past week.  Has  EVANS.  Woke the morning of admission with SOB and family  called  EMS.  Appeared volume overloaded with  edema, elevated  BP and imaging  findings.  Some improvement after  lasix.  PMH  is  PAT,  CKD, stroke, pericardial effusion and HTN.  Admit  Ip with Acute/chronic  diastolic CHF  and plan is  tele, daily weight, cardiology consult,  IV lasix, 2 DE and continue home meds.      Cardiology consult  Acute/Chronic HFpEF.  10  pounds  up  from dry weight.  Need strict  I & O and daily weight.  Continue IV  lasix  BID.  Wait  2  DE.      1/9   9: 30 PM  Appeared tachypneic   and in mild respiratory distress, anxious.    Tachycardic.  Urine output good.   Had an episode of nausea and vomiting  on exam.  CXR shows  concern for  possible  right sided  infiltrate.   Still with pulmonary edema but improved.   Meets  SIRS  criteria with tachycardia, tachypnea and  leukocytosis.  Suspect respiratory failure D/T pneumonia  and  start  SAJAN.  Creatinine  worsening and holding  further  IV  lasix  for now.    Anticipated Length of Stay/Certification Statement: Patient will be admitted on an inpatient basis with an anticipated length of stay of greater than 2 midnights secondary to iv diuretics, echo, cards reeval.     Date:   1/10      Day 2:   Continue to hold  further IV lasix for now.  Remains on  SAJAN.  Needs strict  I & O/daily weight.   SOB improved with nebs.  No JVD,  no B/L LE  edema.    ED Treatment-Medication Administration from 01/09/2025 0726 to 01/09/2025 1228         Date/Time Order Dose Route Action     01/09/2025 0752 metoprolol succinate (TOPROL-XL) 24 hr tablet 25 mg 25 mg Oral Given     01/09/2025 0752 amLODIPine (NORVASC) tablet 2.5 mg 2.5 mg Oral Given     01/09/2025 0946 furosemide (LASIX) injection 20 mg 20 mg Intravenous Given     01/09/2025 0944 hydrALAZINE (APRESOLINE) injection 5 mg 5 mg Intravenous Given     01/09/2025 1103 acetaminophen (TYLENOL) tablet 975 mg 975 mg Oral Given            Scheduled Medications:  aspirin, 81 mg, Oral, Daily  atorvastatin, 40 mg, Oral, QPM  cefTRIAXone, 2,000 mg, Intravenous, Q24H  [Held by provider] furosemide, 40 mg, Intravenous, BID (diuretic)  melatonin, 6 mg, Oral, HS  metoprolol succinate, 25 mg, Oral, Daily      Continuous IV Infusions:     PRN Meds:  acetaminophen, 650 mg, Oral, Q6H PRN  hydrOXYzine HCL, 10 mg, Oral, HS PRN  ipratropium-albuterol, 3 mL, Nebulization, Q6H PRN  ondansetron, 4 mg, Intravenous, Q6H PRN      ED Triage Vitals   Temperature Pulse Respirations Blood Pressure SpO2 Pain Score   01/09/25 0733 01/09/25 0733 01/09/25 0733 01/09/25 0733 01/09/25 0733 01/09/25 1317   98.3 °F (36.8 °C) 94 20 (!) 226/117 95 % No Pain     Weight (last 2 days)       Date/Time Weight     01/10/25 0539 58.5 (129)    01/09/25 1413 61.2 (135)    01/09/25 1316 61.5 (135.58)    01/09/25 0900 57 (125.66)            Vital Signs (last 3 days)       Date/Time Temp Pulse Resp BP MAP (mmHg) SpO2 O2 Flow Rate (L/min) O2 Device Patient Position - Orthostatic VS Pain    01/10/25 11:08:39 97.6 °F (36.4 °C) 83 -- 127/50 76 96 % -- -- -- --    01/10/25 11:07:21 97.6 °F (36.4 °C) 55 22 127/50 76 97 % -- -- Lying --    01/10/25 0902 -- -- -- -- -- -- -- -- -- Med Not Given for Pain - for MAR use only    01/10/25 0850 -- -- -- -- -- -- -- -- -- No Pain    01/10/25 08:48:07 100.2 °F (37.9 °C) 92 22 151/60 90 95 % -- -- Lying --    01/10/25 04:59:20 98.1 °F (36.7 °C) 88 -- -- -- 98 % -- -- -- --    01/10/25 0355 -- -- -- -- -- 97 % -- -- -- --    01/10/25 03:24:39 99.6 °F (37.6 °C) 96 24 133/63 86 98 % 4 L/min Simple mask -- --    01/09/25 23:51:36 99.5 °F (37.5 °C) 62 22 143/54 84 95 % 4 L/min Simple mask Lying --    01/09/25 2130 -- -- -- -- -- 91 % -- -- -- --    01/09/25 2119 -- -- -- -- -- 100 % -- -- -- --    01/09/25 2101 -- -- -- -- -- -- -- -- -- No Pain    01/09/25 2052 -- -- -- -- -- 99 % -- -- -- --    01/09/25 2016 -- 100 -- 177/85 116 94 % -- -- -- --    01/09/25 19:09:13 98.9 °F (37.2 °C) 93 24 171/76 108 97 % -- -- -- --    01/09/25 15:24:20 98.7 °F (37.1 °C) 84 18 137/66 90 95 % -- -- -- --    01/09/25 1413 -- 94 -- 132/69 -- -- -- -- -- --    01/09/25 1317 -- -- -- -- -- -- -- -- -- No Pain    01/09/25 1314 -- -- 20 -- -- -- -- -- -- --    01/09/25 12:39:31 -- 62 -- 132/69 90 93 % -- -- -- --    01/09/25 1130 -- 94 22 148/67 97 97 % -- Nasal cannula Lying --    01/09/25 1030 -- 102 24 151/66 95 95 % -- -- -- --    01/09/25 0930 -- 86 20 189/84 121 98 % -- -- -- --    01/09/25 0915 -- 84 20 216/93 133 98 % -- -- -- --    01/09/25 0830 -- 82 22 207/82 118 98 % -- Nasal cannula -- --    01/09/25 0733 98.3 °F (36.8 °C) 94 20 226/117 -- 95 % -- None (Room air) -- --              Pertinent Labs/Diagnostic  Test Results:   Radiology:  XR chest portable   Final Interpretation by Michael Arredondo MD (01/10 0816)   CHF with bibasilar pleural effusions noted and probable compressive atelectasis.      Workstation performed: [id number]         XR chest 2 views   ED Interpretation by Alejandra Reynoso DO (01/09 0852)   FINDINGS:     Interstitial edema. Bilateral lower lobe compressive atelectasis.  No pneumothorax. Small bilateral pleural effusions.     Normal cardiomediastinal silhouette.     Degenerative disease of the glenohumeral and acromioclavicular joints.     Normal upper abdomen.     IMPRESSION:     Interstitial edema and small effusions.           Final Interpretation by Debbi Forde MD (01/09 0849)      Interstitial edema and small effusions.            Workstation performed: [id number]           Cardiology:          Results from last 7 days   Lab Units 01/10/25  0504 01/09/25  2221 01/09/25  0749   WBC Thousand/uL 11.65* 14.22* 13.49*   HEMOGLOBIN g/dL 8.2* 8.8* 9.0*   HEMATOCRIT % 25.7* 27.1* 28.3*   PLATELETS Thousands/uL 673* 748* 839*   TOTAL NEUT ABS Thousands/µL 9.80* 12.21* 10.28*     Results from last 7 days   Lab Units 01/10/25  0504   RETIC CT ABS  53,200   RETIC CT PCT % 1.90*     Results from last 7 days   Lab Units 01/10/25  0504 01/09/25  2221 01/09/25  0751   SODIUM mmol/L 139 138 139   POTASSIUM mmol/L 4.2 3.7 4.0   CHLORIDE mmol/L 104 102 105   CO2 mmol/L 27 27 26   ANION GAP mmol/L 8 9 8   BUN mg/dL 27* 26* 27*   CREATININE mg/dL 1.48* 1.31* 1.13   EGFR ml/min/1.73sq m 30 35 42   CALCIUM mg/dL 8.5 8.6 8.7   MAGNESIUM mg/dL 2.2 2.2  --    PHOSPHORUS mg/dL 4.7*  --   --      Results from last 7 days   Lab Units 01/10/25  0504 01/09/25  0751   AST U/L 23 24   ALT U/L 14 17   ALK PHOS U/L 61 70   TOTAL PROTEIN g/dL 6.1* 6.7   ALBUMIN g/dL 3.7 4.1   TOTAL BILIRUBIN mg/dL 0.33 0.46         Results from last 7 days   Lab Units 01/10/25  0504 01/09/25  2221 01/09/25  0751   GLUCOSE RANDOM mg/dL  118 177* 129               Results from last 7 days   Lab Units 01/09/25  1713 01/09/25  0944 01/09/25  0751   HS TNI 0HR ng/L  --   --  4   HS TNI 2HR ng/L  --  8  --    HSTNI D2 ng/L  --  4  --    HS TNI 4HR ng/L 20  --   --    HSTNI D4 ng/L 16  --   --      Results from last 7 days   Lab Units 01/09/25  0827   D-DIMER QUANTITATIVE ug/ml FEU 0.90*             Results from last 7 days   Lab Units 01/09/25  2221   PROCALCITONIN ng/ml 0.19     Results from last 7 days   Lab Units 01/09/25  2221   LACTIC ACID mmol/L 1.8             Results from last 7 days   Lab Units 01/09/25  0749   BNP pg/mL 545*               Results from last 7 days   Lab Units 01/09/25  2219   BLOOD CULTURE  Received in Microbiology Lab. Culture in Progress.  Received in Microbiology Lab. Culture in Progress.               Present on Admission:   Anemia   Essential hypertension   Lumbar stenosis with neurogenic claudication   Stage 3 chronic kidney disease, unspecified whether stage 3a or 3b CKD (HCC)   Pericardial effusion   Idiopathic thrombocythemia (HCC)      Admitting Diagnosis: Pulmonary edema [J81.1]  Dyspnea [R06.00]  SOB (shortness of breath) [R06.02]  Bilateral leg edema [R60.0]  Elevated blood pressure reading with diagnosis of hypertension [I10]  Age/Sex: 89 y.o. female    Network Utilization Review Department  ATTENTION: Please call with any questions or concerns to [phone] and carefully listen to the prompts so that you are directed to the right person. All voicemails are confidential.   For Discharge needs, contact Care Management DC Support Team at [phone] opt. 2  Send all requests for admission clinical reviews, approved or denied determinations and any other requests to dedicated fax number below belonging to the campus where the patient is receiving treatment. List of dedicated fax numbers for the Facilities:  FACILITY NAME UR FAX NUMBER   ADMISSION DENIALS (Administrative/Medical Necessity) [phone]   DISCHARGE  SUPPORT TEAM (NETWORK) [phone]   PARENT CHILD HEALTH (Maternity/NICU/Pediatrics) [phone]   [facility] [phone]   [facility] [phone]   [facility] [phone]   [facility] [phone]   [facility] [phone]   [facility] [phone]   General acute hospital [phone]   [facility] [phone]   [facility] [phone]   [facility] [phone]   [facility] [phone]   [facility] [phone]

## 2025-01-10 NOTE — ASSESSMENT & PLAN NOTE
Recent Labs     01/09/25  0751   BUN 27*   CREATININE 1.13   EGFR 42     Stable, does not meet NOLBERTO criteria

## 2025-01-10 NOTE — ASSESSMENT & PLAN NOTE
Lab Results   Component Value Date    EGFR 30 01/10/2025    EGFR 35 01/09/2025    EGFR 42 01/09/2025    CREATININE 1.48 (H) 01/10/2025    CREATININE 1.31 (H) 01/09/2025    CREATININE 1.13 01/09/2025   Monitor kidney indices  Avoid nephrotoxic medications  Avoid hypotension  Consider nephrology consult

## 2025-01-10 NOTE — ASSESSMENT & PLAN NOTE
Workup in progress outpatient    Results from last 7 days   Lab Units 01/09/25  0749   HEMOGLOBIN g/dL 9.0*   MCV fL 94   RDW % 16.9*

## 2025-01-10 NOTE — CASE MANAGEMENT
Case Management Assessment & Discharge Planning Note    Patient name Nilsa Morales  Location [facility] /E5 -* MRN [id number]  : 2/3/1934 Date 1/10/2025       Current Admission Date: 2025  Current Admission Diagnosis:Acute on chronic diastolic (congestive) heart failure (HCC)   Patient Active Problem List    Diagnosis Date Noted Date Diagnosed    Acute on chronic diastolic (congestive) heart failure (HCC) 2025     History of paroxysmal atrial tachycardia 2025     Stage 3 chronic kidney disease, unspecified whether stage 3a or 3b CKD (HCC) 2025     History of CVA in adulthood 10/23/2024     Lumbar radiculopathy      Anemia 2022     Pericardial effusion 2020     History of stroke, 2020     Idiopathic thrombocythemia (HCC) 2020     Disability of walking 2017     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     Lumbar stenosis with neurogenic claudication 05/15/2017     Vitamin B 12 deficiency 2016     Elevated fasting glucose 2016     Essential hypertension 2016     Osteoporosis 2011       LOS (days): 1  Geometric Mean LOS (GMLOS) (days): 4  Days to GMLOS:2.7     OBJECTIVE:    Risk of Unplanned Readmission Score: 14.17         Current admission status: Inpatient       Preferred Pharmacy:   [id number] - [place], [place] - [address] [place] [id number]  Phone: [phone] Fax: [phone]    Primary Care Provider: Danii Aceves DO    Primary Insurance: [employer]  Secondary Insurance:     ASSESSMENT:  Active Health Care Proxies    There are no active Health Care Proxies on file.       Advance Directives  Does patient have a Health Care POA?: Yes  Does patient have Advance Directives?: Yes  Advance Directives: Living will, Power of  for health care  Primary Contact: Christiano Morales, son         Readmission Root  Cause  30 Day Readmission: No    Patient Information  Admitted from:: Home  Mental Status: Alert  During Assessment patient was accompanied by: Daughter  Assessment information provided by:: Daughter  Primary Caregiver: Child  Caregiver's Name:: robyn Calhoun  Caregiver's Relationship to Patient:: Family Member  Caregiver's Telephone Number:: [phone]  Support Systems: Self, Spouse/significant other, Children, Family members  County of Residence: [place]  What city do you live in?: [place]  Home entry access options. Select all that apply.: Stairs  Number of steps to enter home.: 1  Type of Current Residence: Bi-level  Upon entering residence, is there a bedroom on the main floor (no further steps)?: Yes (Stair glide to main floor)  Upon entering residence, is there a bathroom on the main floor (no further steps)?: Yes (Stair glide to main floor)  Living Arrangements: Lives w/ Spouse/significant other (Also in poor health)  Is patient a ?: No    Activities of Daily Living Prior to Admission  Functional Status: Independent  Completes ADLs independently?: Yes  Ambulates independently?: Yes  Does patient use assisted devices?: No  Does patient currently own DME?: No  Does patient have a history of Outpatient Therapy (PT/OT)?: No  Does the patient have a history of Short-Term Rehab?: No  Does patient have a history of HHC?: No  Does patient currently have HHC?: No         Patient Information Continued  Income Source: Pension/FCI  Does patient have prescription coverage?: Yes  Does patient receive dialysis treatments?: No  Does patient have a history of substance abuse?: No  Does patient have a history of Mental Health Diagnosis?: No         Means of Transportation  Means of Transport to Appts:: Family transport          DISCHARGE DETAILS:    Discharge planning discussed with:: Daughter and Niece  Freedom of Choice: Yes     CM contacted family/caregiver?: Yes  Were Treatment Team discharge  recommendations reviewed with patient/caregiver?: Yes  Did patient/caregiver verbalize understanding of patient care needs?: Yes  Were patient/caregiver advised of the risks associated with not following Treatment Team discharge recommendations?: Yes    Contacts  Patient Contacts: robyn Calhoun  Relationship to Patient:: Family  Contact Method: Phone  Phone Number: [phone] or email [email or web address]  Reason/Outcome: Continuity of Care, Emergency Contact, Discharge Planning    Additional Comments: TOBY m/w pt's daughter Yasmine to complete an assessment and discuss discharge planning.  Pt lives with her  who is also in poor health.  Pt and her  have HHAs in the morning and at night 7 days a week.  Covered by 's [employer] insurance.  Yasmine lives 5 minutes from her parents and her cousin visits 3 days/week to assist with medication management.  Pt's son/POA lives in [place] and is currently in [facility].  Per Yasmine, her cousin, Nupur recently paid a deposit to [facility] for the pt and her .  Yasmine is hoping pt can discharge directly to [facility].  CM s/w Nupur who also confirmed this.  TOBY left a VM message for Fawn Shirley, /Sales with [facility].  Requested a call back.  Pt has an open case with [facility] for self-neglect. Apparently, pt has not been taking her medications.  TOBY called CW, Sharla Perdomo ([phone]), reviewed pt's case.  Informed Sharla that pt may be discharging to [facility] directly from the hospital.  Sharla was agreeable to this.  If pt does discharge back home, Sharla is agreeable provided pt has VNA set-up for medication management.  CM will update Sharla when pt is cleared for discharge and her discharge plan.

## 2025-01-10 NOTE — QUICK NOTE
Nursing reached out secondary to new hypoxia and worsening tachypnea  Pt assessed with daughter at the bedside. Pt does appear tachypneic and in mild resp distress. Pt appears anxious. Pt also noted to be tachycardic. Vitals otherwise stable. Lungs while R rales. Mild diffuse expiratory wheezing.   Nursing reported pt recently had albuterol treatment with no improvement and is having good urine output following IV lasix earlier today for acute CHF.   Pt did also have episode of N/V while assessing. This made pt more anxious, noted to be mouth breathing. Not taking deep breaths through nose.  Pt placed on nonrebreather at that time with improvement, and was able to be weened to 4L while wearing simple mask which patient reports she felt more comfortable wearing.   CXR ordered. Concern for possible right sided infiltrate per my read. Pulm edema still present, but improved compared with CXR obtained this AM. Official read pending.   Secondary to pt SIRS with tachycardia, tachypnea, and leukocytosis. Repeat labs ordered.   WBC 13.49->14.22  Procalc 0.19, continue to trend  Lactic 1.8  Creatinine 1.13->1.31  RP2 panel ordered by day team pending     A/P  Given above, suspect acute resp failure more likely secondary to suspected R pneumonia with mild component of CHF  Will initiate pt on ceftriaxone   Will hold off at this time on additional lasix given pt worsening creatinine, improving vascular congestion on CXR, and good urine output since last dose  Discussed with daughter at the bedside, agreeable to plan

## 2025-01-10 NOTE — ASSESSMENT & PLAN NOTE
Recent Labs     01/09/25  0751 01/09/25  2221 01/10/25  0504   BUN 27* 26* 27*   CREATININE 1.13 1.31* 1.48*   EGFR 42 35 30     Uptrending creatinine levels

## 2025-01-10 NOTE — ASSESSMENT & PLAN NOTE
Continuous cardiopulmonary monitoring  Continue beta-blockade  Consider p.o. Lopressor once OG tube was placed

## 2025-01-10 NOTE — NURSING NOTE
Patient placed on bedpan at patient request due to weakness to both LE, urine lab collected, patient cleaned and boosted in bed - sudden burning pain to bilateral thighs post bedpan use. No PRN medications at this time, ice packs applied to bilateral posterior thighs at patient request. Patient wheezy and pursed lip breathing without oxygen desaturation on NC. Attending, Dr. Forte, notified and at bedside - Albuterol julian ordered at this time. Thigh pain resolving, no PRN medicine administered, ice packs still in place. Call bell within reach, NC intact.

## 2025-01-10 NOTE — ASSESSMENT & PLAN NOTE
Currently being worked up outpatient by hematology oncology    Recent Labs     01/09/25  0749 01/09/25  2221 01/10/25  0504   * 748* 673*

## 2025-01-10 NOTE — ASSESSMENT & PLAN NOTE
Wt Readings from Last 3 Encounters:   01/10/25 58.5 kg (129 lb)   01/08/25 56.7 kg (125 lb)   10/23/24 56.3 kg (124 lb 3.2 oz)   Monitor I's and O's  Continuous cardiopulmonary monitoring  BiPAP/oxygen as needed  To new diuresis as needed

## 2025-01-10 NOTE — ASSESSMENT & PLAN NOTE
Workup in progress outpatient    Results from last 7 days   Lab Units 01/10/25  0504 01/09/25  2221 01/09/25  0749   HEMOGLOBIN g/dL 8.2* 8.8* 9.0*   MCV fL 92 92 94   RDW % 17.3* 17.2* 16.9*   FOLATE ng/mL >22.3  --   --

## 2025-01-10 NOTE — PROGRESS NOTES
Cardiology Progress Note   MD Corey Alba MD, [facility]  Don Keller DO, [facility]  MD Myrna Monson DO, [facility]  Angel Santos DO, [facility]  ----------------------------------------------------------------  [address] [address]    Nilsa Morales 89 y.o. female MRN: [id number]  Unit/Bed#: E5 -01 Encounter: [id number]      ASSESSMENT:   Acute on chronic HFpEF  LVEF 72%, moderate LVH, grade 2 diastolic dysfunction with elevated LAP, speckled appearance to myocardium, severe LAE and mild RA dilatation, mild AR, AV sclerosis, moderate MAC, mild to moderate MR, trace TR with PASP 46 mmHg, small pericardial effusion, moderate pleural effusion, January 2025  Possible pneumonia  Hypertension  Paroxysmal atrial tachycardia, June 2020  Small pericardial effusion  History of CVA  CKD stage III  Anemia    PLAN:  Patient had respiratory distress overnight with no hypoxia despite IV diuresis  Concern from [facility] for pneumonia and is now on antibiotics  Hold further IV diuretics for the moment pending morning labs and reevaluation  Strict I's/O's and daily weights  Keep potassium greater than 4 and magnesium greater than 2  2D echocardiogram showed significant thickening of the left ventricular walls for which cardiac amyloid cannot be excluded  SPEP, UPEP and immunofixation are currently pending  Hold amlodipine and continue Toprol-XL and statin  Awaiting morning labs    Signed: Don Keller DO, [facility], HARRY JOHNSON, [facility]      History of Present Illness:  Patient seen and examined.  Admits to some shortness of breath improved with nebulizers.  Denies chest pain, pressure, tightness or squeezing.  Denies lightheadedness, dizziness or palpitations.  Denies paroxysmal nocturnal dyspnea.      Review of Systems:  Review of Systems   Constitutional: Negative for decreased appetite, fever, weight gain and weight loss.   HENT:  Negative for congestion and sore  throat.    Eyes:  Negative for visual disturbance.   Cardiovascular:  Positive for dyspnea on exertion. Negative for chest pain, leg swelling, near-syncope and palpitations.   Respiratory:  Positive for cough and shortness of breath.    Hematologic/Lymphatic: Negative for bleeding problem.   Skin:  Negative for rash.   Musculoskeletal:  Negative for myalgias and neck pain.   Gastrointestinal:  Negative for abdominal pain and nausea.   Neurological:  Negative for light-headedness and weakness.   Psychiatric/Behavioral:  Negative for depression.        Allergies   Allergen Reactions    Codeine Nausea Only and GI Intolerance    Erythromycin Nausea Only and GI Intolerance    Amoxicillin Rash    Azithromycin Rash    Erythromycin Base Rash    Metoprolol Rash    Other Rash    Oxycodone Rash    Oxycodone-Acetaminophen Rash       No current facility-administered medications on file prior to encounter.     Current Outpatient Medications on File Prior to Encounter   Medication Sig    amLODIPine (NORVASC) 2.5 mg tablet Take 1 tablet (2.5 mg total) by mouth daily    ammonium lactate (LAC-HYDRIN) 12 % lotion Apply topically 2 (two) times a day as needed for dry skin    ascorbic acid (VITAMIN C) 1000 MG tablet     aspirin (Aspirin 81) 81 mg chewable tablet     atorvastatin (LIPITOR) 40 mg tablet Take 40 mg by mouth every evening    betamethasone dipropionate (DIPROSONE) 0.05 % cream Apply topically 2 (two) times a day To affected rash for up to 14 days; avoid face/genitals    Cholecalciferol (Vitamin D3) 1.25 MG (68838 UT) CAPS Take 1 tablet by mouth daily    desoximetasone (TOPICORT) 0.25 % cream Apply topically to skin as needed.    metoprolol succinate (TOPROL-XL) 25 mg 24 hr tablet Take 1 tablet (25 mg total) by mouth daily    Multiple Vitamins-Minerals (ICAPS AREDS 2 PO)         Current Facility-Administered Medications   Medication Dose Route Frequency Provider Last Rate    acetaminophen  650 mg Oral Q6H PRN Sanket Forte MD       aspirin  81 mg Oral Daily Sanket Forte MD      atorvastatin  40 mg Oral QPM Sanket Forte MD      cefTRIAXone  2,000 mg Intravenous Q24H Go Roberts PA-C 2,000 mg (01/09/25 2329)    furosemide  40 mg Intravenous BID (diuretic) Massiel Santana PA-C      hydrOXYzine HCL  10 mg Oral HS PRN Sanket Forte MD      ipratropium-albuterol  3 mL Nebulization Q6H PRN Go Roberts PA-C      melatonin  6 mg Oral HS Sanket Forte MD      metoprolol succinate  25 mg Oral Daily Sanket Forte MD      ondansetron  4 mg Intravenous Q6H PRN Go Roberts PA-C              Vitals:    01/09/25 2119 01/09/25 2130 01/09/25 2351 01/10/25 0324   BP:   143/54 133/63   BP Location:   Right arm    Pulse:   62 96   Resp:   22 (!) 24   Temp:   99.5 °F (37.5 °C) 99.6 °F (37.6 °C)   TempSrc:   Axillary Oral   SpO2: 100% 91% 95% 98%   Weight:       Height:         Body mass index is 24.69 kg/m².      Intake/Output Summary (Last 24 hours) at 1/10/2025 0354  Last data filed at 1/9/2025 2101  Gross per 24 hour   Intake --   Output 450 ml   Net -450 ml       Weight change:     PHYSICAL EXAMINATION:  Gen: Awake, Alert, NAD  Head/eyes: AT/NC, pupils equal and round, Anicteric  ENT: mmm  Neck: Supple, No elevated JVP, trachea midline  Resp: Decreased breath sounds bilaterally with minor crackles  CV: RRR +S1, S2, No m/r/g  Abd: Soft, NT/ND + BS  Ext: no LE edema bilaterally  Neuro: Follows commands, moves all extermities  Psych: Appropriate affect, normal mood, pleasant attitude, non-combative  Skin: warm; no rash, erythema or venous stasis changes on exposed skin    Lab Results:  Results from last 7 days   Lab Units 01/09/25  2221   WBC Thousand/uL 14.22*   HEMOGLOBIN g/dL 8.8*   HEMATOCRIT % 27.1*   PLATELETS Thousands/uL 748*     Results from last 7 days   Lab Units 01/09/25  2221 01/09/25  0751   POTASSIUM mmol/L 3.7 4.0   CHLORIDE mmol/L 102 105   CO2 mmol/L 27 26   BUN mg/dL 26* 27*   CREATININE mg/dL 1.31* 1.13   CALCIUM mg/dL 8.6  "8.7   ALK PHOS U/L  --  70   ALT U/L  --  17   AST U/L  --  24     No results found for: \"TROPONINT\"      Results from last 7 days   Lab Units 01/09/25  1713 01/09/25  0944 01/09/25  0751   HS TNI 0HR ng/L  --   --  4   HS TNI 2HR ng/L  --  8  --    HS TNI 4HR ng/L 20  --   --            Tele: SR    This note was completed in part utilizing M-Modal Fluency Direct Software.  Grammatical errors, random word insertions, spelling mistakes, and incomplete sentences may be an occasional consequence of this system secondary to software limitations, ambient noise, and hardware issues.  If you have any questions or concerns about the content, text, or information contained within the body of this dictation, please contact the provider for clarification.      "

## 2025-01-10 NOTE — PROGRESS NOTES
Progress Note - Hospitalist   Name: Nilsa Morales 89 y.o. female I MRN: [id number]  Unit/Bed#: E5 -01 I Date of Admission: 1/9/2025   Date of Service: 1/10/2025 I Hospital Day: 1    Assessment & Plan  Acute on chronic diastolic (congestive) heart failure (HCC)  Wt Readings from Last 3 Encounters:   01/10/25 58.5 kg (129 lb)   01/08/25 56.7 kg (125 lb)   10/23/24 56.3 kg (124 lb 3.2 oz)     89 year old female presented to our institution due to shortness of breath. Clinical presentation initially suggestive of acute on chronic diastolic heart failure, presently with respiratory distress likely secondary to Influenza.  Diuretics on hold, appreciate cardiology recommendations  Daily weights, I/OS  Cardiology evaluation  Echocardiogram showing 72% EF, normal wall motion, Grade II DD  Telemetry    Acute respiratory failure with hypoxia (HCC)  Due to Influenza A  Begin Tamiflu  Wean off as tolerated  Ceftriaxone was started, continue for now await cultures. However procalcitonin negative x2.Anticipate possible deescalation within the next 24 hours if procalcitonin remains negative.  Due to wheezing, will Switch nebulizer treatments to around the clock  Confirmed with daughter, she is full code. Respiratory protocol. Informed critical care about her deterioration this afternoon and requested assessment.  Anemia  Workup in progress outpatient    Results from last 7 days   Lab Units 01/10/25  0504 01/09/25  2221 01/09/25  0749   HEMOGLOBIN g/dL 8.2* 8.8* 9.0*   MCV fL 92 92 94   RDW % 17.3* 17.2* 16.9*   FOLATE ng/mL >22.3  --   --      Essential hypertension  Uncontrolled on admission  Improved with med management. Amlodipine held by cardiology. Continue metoprolol, and lasix  Lumbar stenosis with neurogenic claudication    Pericardial effusion  History of small pericardial effusion  History of stroke, 2020    Stage 3 chronic kidney disease, unspecified whether stage 3a or 3b CKD (HCC)    Recent Labs      01/09/25  0751 01/09/25  2221 01/10/25  0504   BUN 27* 26* 27*   CREATININE 1.13 1.31* 1.48*   EGFR 42 35 30     Uptrending creatinine levels      History of paroxysmal atrial tachycardia  Continue Metoprolol  Idiopathic thrombocythemia (HCC)  Currently being worked up outpatient by hematology oncology    Recent Labs     01/09/25  0749 01/09/25  2221 01/10/25  0504   * 748* 673*       VTE Pharmacologic Prophylaxis:   Moderate Risk (Score 3-4) - Pharmacological DVT Prophylaxis Ordered: heparin.    Mobility:   Basic Mobility Inpatient Raw Score: 16  -[facility] Goal: 5: Stand one or more mins  -[facility] Achieved: 4: Move to chair/commode    Patient Centered Rounds: I performed bedside rounds with nursing staff today.   Discussions with Specialists or Other Care Team Provider: cardiology    Education and Discussions with Family / Patient: Updated  (daughter) at bedside.    Current Length of Stay: 1 day(s)  Current Patient Status: Inpatient   Certification Statement: The patient will continue to require additional inpatient hospital stay due to iv antibiotics, cardiology, crtical care evaluationfurther imaging  Discharge Plan: Anticipate discharge in 24-48 hrs to home.    Code Status: Level 1 - Full Code    Subjective   Patient seen and examined at bedside. She is currently in respiratory distress. Confirmed with daughter that she is currently full code.    Objective :  Temp:  [97.6 °F (36.4 °C)-100.2 °F (37.9 °C)] 97.6 °F (36.4 °C)  HR:  [] 83  BP: (127-177)/(50-85) 127/50  Resp:  [22-24] 22  SpO2:  [91 %-100 %] 96 %  O2 Device: Simple mask    Body mass index is 23.59 kg/m².     Input and Output Summary (last 24 hours):     Intake/Output Summary (Last 24 hours) at 1/10/2025 1547  Last data filed at 1/10/2025 1300  Gross per 24 hour   Intake 180 ml   Output 550 ml   Net -370 ml       Physical Exam  Vitals reviewed.   Constitutional:       General: She is in acute distress.      Appearance: She is  ill-appearing.   HENT:      Head: Normocephalic.      Nose: Nose normal.      Mouth/Throat:      Mouth: Mucous membranes are moist.   Eyes:      General: No scleral icterus.  Cardiovascular:      Rate and Rhythm: Regular rhythm. Tachycardia present.   Pulmonary:      Effort: Respiratory distress present.      Breath sounds: Wheezing (Expiratory) and rhonchi present.   Abdominal:      General: There is no distension.      Palpations: Abdomen is soft.      Tenderness: There is no abdominal tenderness.   Skin:     General: Skin is warm.   Neurological:      Comments: somnolent       Lines/Drains:        Telemetry:  Telemetry Orders (From admission, onward)               24 Hour Telemetry Monitoring  Continuous x 24 Hours (Telem)        Expiring   Question:  Reason for 24 Hour Telemetry  Answer:  Decompensated CHF- and any one of the following: continuous diuretic infusion or total diuretic dose >200 mg daily, associated electrolyte derangement (I.e. K < 3.0), inotropic drip (continuous infusion), hx of ventricular arrhythmia, or new EF < 35%                     Indication for Continued Telemetry Use: Acute MI/Unstable Angina/Rule out ACS               Lab Results: I have reviewed the following results:   Results from last 7 days   Lab Units 01/10/25  0504   WBC Thousand/uL 11.65*   HEMOGLOBIN g/dL 8.2*   HEMATOCRIT % 25.7*   PLATELETS Thousands/uL 673*   SEGS PCT % 84*   LYMPHO PCT % 7*   MONO PCT % 8   EOS PCT % 0     Results from last 7 days   Lab Units 01/10/25  0504   SODIUM mmol/L 139   POTASSIUM mmol/L 4.2   CHLORIDE mmol/L 104   CO2 mmol/L 27   BUN mg/dL 27*   CREATININE mg/dL 1.48*   ANION GAP mmol/L 8   CALCIUM mg/dL 8.5   ALBUMIN g/dL 3.7   TOTAL BILIRUBIN mg/dL 0.33   ALK PHOS U/L 61   ALT U/L 14   AST U/L 23   GLUCOSE RANDOM mg/dL 118                 Results from last 7 days   Lab Units 01/09/25  2221   LACTIC ACID mmol/L 1.8   PROCALCITONIN ng/ml 0.19       Recent Cultures (last 7 days):   Results from  last 7 days   Lab Units 01/09/25  1716   BLOOD CULTURE  Received in Microbiology Lab. Culture in Progress.  Received in Microbiology Lab. Culture in Progress.     XR chest reviewed  Last 24 Hours Medication List:     Current Facility-Administered Medications:     acetaminophen (TYLENOL) tablet 650 mg, Q6H PRN    aspirin chewable tablet 81 mg, Daily    atorvastatin (LIPITOR) tablet 40 mg, QPM    cefTRIAXone (ROCEPHIN) 2,000 mg in dextrose 5 % 50 mL IVPB, Q24H, Last Rate: 2,000 mg (01/09/25 5019)    [Held by provider] furosemide (LASIX) injection 40 mg, BID (diuretic)    hydrOXYzine HCL (ATARAX) tablet 10 mg, HS PRN    ipratropium-albuterol (DUO-NEB) 0.5-2.5 mg/3 mL inhalation solution 3 mL, Q6H PRN    melatonin tablet 6 mg, HS    metoprolol succinate (TOPROL-XL) 24 hr tablet 25 mg, Daily    ondansetron (ZOFRAN) injection 4 mg, Q6H PRN      **Please Note: This note may have been constructed using a voice recognition system.**

## 2025-01-10 NOTE — ASSESSMENT & PLAN NOTE
Continuous cardiopulmonary monitoring  Maintain MAP of 65 mmHg  Will use IV labetalol for now to help control blood pressure, consider Cardene drip if needed

## 2025-01-11 NOTE — PROCEDURES
Intubation    Date/Time: 1/11/2025 6:05 PM    Performed by: BETZAIDA Singh  Authorized by: BETZAIDA Singh    Patient location:  Bedside  Consent:     Consent obtained:  Verbal    Consent given by:  Healthcare agent    Risks discussed:  Aspiration, brain injury, dental trauma, laryngeal injury, pneumothorax, hypoxia, death and bleeding    Alternatives discussed:  Delayed treatment, alternative treatment and no treatment  Universal protocol:     Procedure explained and questions answered to patient or proxy's satisfaction: yes      Relevant documents present and verified: yes      Test results available and properly labeled: yes      Radiology Images displayed and confirmed.  If images not available, report reviewed: yes      Required blood products, implants, devices, and special equipment available: yes      Site/side marked: yes      Immediately prior to procedure, a time out was called: yes      Patient identity confirmed:  Hospital-assigned identification number and arm band  Pre-procedure details:     Patient status:  Altered mental status    Mallampati score:  2    Pretreatment medications:  Etomidate    Paralytics:  None  Indications:     Indications for intubation: respiratory distress and respiratory failure    Procedure details:     Preoxygenation:  Bag valve mask    CPR in progress: no      Laryngoscope blade:  [place] 3    Tube size (mm):  7.0    Tube type:  Cuffed    Number of attempts:  2    Ventilation between attempts: yes      Cricoid pressure: yes      Tube visualized through cords: yes    Placement assessment:     ETT to lip:  22    Tube secured with:  ETT lizama    Breath sounds:  Equal    Placement verification: condensation and ETCO2 detector      CXR findings:  ETT in proper place  Post-procedure details:     Patient tolerance of procedure:  Tolerated well, no immediate complications

## 2025-01-11 NOTE — ASSESSMENT & PLAN NOTE
Lab Results   Component Value Date    EGFR 30 01/10/2025    EGFR 35 01/09/2025    EGFR 42 01/09/2025    CREATININE 1.48 (H) 01/10/2025    CREATININE 1.31 (H) 01/09/2025    CREATININE 1.13 01/09/2025     Avoid nephrotoxic medications  Avoid hypotension  Will monitor her renal indices and I&Os closely

## 2025-01-11 NOTE — UTILIZATION REVIEW
Continued Stay Review    Date: 1/11/25                          Current Patient Class: Inpatient  Current Level of Care: MED-SURG    HPI:89 y.o. female initially admitted on 1/9.     Assessment/Plan:   Date: 1/11  Day 3: Has surpassed a 2nd midnight with active treatments and services.Rales..WBC 16. HGB 9, HCT 28.4. PROCALCITONIN 26.73.   BUN 32, CREAT 1.69. AST 50.PHOS 5.7. improved on the BIPAP. This AM on Nasal cannula. Still with cough symptoms but non-productive at present.IV LABETOLOL. IV PPI, IV LASIX, IV CEFTRAIXONE.       Medications:   Scheduled Medications:  aspirin, 81 mg, Oral, Daily  atorvastatin, 40 mg, Oral, QPM  cefTRIAXone, 2,000 mg, Intravenous, Q24H  chlorhexidine, 15 mL, Mouth/Throat, Q12H JOSE  [Held by provider] furosemide, 40 mg, Intravenous, BID (diuretic)  heparin (porcine), 5,000 Units, Subcutaneous, Q8H JOSE  metoprolol succinate, 25 mg, Oral, Daily  oseltamivir, 30 mg, Oral, Q24H  pantoprazole, 40 mg, Intravenous, Q24H JOSE      Continuous IV Infusions:     PRN Meds:  acetaminophen, 650 mg, Oral, Q6H PRN  hydrOXYzine HCL, 10 mg, Oral, HS PRN  ipratropium-albuterol, 3 mL, Nebulization, Q6H PRN 1/11 x 1  labetalol, 10 mg, Intravenous, Q6H PRN 1/11 x 1  ondansetron, 4 mg, Intravenous, Q6H PRN      Discharge Plan: TBD    Vital Signs (last 3 days)       Date/Time Temp Pulse Resp BP MAP (mmHg) SpO2 FiO2 (%) O2 Flow Rate (L/min) O2 Device O2 Interface Device Patient Position - Orthostatic VS Pain    01/11/25 1553 -- -- -- -- -- 96 % -- -- -- HFNC prongs -- --    01/11/25 1530 -- -- -- -- -- -- 50 45 L/min High flow nasal cannula HFNC prongs -- --    01/11/25 1400 -- 84 32 125/60 86 96 % -- -- -- -- -- --    01/11/25 1331 -- -- -- -- -- 96 % -- -- -- Full face mask -- --    01/11/25 1300 -- 88 21 140/63 91 98 % -- -- -- -- -- --    01/11/25 1200 -- 90 16 130/58 84 96 % -- -- -- -- -- No Pain    01/11/25 1100 -- 96 20 133/58 89 96 % -- -- -- -- -- --    01/11/25 1000 -- 112 35 173/78 112 97 %  -- -- -- -- -- --    01/11/25 0900 -- 102 23 145/76 104 96 % -- -- -- -- -- --    01/11/25 0800 -- 109 31 170/102 127 99 % -- -- -- -- -- No Pain    01/11/25 0747 -- -- -- -- -- 99 % -- -- -- Full face mask -- --    01/11/25 0700 100.4 °F (38 °C) 101 26 156/84 114 97 % -- -- -- -- -- --    01/11/25 0600 -- 113 27 170/78 112 91 % -- -- -- -- -- --    01/11/25 0500 -- 85 23 158/76 109 98 % -- -- -- -- -- --    01/11/25 0400 -- 87 24 149/68 84 97 % -- -- BiPAP -- -- No Pain    01/11/25 0300 -- 80 20 144/67 96 97 % -- -- BiPAP -- -- --    01/11/25 0243 98.5 °F (36.9 °C) -- -- -- -- -- -- -- -- -- -- --    01/11/25 0200 -- 79 19 133/62 89 98 % -- -- BiPAP -- -- --    01/11/25 0100 -- 81 20 137/64 92 98 % -- -- -- -- -- --    01/11/25 0027 -- 82 11 134/58 83 96 % 60 -- BiPAP -- -- --    01/11/25 0022 -- -- -- -- -- 84 % -- 10 L/min -- -- -- --    01/11/25 0000 -- 85 25 134/65 93 99 % -- 10 L/min Mid flow nasal cannula -- -- No Pain    01/10/25 2300 -- 83 14 111/66 84 96 % -- -- -- -- -- --    01/10/25 2257 99.1 °F (37.3 °C) -- -- -- -- -- -- -- -- -- -- --    01/10/25 2200 -- 88 38 104/51 73 96 % -- -- -- -- -- --    01/10/25 2100 -- 95 12 112/62 82 97 % -- -- -- -- -- --    01/10/25 2003 -- -- -- -- -- 97 % -- -- -- Full face mask -- --    01/10/25 2000 -- 94 23 120/65 87 98 % -- -- -- -- -- No Pain    01/10/25 1900 -- 100 20 134/68 95 95 % 60 -- BiPAP -- -- --    01/10/25 1835 -- -- -- -- -- 96 % -- -- -- Full face mask -- --    01/10/25 17:17:51 -- -- 18 170/119 136 -- -- 4 L/min Simple mask -- Lying --    01/10/25 1619 -- -- -- -- -- 77 % -- -- -- -- -- --    01/10/25 11:08:39 97.6 °F (36.4 °C) 83 -- 127/50 76 96 % -- -- -- -- -- --    01/10/25 11:07:21 97.6 °F (36.4 °C) 55 22 127/50 76 97 % -- -- -- -- Lying --    01/10/25 0902 -- -- -- -- -- -- -- -- -- -- -- Med Not Given for Pain - for MAR use only    01/10/25 0850 -- -- -- -- -- -- -- -- -- -- -- No Pain    01/10/25 08:48:07 100.2 °F (37.9 °C) 92 22 151/60 90 95  % -- -- -- -- Lying --    01/10/25 04:59:20 98.1 °F (36.7 °C) 88 -- -- -- 98 % -- -- -- -- -- --    01/10/25 0355 -- -- -- -- -- 97 % -- -- -- -- -- --    01/10/25 03:24:39 99.6 °F (37.6 °C) 96 24 133/63 86 98 % -- 4 L/min Simple mask -- -- --    01/09/25 23:51:36 99.5 °F (37.5 °C) 62 22 143/54 84 95 % -- 4 L/min Simple mask -- Lying --    01/09/25 2130 -- -- -- -- -- 91 % -- -- -- -- -- --    01/09/25 2119 -- -- -- -- -- 100 % -- -- -- -- -- --    01/09/25 2101 -- -- -- -- -- -- -- -- -- -- -- No Pain    01/09/25 2052 -- -- -- -- -- 99 % -- -- -- -- -- --    01/09/25 2016 -- 100 -- 177/85 116 94 % -- -- -- -- -- --    01/09/25 19:09:13 98.9 °F (37.2 °C) 93 24 171/76 108 97 % -- -- -- -- -- --    01/09/25 15:24:20 98.7 °F (37.1 °C) 84 18 137/66 90 95 % -- -- -- -- -- --    01/09/25 1413 -- 94 -- 132/69 -- -- -- -- -- -- -- --    01/09/25 1317 -- -- -- -- -- -- -- -- -- -- -- No Pain    01/09/25 1314 -- -- 20 -- -- -- -- -- -- -- -- --    01/09/25 12:39:31 -- 62 -- 132/69 90 93 % -- -- -- -- -- --    01/09/25 1130 -- 94 22 148/67 97 97 % -- -- Nasal cannula -- Lying --    01/09/25 1030 -- 102 24 151/66 95 95 % -- -- -- -- -- --    01/09/25 0930 -- 86 20 189/84 121 98 % -- -- -- -- -- --    01/09/25 0915 -- 84 20 216/93 133 98 % -- -- -- -- -- --    01/09/25 0830 -- 82 22 207/82 118 98 % -- -- Nasal cannula -- -- --    01/09/25 0733 98.3 °F (36.8 °C) 94 20 226/117 -- 95 % -- -- None (Room air) -- -- --          Weight (last 2 days)       Date/Time Weight    01/11/25 0600 61 (134.48)    01/10/25 0539 58.5 (129)    01/09/25 1413 61.2 (135)    01/09/25 1316 61.5 (135.58)    01/09/25 0900 57 (125.66)            Pertinent Labs/Diagnostic Results:   Radiology:  XR chest portable   Final Interpretation by Michael Arredondo MD (01/10 0816)   CHF with bibasilar pleural effusions noted and probable compressive atelectasis.      Workstation performed: [id number]         XR chest 2 views   ED Interpretation by Alejandra Reynoso DO  (01/09 0852)   FINDINGS:     Interstitial edema. Bilateral lower lobe compressive atelectasis.  No pneumothorax. Small bilateral pleural effusions.     Normal cardiomediastinal silhouette.     Degenerative disease of the glenohumeral and acromioclavicular joints.     Normal upper abdomen.     IMPRESSION:     Interstitial edema and small effusions.           Final Interpretation by Debbi Forde MD (01/09 0849)      Interstitial edema and small effusions.            Workstation performed: [id number]         XR chest portable    (Results Pending)   XR chest portable ICU    (Results Pending)     Cardiology:  Echo complete w/ contrast if indicated   Final Result by Don Keller DO (01/10 0924)        Left Ventricle: Left ventricular cavity size is normal. There is    moderate concentric hypertrophy. The left ventricular ejection fraction is    72% by visual estimation. Systolic function is hyperdynamic. Wall motion    is normal. Diastolic function is moderately abnormal, consistent with    grade II (pseudonormal) relaxation.  Left atrial filling pressure is    elevated. There is a prominent myocardial speckle pattern.     Right Ventricle: Right ventricular cavity size is normal. Systolic    function is normal.     Left Atrium: The atrium is severely dilated.     Right Atrium: The atrium is mildly dilated.     Aortic Valve: There is mild regurgitation. There is aortic valve    sclerosis.The aortic valve mean gradient is 8 mmHg. The dimensionless    velocity index is 0.76. The aortic valve area is 2.42 cm2.     Mitral Valve: There is moderate annular calcification. There is mild to    moderate regurgitation.     Tricuspid Valve: There is trace regurgitation. Pulmonary artery    systolic pressures are estimated at 46 mmHg.     Pericardium: There is a small pericardial effusion. There is no    echocardiographic evidence of tamponade. There is a moderately sized left    pleural effusion.     There is no  study for comparison.         ECG 12 lead   Final Result by Sanket Jarvis MD (01/09 1154)   Age and gender specific ECG analysis    Sinus rhythm with Premature atrial complexes   Nonspecific ST and T wave abnormality   Abnormal ECG   When compared with ECG of 09-Jan-2025 07:34, (unconfirmed)   No significant change was found   Confirmed by Sanket Jarvis ([id number]) on 1/9/2025 11:54:00 AM      ECG 12 lead   Final Result by Sanket Jarvis MD (01/09 1155)   Age and gender specific ECG analysis    Sinus tachycardia with 1st degree A-V block with Premature atrial    complexes   Otherwise normal ECG   No previous ECGs available   Confirmed by Sanket Jarvis ([id number]) on 1/9/2025 11:55:42 AM          Results from last 7 days   Lab Units 01/09/25  2219   SARS-COV-2  Not Detected     Results from last 7 days   Lab Units 01/11/25  0631 01/10/25  2000 01/10/25  0504 01/09/25 2221 01/09/25  0749   WBC Thousand/uL 16.00*  --  11.65* 14.22* 13.49*   HEMOGLOBIN g/dL 9.0*  --  8.2* 8.8* 9.0*   HEMATOCRIT % 28.4*  --  25.7* 27.1* 28.3*   PLATELETS Thousands/uL 703* 586* 673* 748* 839*   TOTAL NEUT ABS Thousands/µL 13.09*  --  9.80* 12.21* 10.28*     Results from last 7 days   Lab Units 01/10/25  0504   RETIC CT ABS  53,200   RETIC CT PCT % 1.90*     Results from last 7 days   Lab Units 01/11/25  0631 01/10/25  0504 01/09/25  2221 01/09/25  0751   SODIUM mmol/L 138 139 138 139   POTASSIUM mmol/L 4.4 4.2 3.7 4.0   CHLORIDE mmol/L 101 104 102 105   CO2 mmol/L 28 27 27 26   ANION GAP mmol/L 9 8 9 8   BUN mg/dL 32* 27* 26* 27*   CREATININE mg/dL 1.69* 1.48* 1.31* 1.13   EGFR ml/min/1.73sq m 26 30 35 42   CALCIUM mg/dL 8.6 8.5 8.6 8.7   MAGNESIUM mg/dL 2.3 2.2 2.2  --    PHOSPHORUS mg/dL 5.7* 4.7*  --   --      Results from last 7 days   Lab Units 01/11/25  0631 01/10/25  0504 01/09/25  0751   AST U/L 50* 23 24   ALT U/L 29 14 17   ALK PHOS U/L 69 61 70   TOTAL PROTEIN g/dL 6.4 6.1* 6.7   ALBUMIN g/dL 4.0 3.7 4.1   TOTAL BILIRUBIN mg/dL  0.29 0.33 0.46         Results from last 7 days   Lab Units 01/11/25  0631 01/10/25  0504 01/09/25  2221 01/09/25  0751   GLUCOSE RANDOM mg/dL 132 118 177* 129         Results from last 7 days   Lab Units 01/10/25  1910   PH ART  7.347*   PCO2 ART mm Hg 43.4   PO2 ART mm Hg 64.6*   HCO3 ART mmol/L 23.3   BASE EXC ART mmol/L -2.3   O2 CONTENT ART mL/dL 12.6*   O2 HGB, ARTERIAL % 91.1*     Results from last 7 days   Lab Units 01/10/25  1929   PH BRADY  7.349   PCO2 BRADY mm Hg 47.2   PO2 BRADY mm Hg 61.2*   HCO3 BRADY mmol/L 25.4   BASE EXC BRADY mmol/L -0.4   O2 CONTENT BRADY ml/dL 12.4   O2 HGB, VENOUS % 89.5*             Results from last 7 days   Lab Units 01/09/25  1713 01/09/25  0944 01/09/25  0751   HS TNI 0HR ng/L  --   --  4   HS TNI 2HR ng/L  --  8  --    HSTNI D2 ng/L  --  4  --    HS TNI 4HR ng/L 20  --   --    HSTNI D4 ng/L 16  --   --      Results from last 7 days   Lab Units 01/09/25  0827   D-DIMER QUANTITATIVE ug/ml FEU 0.90*             Results from last 7 days   Lab Units 01/11/25  0631 01/09/25  2221   PROCALCITONIN ng/ml 26.73* 0.19     Results from last 7 days   Lab Units 01/10/25  1927 01/09/25  2221   LACTIC ACID mmol/L 0.9 1.8             Results from last 7 days   Lab Units 01/09/25  0749   BNP pg/mL 545*       Results from last 7 days   Lab Units 01/09/25  2219   INFLUENZA B  Not Detected   RESPIRATORY SYNCYTIAL VIRUS  Not Detected     Results from last 7 days   Lab Units 01/09/25 2219   ADENOVIRUS  Not Detected   BORDETELLA PARAPERTUSSIS  Not Detected   BORDETELLA PERTUSSIS  Not Detected   CHLAMYDIA PNEUMONIAE  Not Detected   CORONAVIRUS 229E  Not Detected   CORONAVIRUS HKU1  Not Detected   CORONAVIRUS NL63  Not Detected   CORONAVIRUS OC43  Not Detected   METAPNEUMOVIRUS  Not Detected   RHINOVIRUS  Not Detected   MYCOPLASMA PNEUMONIAE  Not Detected   PARAINFLUENZA 1  Not Detected   PARAINFLUENZA 2  Not Detected   PARAINFLUENZA 3  Not Detected   PARAINFLUENZA 4  Not Detected                          Results from last 7 days   Lab Units 01/09/25  3949   BLOOD CULTURE  No Growth at 24 hrs.  No Growth at 24 hrs.       Network Utilization Review Department  ATTENTION: Please call with any questions or concerns to [phone] and carefully listen to the prompts so that you are directed to the right person. All voicemails are confidential.   For Discharge needs, contact Care Management DC Support Team at [phone] opt. 2  Send all requests for admission clinical reviews, approved or denied determinations and any other requests to dedicated fax number below belonging to the [place] where the patient is receiving treatment. List of dedicated fax numbers for the Facilities:  FACILITY NAME UR FAX NUMBER   ADMISSION DENIALS (Administrative/Medical Necessity) [phone]   DISCHARGE SUPPORT TEAM (NETWORK) [phone]   PARENT CHILD HEALTH (Maternity/NICU/Pediatrics) [phone]   [facility] [phone]   [facility] [phone]   Pending sale to [facility] [phone]   [facility] [phone]   Critical access hospital [phone]   [facility] [phone]   [facility] [phone]   [facility] [phone]   [facility] [phone]   [facility], [facility] [phone]   [facility] [phone]   [facility] [phone]

## 2025-01-11 NOTE — QUICK NOTE
I had a discussion this morning with the patient's son Christiano, I informed him that his mother was awake and alert and oriented x 4.  He informed me that she had a advanced directive that was signed 4 years ago saying that her and her  wanted no heroic measures performed.  We talked about his mom possibly changing her mind and wanting everything done and he agreed that if she should change her mind that we should go ahead and follow her wishes.    I had a long discussion with the patient this morning while she was alert and oriented.  I explained to her that as it stood then if she did not want me to change her CODE STATUS that if she were to decompensate we would not do anything in terms of intubation or CPR.  I further asked her then if she would want me to do everything to save her life or if she would want me to treat her but not to any heroic measures.  She told me she would want me to do everything to save her life including CPR.  We did discuss CPR and intubation.  In an effort of completeness I called her son and left a message for him letting him know that his mother had decompensated this evening and her pH was 7.1 that she would require intubation.  Earlier he did give me permission to leave information about his mother on his voicemail.  Shortly before intubation the patient's granddaughter called with her uncle Christiano on the phone I explained the situation to them and they both agreed to allow us to go ahead and intubate Ms. Morales.  We intubated the patient with a 7.0 tube as she is a small woman.  The patient's son-in-law was in the ICU at the time of intubation.

## 2025-01-11 NOTE — ASSESSMENT & PLAN NOTE
Wt Readings from Last 3 Encounters:   01/10/25 58.5 kg (129 lb)   01/08/25 56.7 kg (125 lb)   10/23/24 56.3 kg (124 lb 3.2 oz)     Continuous cardiopulmonary monitoring  BiPAP/oxygen as needed  Consider diuresis again today

## 2025-01-11 NOTE — PLAN OF CARE
Problem: Potential for Falls  Goal: Patient will remain free of falls  Description: INTERVENTIONS:  - Educate patient/family on patient safety including physical limitations  - Instruct patient to call for assistance with activity   - Consult OT/PT to assist with strengthening/mobility   - Keep Call bell within reach  - Keep bed low and locked with side rails adjusted as appropriate  - Keep care items and personal belongings within reach  - Initiate and maintain comfort rounds  - Make Fall Risk Sign visible to staff  - Offer Toileting every 2 Hours, in advance of need  - Initiate/Maintain bed alarm  - Obtain necessary fall risk management equipment  - Apply yellow socks and bracelet for high fall risk patients  - Consider moving patient to room near nurses station  Outcome: Progressing     Problem: PAIN - ADULT  Goal: Verbalizes/displays adequate comfort level or baseline comfort level  Description: Interventions:  - Encourage patient to monitor pain and request assistance  - Assess pain using appropriate pain scale  - Administer analgesics based on type and severity of pain and evaluate response  - Implement non-pharmacological measures as appropriate and evaluate response  - Consider cultural and social influences on pain and pain management  - Notify physician/advanced practitioner if interventions unsuccessful or patient reports new pain  Outcome: Progressing     Problem: Knowledge Deficit  Goal: Patient/family/caregiver demonstrates understanding of disease process, treatment plan, medications, and discharge instructions  Description: Complete learning assessment and assess knowledge base.  Interventions:  - Provide teaching at level of understanding  - Provide teaching via preferred learning methods  Outcome: Progressing     Problem: RESPIRATORY - ADULT  Goal: Achieves optimal ventilation and oxygenation  Description: INTERVENTIONS:  - Assess for changes in respiratory status  - Assess for changes in  mentation and behavior  - Position to facilitate oxygenation and minimize respiratory effort  - Oxygen administered by appropriate delivery if ordered  - Initiate smoking cessation education as indicated  - Encourage broncho-pulmonary hygiene including cough, deep breathe, Incentive Spirometry  - Assess the need for suctioning and aspirate as needed  - Assess and instruct to report SOB or any respiratory difficulty  - Respiratory Therapy support as indicated  Outcome: Progressing     Problem: SKIN/TISSUE INTEGRITY - ADULT  Goal: Skin Integrity remains intact(Skin Breakdown Prevention)  Description: Assess:  -Perform Freddy assessment every shift  -Clean and moisturize skin every 2 hours and PRN  -Inspect skin when repositioning, toileting, and assisting with ADLS  -Assess under medical devices  -Assess extremities for adequate circulation and sensation     Bed Management:  -Have minimal linens on bed & keep smooth, unwrinkled  -Change linens as needed when moist or perspiring  -Avoid sitting or lying in one position for more than 2 hours while in bed  -Keep HOB at 30 degrees     Toileting:  -Offer bedside commode  -Assess for incontinence every 2 hours and PRN  -Use incontinent care products after each incontinent episode    Activity:  -Mobilize patient 3 times a day  -Encourage activity and walks on unit  -Encourage or provide ROM exercises   -Turn and reposition patient every 2 Hours  -Use appropriate equipment to lift or move patient in bed  -Instruct/ Assist with weight shifting every 2 hours when out of bed in chair  -Consider limitation of chair time 2 hour intervals    Skin Care:  -Avoid use of baby powder, tape, friction and shearing, hot water or constrictive clothing  -Relieve pressure over bony prominences   -Do not massage red bony areas    Next Steps:  -Teach patient strategies to minimize risks    -Consider consults to  interdisciplinary teams  Outcome: Progressing     Problem: MUSCULOSKELETAL -  ADULT  Goal: Maintain or return mobility to safest level of function  Description: INTERVENTIONS:  - Assess patient's ability to carry out ADLs; assess patient's baseline for ADL function and identify physical deficits which impact ability to perform ADLs (bathing, care of mouth/teeth, toileting, grooming, dressing, etc.)  - Assess/evaluate cause of self-care deficits   - Assess range of motion  - Assess patient's mobility  - Assess patient's need for assistive devices and provide as appropriate  - Encourage maximum independence but intervene and supervise when necessary  - Involve family in performance of ADLs  - Assess for home care needs following discharge   - Consider OT consult to assist with ADL evaluation and planning for discharge  - Provide patient education as appropriate  Outcome: Progressing  Goal: Maintain proper alignment of affected body part  Description: INTERVENTIONS:  - Support, maintain and protect limb and body alignment  - Provide patient/ family with appropriate education  Outcome: Progressing     Problem: Prexisting or High Potential for Compromised Skin Integrity  Goal: Skin integrity is maintained or improved  Description: INTERVENTIONS:  - Identify patients at risk for skin breakdown  - Assess and monitor skin integrity  - Assess and monitor nutrition and hydration status  - Monitor labs   - Assess for incontinence   - Turn and reposition patient  - Assist with mobility/ambulation  - Relieve pressure over bony prominences  - Avoid friction and shearing  - Provide appropriate hygiene as needed including keeping skin clean and dry  - Evaluate need for skin moisturizer/barrier cream  - Collaborate with interdisciplinary team   - Patient/family teaching  - Consider wound care consult   Outcome: Progressing     Problem: Nutrition/Hydration-ADULT  Goal: Nutrient/Hydration intake appropriate for improving, restoring or maintaining nutritional needs  Description: Monitor and assess patient's  nutrition/hydration status for malnutrition. Collaborate with interdisciplinary team and initiate plan and interventions as ordered.  Monitor patient's weight and dietary intake as ordered or per policy. Utilize nutrition screening tool and intervene as necessary. Determine patient's food preferences and provide high-protein, high-caloric foods as appropriate.     INTERVENTIONS:  - Monitor oral intake, urinary output, labs, and treatment plans  - Assess nutrition and hydration status and recommend course of action  - Evaluate amount of meals eaten  - Assist patient with eating if necessary   - Allow adequate time for meals  - Recommend/ encourage appropriate diets, oral nutritional supplements, and vitamin/mineral supplements  - Order, calculate, and assess calorie counts as needed  - Recommend, monitor, and adjust tube feedings and TPN/PPN based on assessed needs  - Assess need for intravenous fluids  - Provide specific nutrition/hydration education as appropriate  - Include patient/family/caregiver in decisions related to nutrition  Outcome: Progressing

## 2025-01-11 NOTE — PROGRESS NOTES
Progress Note - Critical Care/ICU   Name: Nilsa Morales 89 y.o. female I MRN: [id number]  Unit/Bed#: ICU 06 I Date of Admission: 1/9/2025   Date of Service: 1/11/2025 I Hospital Day: 2      Assessment & Plan  Acute respiratory failure with hypoxia (HCC)  Patient became hypoxic post vomiting likely secondary to aspiration  Significant tachypnea  Hypoxia  Patient is a level 1 full code confirmed with patient's daughter    Plan  Transferred to the intensive care unit but improved on arrival  Placed on BIPAP  Tamiflu, day 2  IV antibiotics for possible aspiration as a contributing factor  Chest x-ray in a.m.  Acute on chronic diastolic (congestive) heart failure (HCC)  Wt Readings from Last 3 Encounters:   01/10/25 58.5 kg (129 lb)   01/08/25 56.7 kg (125 lb)   10/23/24 56.3 kg (124 lb 3.2 oz)     Continuous cardiopulmonary monitoring  BiPAP/oxygen as needed  Consider diuresis again today          History of paroxysmal atrial tachycardia  Continuous cardiopulmonary monitoring  Continue beta-blockade    Anemia  Monitor hemoglobin  Transfuse as needed  Essential hypertension  Continuous cardiopulmonary monitoring  Maintain MAP of 65 mmHg  Will use IV labetalol for now to help control blood pressure, consider Cardene drip if needed  Lumbar stenosis with neurogenic claudication  Monitor pain  Treat as needed  Pericardial effusion  Cardiology following  No evidence of tamponade  History of stroke, 2020  Continue neuro checks    Stage 3 chronic kidney disease, unspecified whether stage 3a or 3b CKD ([facility])  Lab Results   Component Value Date    EGFR 30 01/10/2025    EGFR 35 01/09/2025    EGFR 42 01/09/2025    CREATININE 1.48 (H) 01/10/2025    CREATININE 1.31 (H) 01/09/2025    CREATININE 1.13 01/09/2025     Avoid nephrotoxic medications  Avoid hypotension  Will monitor her renal indices and I&Os closely  Disposition: Stepdown Level 1    ICU Core Measures     A: Assess, Prevent, and Manage Pain Has pain been assessed? Yes  Need  for changes to pain regimen? No   B: Both SAT/SAT  N/A   C: Choice of Sedation RASS Goal: N/A patient not on sedation  Need for changes to sedation or analgesia regimen? NA   D: Delirium CAM-ICU: Negative   E: Early Mobility  Plan for early mobility? Yes   F: Family Engagement Plan for family engagement today? Yes       Antibiotic Review: Patient on appropriate coverage based on culture data.       Prophylaxis:  VTE VTE covered by:  heparin (porcine), Subcutaneous, 5,000 Units at 01/11/25 0534       Stress Ulcer  not ordered         24 Hour Events : Events of transfer are noted- improved on the BIPAP. This AM on Nasal cannula. Still with cough symptoms but non-productive at present  Subjective   Review of Systems: See HPI for Review of Systems    Objective :                   Vitals I/O      Most Recent Min/Max in 24hrs   Temp 98.5 °F (36.9 °C) Temp  Min: 97.6 °F (36.4 °C)  Max: 100.2 °F (37.9 °C)   Pulse 85 Pulse  Min: 55  Max: 100   Resp (!) 23 Resp  Min: 11  Max: 38   /76 BP  Min: 104/51  Max: 170/119   O2 Sat 98 % (Simultaneous filing. User may not have seen previous data.) SpO2  Min: 77 %  Max: 99 %      Intake/Output Summary (Last 24 hours) at 1/11/2025 0543  Last data filed at 1/10/2025 2330  Gross per 24 hour   Intake 260 ml   Output 100 ml   Net 160 ml       Diet NPO    Invasive Monitoring           Physical Exam   Physical Exam  Eyes:      Pupils: Pupils are equal, round, and reactive to light.   Skin:     General: Skin is warm and dry.   HENT:      Head: Normocephalic.      Mouth/Throat:      Mouth: Mucous membranes are dry.   Cardiovascular:      Rate and Rhythm: Normal rate.   Abdominal:      Palpations: Abdomen is soft.      Tenderness: There is no abdominal tenderness.   Constitutional:       Appearance: She is ill-appearing.   Pulmonary:      Effort: Pulmonary effort is normal.      Breath sounds: Rales present.   Neurological:      General: No focal deficit present.      Motor: gross motor  function is at baseline for patient. Strength full and intact in all extremities.          Diagnostic Studies      Lab Results: I have reviewed the following results:     Medications:  Scheduled PRN   aspirin, 81 mg, Daily  atorvastatin, 40 mg, QPM  cefTRIAXone, 2,000 mg, Q24H  chlorhexidine, 15 mL, Q12H JOSE  [Held by provider] furosemide, 40 mg, BID (diuretic)  heparin (porcine), 5,000 Units, Q8H JOSE  melatonin, 6 mg, HS  metoprolol succinate, 25 mg, Daily  oseltamivir, 30 mg, Q24H      acetaminophen, 650 mg, Q6H PRN  hydrOXYzine HCL, 10 mg, HS PRN  ipratropium-albuterol, 3 mL, Q6H PRN  ondansetron, 4 mg, Q6H PRN       Continuous          Labs:   CBC    Recent Labs     01/09/25  2221 01/10/25  0504 01/10/25  2000   WBC 14.22* 11.65*  --    HGB 8.8* 8.2*  --    HCT 27.1* 25.7*  --    * 673* 586*     BMP    Recent Labs     01/09/25  2221 01/10/25  0504   SODIUM 138 139   K 3.7 4.2    104   CO2 27 27   AGAP 9 8   BUN 26* 27*   CREATININE 1.31* 1.48*   CALCIUM 8.6 8.5       Coags    No recent results     Additional Electrolytes  Recent Labs     01/09/25  2221 01/10/25  0504   MG 2.2 2.2   PHOS  --  4.7*          Blood Gas    Recent Labs     01/10/25  1910   PHART 7.347*   JGX1PCR 43.4   PO2ART 64.6*   ZSF3JZV 23.3   BEART -2.3     Recent Labs     01/10/25  1929   PHVEN 7.349   BSU4DGY 47.2   PO2VEN 61.2*   ADJ1GAZ 25.4   BEVEN -0.4   A2AMJVQ 89.5*    LFTs  Recent Labs     01/09/25  0751 01/10/25  0504   ALT 17 14   AST 24 23   ALKPHOS 70 61   ALB 4.1 3.7   TBILI 0.46 0.33       Infectious  Recent Labs     01/09/25 2221   PROCALCITONI 0.19     Glucose  Recent Labs     01/09/25  0751 01/09/25  2221 01/10/25  0504   GLUC 129 177* 118

## 2025-01-11 NOTE — ASSESSMENT & PLAN NOTE
Patient became hypoxic post vomiting likely secondary to aspiration  Significant tachypnea  Hypoxia  Patient is a level 1 full code confirmed with patient's daughter    Plan  Transferred to the intensive care unit but improved on arrival  Placed on BIPAP  Tamiflu, day 2  IV antibiotics for possible aspiration as a contributing factor  Chest x-ray in a.m.

## 2025-01-11 NOTE — QUICK NOTE
"This patient meets SIRS Criteria and may be septic.  SIRS = Systemic Inflammatory Response Syndrome  Sepsis times zero alert received    \"Treating Associated Illness\" if the patient is being treated for another illness that is a known cause of these abnormalities, The patient's lactic acid level is 0.9. she is being treated with antibiotics for possible aspiration pneumonia    The recent clinical data is shown below.  Vitals:    01/10/25 1835 01/10/25 1900 01/10/25 2000 01/10/25 2003   BP:  134/68 120/65    BP Location:       Pulse:  100 94    Resp:  20 (!) 23    Temp:       TempSrc:       SpO2: 96% 95% 98% 97%   Weight:       Height:         "

## 2025-01-12 PROBLEM — J18.9 PNEUMONIA: Status: ACTIVE | Noted: 2025-01-01

## 2025-01-12 PROBLEM — J10.1 INFLUENZA A: Status: ACTIVE | Noted: 2025-01-01

## 2025-01-12 NOTE — ASSESSMENT & PLAN NOTE
Wt Readings from Last 3 Encounters:   01/11/25 61 kg (134 lb 7.7 oz)   01/08/25 56.7 kg (125 lb)   10/23/24 56.3 kg (124 lb 3.2 oz)     Decompensated and required intubation 1/11  Likely related to right sided infiltrate  We will monitor her volume status closely

## 2025-01-12 NOTE — OCCUPATIONAL THERAPY NOTE
Occupational Therapy         Patient Name: Nilsa Morales  Today's Date: 1/12/2025 01/12/25 1211   OT Last Visit   OT Visit Date 01/12/25   Note Type   Note type Cancelled Session;Evaluation   Cancel Reasons Intubated/sedated     Deloris aGllegos

## 2025-01-12 NOTE — PHYSICAL THERAPY NOTE
Physical Therapy Cancellation Note:    Per pts medical chart documentation, pt is currently intubated (01/11/25). Cancel PT services for today and will cont to follow as able and once pt is medically appropriate and medically stable.

## 2025-01-12 NOTE — PROGRESS NOTES
Progress Note - Critical Care/ICU   Name: Nilsa Morales 89 y.o. female I MRN: [id number]  Unit/Bed#: ICU 06 I Date of Admission: 1/9/2025   Date of Service: 1/12/2025 I Hospital Day: 3      Assessment & Plan  Acute respiratory failure with hypoxia (HCC)  Patient became hypoxic post vomiting likely secondary to aspiration  Significant tachypnea  Hypoxia  Patient is a level 1 full code confirmed with patient's daughter    Plan  Initially improved on BIPAP but again deteriorated with somnolence and evidence of hypercarbia  Was intubated 1/11  CXR reveals right sided infiltrate  We will continue vent support for now  Acute on chronic diastolic (congestive) heart failure (HCC)  Wt Readings from Last 3 Encounters:   01/11/25 61 kg (134 lb 7.7 oz)   01/08/25 56.7 kg (125 lb)   10/23/24 56.3 kg (124 lb 3.2 oz)     Decompensated and required intubation 1/11  Likely related to right sided infiltrate  We will monitor her volume status closely          History of paroxysmal atrial tachycardia  Continuous cardiopulmonary monitoring  Continue beta-blockade    Anemia  Monitor hemoglobin  Transfuse as needed  Essential hypertension  Continuous cardiopulmonary monitoring  Maintain MAP of 65 mmHg    Lumbar stenosis with neurogenic claudication  Monitor pain  Treat as needed  Pericardial effusion  Cardiology following  No evidence of tamponade  History of stroke, 2020  Continue neuro checks    Stage 3 chronic kidney disease, unspecified whether stage 3a or 3b CKD ([facility])  Lab Results   Component Value Date    EGFR 26 01/11/2025    EGFR 30 01/10/2025    EGFR 35 01/09/2025    CREATININE 1.69 (H) 01/11/2025    CREATININE 1.48 (H) 01/10/2025    CREATININE 1.31 (H) 01/09/2025     Avoid nephrotoxic medications  Avoid hypotension  Will monitor her renal indices and I&Os closely  Disposition: Critical care    ICU Core Measures     Vented Patient  VAP Bundle  VAP bundle ordered     A: Assess, Prevent, and Manage Pain Has pain been assessed?  Yes  Need for changes to pain regimen? No   B: Both Spontaneous Awakening Trials (SATs) and Spontaneous Breathing Trials (SBTs) Plan to perform spontaneous awakening trial today? Yes   Plan to perform spontaneous breathing trial today? Yes   Obvious barriers to extubation? No   C: Choice of Sedation RASS Goal: 0 Alert and Calm  Need for changes to sedation or analgesia regimen? No   D: Delirium CAM-ICU: Negative   E: Early Mobility  Plan for early mobility? Yes   F: Family Engagement Plan for family engagement today? Yes       Antibiotic Review: Patient on appropriate coverage based on culture data.     Review of Invasive Devices:    [place] Plan: Continue for accurate I/O monitoring for 48 hours        Prophylaxis:  VTE VTE covered by:  heparin (porcine), Subcutaneous, 5,000 Units at 01/11/25 2242       Stress Ulcer  covered bypantoprazole (PROTONIX) injection 40 mg [[id number]]       24 Hour Events : events of yesterday afternoon are noted- intubated for respiratory failure  Subjective   Review of Systems: Review of Systems not obtainable due to Altered mental status    Objective :                   Vitals I/O      Most Recent Min/Max in 24hrs   Temp 97.5 °F (36.4 °C) Temp  Min: 97.5 °F (36.4 °C)  Max: 100.4 °F (38 °C)   Pulse 71 Pulse  Min: 69  Max: 133   Resp 16 Resp  Min: 16  Max: 46   /54 BP  Min: 82/47  Max: 173/78   O2 Sat 99 % SpO2  Min: 69 %  Max: 100 %      Intake/Output Summary (Last 24 hours) at 1/12/2025 0521  Last data filed at 1/12/2025 0200  Gross per 24 hour   Intake 76 ml   Output 225 ml   Net -149 ml       Diet Clear Liquid    Invasive Monitoring           Physical Exam   Physical Exam  Eyes:      Pupils: Pupils are equal, round, and reactive to light.   Skin:     General: Skin is warm and dry.   HENT:      Head: Normocephalic.      Mouth/Throat:      Mouth: Mucous membranes are dry.   Cardiovascular:      Rate and Rhythm: Normal rate.   Abdominal:      Palpations: Abdomen is soft.       Tenderness: There is no abdominal tenderness.   Constitutional:       Appearance: She is ill-appearing.   Pulmonary:      Effort: Pulmonary effort is normal.      Breath sounds: Rales present.   Neurological:      Comments: Sedated, opens eyes to name. Follows simple commands          Diagnostic Studies      Lab Results: I have reviewed the following results:     Medications:  Scheduled PRN   aspirin, 81 mg, Daily  atorvastatin, 40 mg, QPM  cefTRIAXone, 2,000 mg, Q24H  chlorhexidine, 15 mL, Q12H JOSE  [Held by provider] furosemide, 40 mg, BID (diuretic)  heparin (porcine), 5,000 Units, Q8H JOSE  metoprolol succinate, 25 mg, Daily  oseltamivir, 30 mg, Q24H  pantoprazole, 40 mg, Q24H JOSE  sodium chloride, 4 mL, Q6H      acetaminophen, 650 mg, Q6H PRN  fentaNYL, 50 mcg, Q1H PRN  hydrOXYzine HCL, 10 mg, HS PRN  ipratropium-albuterol, 3 mL, Q6H PRN  labetalol, 10 mg, Q6H PRN  ondansetron, 4 mg, Q6H PRN       Continuous    propofol, 5-50 mcg/kg/min, Last Rate: 30 mcg/kg/min (01/12/25 0106)         Labs:   CBC    Recent Labs     01/10/25  2000 01/11/25  0631   WBC  --  16.00*   HGB  --  9.0*   HCT  --  28.4*   * 703*     BMP    Recent Labs     01/11/25  0631   SODIUM 138   K 4.4      CO2 28   AGAP 9   BUN 32*   CREATININE 1.69*   CALCIUM 8.6       Coags    No recent results     Additional Electrolytes  Recent Labs     01/11/25  0631   MG 2.3   PHOS 5.7*          Blood Gas    Recent Labs     01/11/25  2130   PHART 7.408   FOW1PHG 40.1   PO2ART 292.5*   EBX2ZFE 24.7   BEART 0.1   SOURCE Radial, Right     Recent Labs     01/10/25  1929 01/11/25  1654 01/11/25  2130   PHVEN 7.349  --   --    RAU2ZVJ 47.2  --   --    PO2VEN 61.2*  --   --    QQT1VTV 25.4  --   --    BEVEN -0.4  --   --    R1KMLOU 89.5*  --   --    SOURCE  --    < > Radial, Right    < > = values in this interval not displayed.    LFTs  Recent Labs     01/11/25  0631   ALT 29   AST 50*   ALKPHOS 69   ALB 4.0   TBILI 0.29       Infectious  Recent Labs      01/11/25  0631   PROCALCITONI 26.73*     Glucose  Recent Labs     01/11/25  0631   GLUC 132

## 2025-01-12 NOTE — RESPIRATORY THERAPY NOTE
Per provider order, obtained abg. Per provider and abg results, decreased fio2 to 60%, no other changes made

## 2025-01-12 NOTE — RESPIRATORY THERAPY NOTE
01/12/25 0832   Respiratory Assessment   Resp Comments Patient recieved on current settings   Vent Information   Vent ID 1   Vent type Rodriguez [phone]   Rodriguez Vent Mode (S)CMV   $ Pulse Oximetry Spot Check Charge Completed   SpO2 98 %   (S)CMV Settings   Resp Rate (BPM) 16 BPM   VT (mL) 350 mL   FIO2 (%) 50 %   PEEP (cmH2O) 6 cmH2O   I:E Ratio 1:2.8   Insp Time (%) 1 %   Flow Trigger (LPM) 2   Humidification Heater   Heater Temperature (Set) 98.6 °F (37 °C)   Pause Time (%) 0 %   (S)CMV Actuals   Resp Rate (BPM) 16 BPM   VT (mL) 335   MV 5.4   MAP (cmH2O) 8.8 cmH2O   Peak Pressure (cmH2O) 19 cmH2O   I:E Ratio (Obs) 1:2.8   Insp Resistance 9   Heater Temperature (Obs) 98.6 °F (37 °C)   Static Compliance (mL/cmH20) 31.9 mL/cmH2O   (S)CMV Alarms   High Peak Pressure (cmH2O) 50   Low Pressure (cmH2O) 5 cm H2O   High Resp Rate (BPM) 50 BPM   Low Resp Rate (BPM) 5 BPM   High MV (L/min) 20 L/min   Low MV (L/min) 3 L/min   High VT (mL) 1000 mL   Low VT (mL) 250 mL   Apnea Time (s) 20 S   Maintenance   Alarm (pink) cable attached No   Resuscitation bag with peep valve at bedside Yes   Water bag changed No   Circuit changed No   Daily Screen   Patient safety screen outcome: Failed   Not Ready for Weaning due to: Underline problem not resolved   IHI Ventilator Associated Pneumonia Bundle   Daily Assessment of Readiness to Extubate Yes   ETT  Cuffed 7 mm   Placement Date/Time: 01/11/25 c) 6258   Type: Cuffed  Tube Size: 7 mm  Insertion attempts: 2  Placement Verification: End tidal CO2  Secured at (cm): 22   Secured at (cm) 22   Measured from Lips   Secured Location Left   Repositioned Center to Left   Secured by Commercial tube lizama   Site Condition Dry   Cuff Pressure (color) Green   HI-LO Suction  Intermittent suction   HI-LO Secretions Scant   HI-LO Intervention Patent

## 2025-01-12 NOTE — ASSESSMENT & PLAN NOTE
Patient became hypoxic postvomiting most likely secondary to aspiration yesterday  Required intubation

## 2025-01-12 NOTE — ASSESSMENT & PLAN NOTE
Neurohormonal Blockade:  --Beta Blocker: Toprol 25 mg QD  --ARNi / ACEi / ARB: None  --Aldosterone Antagonist: None  --SGLT2 Inhibitor: None  --Home Diuretic: None  --Inpatient Diuretic: None    Prior dry weight ~ 120#.  Present weighed 129#.  Down 5# from yesterday on bed scale  Last 24-hour urine output 325 cc, -205 cc  LVEF 72%, moderate LVH, grade 2 diastolic dysfunction with elevated LAP, speckled appearance to myocardium with significant thickening-cannot rule out amyloidosis, severe LAE and mild RA dilatation, mild AR, AV sclerosis, moderate MAC, mild to moderate MR, trace TR with PASP 46 mmHg, small pericardial effusion, moderate pleural effusion, January 9, 2025

## 2025-01-12 NOTE — PROGRESS NOTES
Progress Note - Cardiology   Name: Nilsa Morales 89 y.o. female I MRN: [id number]  Unit/Bed#: ICU 06 I Date of Admission: 1/9/2025   Date of Service: 1/12/2025 I Hospital Day: 3    Assessment & Plan  Acute on chronic diastolic (congestive) heart failure (HCC)  Neurohormonal Blockade:  --Beta Blocker: Toprol 25 mg QD  --ARNi / ACEi / ARB: None  --Aldosterone Antagonist: None  --SGLT2 Inhibitor: None  --Home Diuretic: None  --Inpatient Diuretic: None    Prior dry weight ~ 120#.  Present weighed 129#.  Down 5# from yesterday on bed scale  Last 24-hour urine output 325 cc, -205 cc  LVEF 72%, moderate LVH, grade 2 diastolic dysfunction with elevated LAP, speckled appearance to myocardium with significant thickening-cannot rule out amyloidosis, severe LAE and mild RA dilatation, mild AR, AV sclerosis, moderate MAC, mild to moderate MR, trace TR with PASP 46 mmHg, small pericardial effusion, moderate pleural effusion, January 9, 2025   Acute respiratory failure with hypoxia (HCC)  Patient became hypoxic postvomiting most likely secondary to aspiration yesterday  Required intubation  History of paroxysmal atrial tachycardia  On June 2020 ambulatory cardiac event monitor  On BB  Anemia  At baseline  Essential hypertension  Blood pressure  over the last 24 hours   On metoprolol succinate 25 mg daily for SVT  Hold all other antihypertensives  Pericardial effusion  1/9/2025 echo: Small pericardial effusion with no signs of tamponade  History of stroke, 2020  Thought to be possibly cardioembolic but patient declined Loop recorder in the past and A-Fib was never found so Eliquis was dc'd in the past  Continue home ASA & Lipitor  Stage 3 chronic kidney disease, unspecified whether stage 3a or 3b CKD (HCC)  Lab Results   Component Value Date    CREATININE 2.17 (H) 01/12/2025    CREATININE 1.69 (H) 01/11/2025    CREATININE 1.48 (H) 01/10/2025    EGFR 19 01/12/2025    EGFR 26 01/11/2025    EGFR 30 01/10/2025     Acute kidney  "injury probably secondary to hypoxia during aspiration and intubation    Interval history:  1/11/2025 chest x-ray: \"Persistent pulmonary edema with small pleural effusions and bibasilar atelectasis. Focal consolidation in the medial right midlung could represent pneumonia.\"  Weight 9# over dry weight  Presently not receiving diuretics    Plan:  Recommend resume diuretics when feasible  Follow chest x-ray for monitoring success of diuretics  Monitor electrolytes and renal function carefully    PROBLEM LIST:    Patient Active Problem List    Diagnosis Date Noted    Acute respiratory failure with hypoxia ([facility]) 01/10/2025    Acute on chronic diastolic (congestive) heart failure (HCC) 01/09/2025    History of paroxysmal atrial tachycardia 01/09/2025    Stage 3 chronic kidney disease, unspecified whether stage 3a or 3b CKD ([facility]) 01/08/2025    History of CVA in adulthood 10/23/2024    Lumbar radiculopathy     Anemia 02/22/2022    Pericardial effusion 08/18/2020    History of stroke, 2020 05/25/2020    Idiopathic thrombocythemia ([facility]) 05/24/2020    Disability of walking 05/18/2017    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    Lumbar stenosis with neurogenic claudication 05/15/2017    Vitamin B 12 deficiency 07/28/2016    Elevated fasting glucose 01/22/2016    Essential hypertension 01/22/2016    Osteoporosis 09/23/2011       PREVIOUS WEIGHTS:   Weight (last 2 days)       Date/Time Weight    01/12/25 0547 58.9 (129.85)    01/11/25 0600 61 (134.48)    01/10/25 0539 58.5 (129)            Wt Readings from Last 2 Encounters:   01/12/25 58.9 kg (129 lb 13.6 oz)   01/08/25 56.7 kg (125 lb)         Intake/Output Summary (Last 24 hours) at 1/12/2025 0937  Last data filed at 1/12/2025 0600  Gross per 24 hour   Intake 120 ml   Output 325 ml   Net -205 ml       Labs/Data:        Results from last 7 days   Lab Units 01/12/25  0608 01/11/25  0631 01/10/25  2000 01/10/25  0504   WBC Thousand/uL 12.64* 16.00* "  --  11.65*   HEMOGLOBIN g/dL 7.7* 9.0*  --  8.2*   HEMATOCRIT % 24.1* 28.4*  --  25.7*   MCV fL 93 92  --  92   MCH pg 29.8 29.2  --  29.3   MCHC g/dL 32.0 31.7  --  31.9   PLATELETS Thousands/uL 474* 703* 586* 673*     Results from last 7 days   Lab Units 01/12/25  0608 01/11/25  0631 01/10/25  0504   EGFR ml/min/1.73sq m 19 26 30   SODIUM mmol/L 138 138 139   POTASSIUM mmol/L 3.7 4.4 4.2   CHLORIDE mmol/L 101 101 104   CO2 mmol/L 28 28 27   BUN mg/dL 45* 32* 27*   CREATININE mg/dL 2.17* 1.69* 1.48*     Results from last 7 days   Lab Units 01/12/25  0608 01/11/25  0631 01/10/25  0504 01/09/25  2221 01/09/25  0751   CALCIUM mg/dL 7.6* 8.6 8.5   < > 8.7   AST U/L  --  50* 23  --  24   ALT U/L  --  29 14  --  17   ALK PHOS U/L  --  69 61  --  70   MAGNESIUM mg/dL 2.2 2.3 2.2   < >  --     < > = values in this interval not displayed.       Lab Results   Component Value Date    CHOLESTEROL 130 01/02/2025    TRIG 132 01/02/2025    HDL 44 (L) 01/02/2025    LDLCALC 60 01/02/2025    HGBA1C 5.7 (H) 05/25/2020     Lab Results   Component Value Date    TSH 4.04 (H) 12/05/2022    TSH 4.80 (H) 05/25/2020    FREET4 1.31 05/25/2020       Current Facility-Administered Medications:     acetaminophen (TYLENOL) tablet 650 mg, 650 mg, Oral, Q6H PRN, BETZAIDA Singh, 650 mg at 01/10/25 0902    aspirin chewable tablet 81 mg, 81 mg, Oral, Daily, BETZAIDA Singh, 81 mg at 01/10/25 0903    atorvastatin (LIPITOR) tablet 40 mg, 40 mg, Oral, QPM, BETZAIDA Singh, 40 mg at 01/09/25 1746    cefTRIAXone (ROCEPHIN) 2,000 mg in dextrose 5 % 50 mL IVPB, 2,000 mg, Intravenous, Q24H, BETZAIDA Singh, Last Rate: 100 mL/hr at 01/11/25 2241, 2,000 mg at 01/11/25 2241    chlorhexidine (PERIDEX) 0.12 % oral rinse 15 mL, 15 mL, Mouth/Throat, Q12H JOSE, CHARLENE SinghNP, 15 mL at 01/11/25 2024    fentaNYL injection 50 mcg, 50 mcg, Intravenous, Q1H PRN, BETZAIDA Singh, 50 mcg at 01/12/25 0614    [Held by provider]  furosemide (LASIX) injection 40 mg, 40 mg, Intravenous, BID (diuretic), Matthew Giron, CRNP, 40 mg at 01/09/25 1556    heparin (porcine) subcutaneous injection 5,000 Units, 5,000 Units, Subcutaneous, Q8H JOSE, 5,000 Units at 01/12/25 0546 **AND** [COMPLETED] Platelet count, , , Once, Matthew Giron, CRNP    hydrOXYzine HCL (ATARAX) tablet 10 mg, 10 mg, Oral, HS PRN, Matthew Buitragocsek, CRNP, 10 mg at 01/09/25 2226    ipratropium-albuterol (DUO-NEB) 0.5-2.5 mg/3 mL inhalation solution 3 mL, 3 mL, Nebulization, Q6H PRN, Matthew Buitragocsek, CRNP, 3 mL at 01/12/25 0300    labetalol (NORMODYNE) injection 10 mg, 10 mg, Intravenous, Q6H PRN, Matthew Smallek, CRNP, 10 mg at 01/11/25 1037    metoprolol succinate (TOPROL-XL) 24 hr tablet 25 mg, 25 mg, Oral, Daily, Matthew Smallek, CRNP, 25 mg at 01/10/25 0902    ondansetron (ZOFRAN) injection 4 mg, 4 mg, Intravenous, Q6H PRN, Matthew Buitragocsek, CRNP, 4 mg at 01/10/25 1615    oseltamivir (TAMIFLU) capsule 30 mg, 30 mg, Oral, Q24H, Matthew Buitragocsek, CRNP, 30 mg at 01/11/25 2100    pantoprazole (PROTONIX) injection 40 mg, 40 mg, Intravenous, Q24H JOSE, Matthew Buitragocsek, CRNP, 40 mg at 01/11/25 1300    propofol (DIPRIVAN) 1000 mg in 100 mL infusion (premix), 5-50 mcg/kg/min, Intravenous, Titrated, Matthew Giron, CRNP, Stopped at 01/12/25 0915    sodium chloride 3 % inhalation solution 4 mL, 4 mL, Nebulization, Q6H, Bella Rosas, CRNP, 4 mL at 01/12/25 0832  Allergies   Allergen Reactions    Codeine Nausea Only and GI Intolerance    Erythromycin Nausea Only and GI Intolerance    Amoxicillin Rash    Azithromycin Rash    Erythromycin Base Rash    Metoprolol Rash    Other Rash    Oxycodone Rash    Oxycodone-Acetaminophen Rash       Invasive Devices       Peripheral Intravenous Line  Duration             Peripheral IV 01/10/25 Dorsal (posterior);Right Forearm 1 day    Peripheral IV 01/11/25 Dorsal (posterior);Left Forearm 1 day              Drain  Duration              "NG/OG/Enteral Tube 16 Fr Center mouth 1 day    Urethral Catheter <1 day              Airway  Duration             ETT  Cuffed 7 mm <1 day                    Review of Systems   Unable to perform ROS: Intubated       Vitals: /55   Pulse 91   Temp 99.1 °F (37.3 °C) (Axillary)   Resp 16   Ht 5' 2\" (1.575 m)   Wt 58.9 kg (129 lb 13.6 oz)   SpO2 98%   BMI 23.75 kg/m²     Physical Exam  Constitutional:       Appearance: She is well-developed. She is ill-appearing.      Comments: Intubated and on propofol   HENT:      Head: Normocephalic and atraumatic.   Neck:      Thyroid: No thyromegaly.      Vascular: No carotid bruit or JVD.      Trachea: No tracheal deviation.   Cardiovascular:      Rate and Rhythm: Normal rate and regular rhythm.      Pulses: Normal pulses.      Heart sounds: Murmur (Holosystolic murmur at apex) heard.      Systolic murmur is present with a grade of 2/6.      No friction rub. No gallop.   Pulmonary:      Effort: Pulmonary effort is normal. No respiratory distress.      Breath sounds: Normal breath sounds. No wheezing, rhonchi or rales.   Chest:      Chest wall: No tenderness.   Abdominal:      General: Bowel sounds are normal. There is no distension.      Palpations: Abdomen is soft.      Tenderness: There is no abdominal tenderness.   Musculoskeletal:         General: Normal range of motion.      Cervical back: Normal range of motion and neck supple.      Right lower leg: No edema (Trace thigh edema).      Left lower leg: No edema (Trace thigh edema).   Skin:     General: Skin is warm and dry.         EKG: Sinus rhythm with PACs at a rate of 96 bpm    Portions of the record may have been created with voice recognition software. Occasional wrong word or \"sound a like\" substitutions may have occurred due to the inherent limitations of voice recognition software. Read the chart carefully and recognize, using context, where substitutions have " occurred.    ======================================================        Imaging Results Review: I reviewed radiology reports from this admission including: chest xray and Echocardiogram.  Other Study Results Review: EKG was reviewed.

## 2025-01-12 NOTE — RESPIRATORY THERAPY NOTE
01/12/25 0300   Respiratory Assessment   Assessment Type Assess only   General Appearance Sedated   Respiratory Pattern Assisted   Chest Assessment Chest expansion symmetrical   Vent Information   Vent ID 1   Vent type Rodriguez C3   Rodriguez Vent Mode (S)CMV   $ Vent Daily Charge-Subsequent Yes   $ Pulse Oximetry Spot Check Charge Completed   SpO2 99 %   (S)CMV Settings   Resp Rate (BPM) 16 BPM   VT (mL) 350 mL   FIO2 (%) 50 %   PEEP (cmH2O) 6 cmH2O   I:E Ratio 1:2.8   Insp Time (%) 1 %   Flow Trigger (LPM) 2   Humidification Heater   Heater Temperature (Set) 98.6 °F (37 °C)   Pause Time (%) 0 %   (S)CMV Actuals   Resp Rate (BPM) 16 BPM   VT (mL) 378   MV 5.3   MAP (cmH2O) 9.5 cmH2O   Peak Pressure (cmH2O) 23 cmH2O   I:E Ratio (Obs) 1:2.8   Insp Resistance 13   Heater Temperature (Obs) 98.6 °F (37 °C)   Static Compliance (mL/cmH20) 25.9 mL/cmH2O   (S)CMV Alarms   High Peak Pressure (cmH2O) 50   Low Pressure (cmH2O) 5 cm H2O   High Resp Rate (BPM) 50 BPM   Low Resp Rate (BPM) 5 BPM   High MV (L/min) 20 L/min   Low MV (L/min) 3 L/min   High VT (mL) 1000 mL   Low VT (mL) 250 mL   Maintenance   Alarm (pink) cable attached No   Resuscitation bag with peep valve at bedside Yes   Water bag changed No   Circuit changed No   Daily Screen   Patient safety screen outcome: Failed   Not Ready for Weaning due to: Underline problem not resolved   IHI Ventilator Associated Pneumonia Bundle   Daily Assessment of Readiness to Extubate Not applicable (Comment)   Head of Bed Elevated HOB 30   ETT  Cuffed 7 mm   Placement Date/Time: 01/11/25 (c) 3069   Type: Cuffed  Tube Size: 7 mm  Insertion attempts: 2  Placement Verification: End tidal CO2  Secured at (cm): 22   Secured at (cm) 22   Measured from Lips   Secured Location Left   Repositioned Left to Center   Secured by Commercial tube lizama   Site Condition Dry   Cuff Pressure (color) Green   HI-LO Suction  Capped   HI-LO Secretions Scant   HI-LO Intervention Patent

## 2025-01-12 NOTE — ASSESSMENT & PLAN NOTE
Blood pressure  over the last 24 hours   On metoprolol succinate 25 mg daily for SVT  Hold all other antihypertensives

## 2025-01-12 NOTE — ASSESSMENT & PLAN NOTE
Patient became hypoxic post vomiting likely secondary to aspiration  Significant tachypnea  Hypoxia  Patient is a level 1 full code confirmed with patient's daughter    Plan  Initially improved on BIPAP but again deteriorated with somnolence and evidence of hypercarbia  Was intubated 1/11  CXR reveals right sided infiltrate  We will continue vent support for now

## 2025-01-12 NOTE — ASSESSMENT & PLAN NOTE
Lab Results   Component Value Date    EGFR 26 01/11/2025    EGFR 30 01/10/2025    EGFR 35 01/09/2025    CREATININE 1.69 (H) 01/11/2025    CREATININE 1.48 (H) 01/10/2025    CREATININE 1.31 (H) 01/09/2025     Avoid nephrotoxic medications  Avoid hypotension  Will monitor her renal indices and I&Os closely

## 2025-01-13 NOTE — ASSESSMENT & PLAN NOTE
Patient became hypoxic postvomiting most likely secondary to aspiration on 1/11 requiring intubation  Extubated on 1/13  Also tested positive for influenza A

## 2025-01-13 NOTE — RESPIRATORY THERAPY NOTE
01/13/25 0945   Respiratory Assessment   Resp Comments Patient received on documetned S(CMV) settings.  Patient placed on wean per attending.   Vent Information   Vent ID 1   Vent type Rodriguez C3   Rodriguez Vent Mode SPONT   $ Pulse Oximetry Spot Check Charge Completed   SpO2 95 %   SPONT Settings   FIO2 (%) 50 %   PEEP (cmH2O) 6 cmH2O   Pressure Support (cmH2O) 6 cmH20   Flow Trigger (LPM) 2 LPM   P-ramp (ms) 50 ms   ETS  (%) 25 %   Humidification Heater   Heater Temp 98.6 °F (37 °C)   SPONT Actuals   Resp Rate (BPM) 26 BPM   VT (mL) 416 mL   MV (Obs) 8.7   MAP (cmH2O) 10 cmH2O   Peak Pressure (cmH2O) 12 cmH2O   I:E Ratio (Obs) 1:1.8   RSBI (f/vt) 70 f/Vt   Heater Temperature (Obs) 98.6 °F (37 °C)   SPONT Alarms   High Peak Pressure (cmH20) 50 cmH2O   High Resp Rate (BPM) 50 BPM   High MV (L/min) 20 L/min   Low MV (L/min) 3 L/min   High Spont VTE (mL) 1000 mL   Low Spont VTE (mL) 250 mL   Apnea Time (S) 20 S   SPONT Apnea Settings   Resp Rate (BPM) 16 BPM   FIO2 (%) 100 %   %TI (%) 0.75 %   Apnea Time (S) 350 S   Maintenance   Alarm (pink) cable attached No   Resuscitation bag with peep valve at bedside Yes   Water bag changed No   Circuit changed No   Daily Screen   Patient safety screen outcome: Passed   Spont breathing trial % for 30 min Yes   RSBI 70     RT Ventilator Management Note  Nilsa Morales 89 y.o. female MRN: [id number]  Unit/Bed#: ICU 06 Encounter: [id number]      Daily Screen         1/13/2025  0730 1/13/2025  0945          Patient safety screen outcome:: Failed Passed      Not Ready for Weaning due to:: Underline problem not resolved --      Spont breathing trial % for 30 min: -- Yes      RSBI: -- 70                Physical Exam:   Assessment Type: Assess only  General Appearance: Sedated  Respiratory Pattern: Assisted  Chest Assessment: Chest expansion symmetrical      Resp Comments: (P) Patient received on documetned S(CMV) settings.  Patient placed on wean per attending.

## 2025-01-13 NOTE — PHYSICAL THERAPY NOTE
PHYSICAL THERAPY NOTE          Patient Name: Nilsa Morales  Today's Date: 1/13/2025 01/13/25 1513   PT Last Visit   PT Visit Date 01/13/25   Note Type   Note type Cancelled Session   Cancel Reasons Medical status   Additional Comments Pt to transition to comfort measures.  Will d/c PT orders. Please advise if needs change and reconsult if condition warrants.     Aleida Khalil, PT

## 2025-01-13 NOTE — PLAN OF CARE
Problem: Potential for Falls  Goal: Patient will remain free of falls  Description: INTERVENTIONS:  - Educate patient/family on patient safety including physical limitations  - Instruct patient to call for assistance with activity   - Consult OT/PT to assist with strengthening/mobility   - Keep Call bell within reach  - Keep bed low and locked with side rails adjusted as appropriate  - Keep care items and personal belongings within reach  - Initiate and maintain comfort rounds  - Make Fall Risk Sign visible to staff  - Offer Toileting every 2 Hours, in advance of need  - Initiate/Maintain bed alarm  - Obtain necessary fall risk management equipment  - Apply yellow socks and bracelet for high fall risk patients  - Consider moving patient to room near nurses station  Outcome: Progressing     Problem: PAIN - ADULT  Goal: Verbalizes/displays adequate comfort level or baseline comfort level  Description: Interventions:  - Encourage patient to monitor pain and request assistance  - Assess pain using appropriate pain scale  - Administer analgesics based on type and severity of pain and evaluate response  - Implement non-pharmacological measures as appropriate and evaluate response  - Consider cultural and social influences on pain and pain management  - Notify physician/advanced practitioner if interventions unsuccessful or patient reports new pain  Outcome: Progressing     Problem: DISCHARGE PLANNING  Goal: Discharge to home or other facility with appropriate resources  Description: INTERVENTIONS:  - Identify barriers to discharge w/patient and caregiver  - Arrange for needed discharge resources and transportation as appropriate  - Identify discharge learning needs (meds, wound care, etc.)  - Arrange for interpretive services to assist at discharge as needed  - Refer to Case Management Department for coordinating discharge planning if the patient needs post-hospital services based on physician/advanced practitioner  order or complex needs related to functional status, cognitive ability, or social support system  Outcome: Progressing     Problem: Knowledge Deficit  Goal: Patient/family/caregiver demonstrates understanding of disease process, treatment plan, medications, and discharge instructions  Description: Complete learning assessment and assess knowledge base.  Interventions:  - Provide teaching at level of understanding  - Provide teaching via preferred learning methods  Outcome: Progressing     Problem: RESPIRATORY - ADULT  Goal: Achieves optimal ventilation and oxygenation  Description: INTERVENTIONS:  - Assess for changes in respiratory status  - Assess for changes in mentation and behavior  - Position to facilitate oxygenation and minimize respiratory effort  - Oxygen administered by appropriate delivery if ordered  - Initiate smoking cessation education as indicated  - Encourage broncho-pulmonary hygiene including cough, deep breathe, Incentive Spirometry  - Assess the need for suctioning and aspirate as needed  - Assess and instruct to report SOB or any respiratory difficulty  - Respiratory Therapy support as indicated  Outcome: Progressing     Problem: SKIN/TISSUE INTEGRITY - ADULT  Goal: Skin Integrity remains intact(Skin Breakdown Prevention)  Description: Assess:  -Perform Freddy assessment every shift  -Clean and moisturize skin every 2 hours and PRN  -Inspect skin when repositioning, toileting, and assisting with ADLS  -Assess under medical devices  -Assess extremities for adequate circulation and sensation     Bed Management:  -Have minimal linens on bed & keep smooth, unwrinkled  -Change linens as needed when moist or perspiring  -Avoid sitting or lying in one position for more than 2 hours while in bed  -Keep HOB at 30 degrees     Toileting:  -Offer bedside commode  -Assess for incontinence every 2 hours and PRN  -Use incontinent care products after each incontinent episode    Activity:  -Mobilize patient 3  times a day  -Encourage activity and walks on unit  -Encourage or provide ROM exercises   -Turn and reposition patient every 2 Hours  -Use appropriate equipment to lift or move patient in bed  -Instruct/ Assist with weight shifting every 2 hours when out of bed in chair  -Consider limitation of chair time 2 hour intervals    Skin Care:  -Avoid use of baby powder, tape, friction and shearing, hot water or constrictive clothing  -Relieve pressure over bony prominences   -Do not massage red bony areas    Next Steps:  -Teach patient strategies to minimize risks    -Consider consults to  interdisciplinary teams  Outcome: Progressing     Problem: MUSCULOSKELETAL - ADULT  Goal: Maintain or return mobility to safest level of function  Description: INTERVENTIONS:  - Assess patient's ability to carry out ADLs; assess patient's baseline for ADL function and identify physical deficits which impact ability to perform ADLs (bathing, care of mouth/teeth, toileting, grooming, dressing, etc.)  - Assess/evaluate cause of self-care deficits   - Assess range of motion  - Assess patient's mobility  - Assess patient's need for assistive devices and provide as appropriate  - Encourage maximum independence but intervene and supervise when necessary  - Involve family in performance of ADLs  - Assess for home care needs following discharge   - Consider OT consult to assist with ADL evaluation and planning for discharge  - Provide patient education as appropriate  Outcome: Progressing  Goal: Maintain proper alignment of affected body part  Description: INTERVENTIONS:  - Support, maintain and protect limb and body alignment  - Provide patient/ family with appropriate education  Outcome: Progressing     Problem: Prexisting or High Potential for Compromised Skin Integrity  Goal: Skin integrity is maintained or improved  Description: INTERVENTIONS:  - Identify patients at risk for skin breakdown  - Assess and monitor skin integrity  - Assess and  monitor nutrition and hydration status  - Monitor labs   - Assess for incontinence   - Turn and reposition patient  - Assist with mobility/ambulation  - Relieve pressure over bony prominences  - Avoid friction and shearing  - Provide appropriate hygiene as needed including keeping skin clean and dry  - Evaluate need for skin moisturizer/barrier cream  - Collaborate with interdisciplinary team   - Patient/family teaching  - Consider wound care consult   Outcome: Progressing     Problem: Nutrition/Hydration-ADULT  Goal: Nutrient/Hydration intake appropriate for improving, restoring or maintaining nutritional needs  Description: Monitor and assess patient's nutrition/hydration status for malnutrition. Collaborate with interdisciplinary team and initiate plan and interventions as ordered.  Monitor patient's weight and dietary intake as ordered or per policy. Utilize nutrition screening tool and intervene as necessary. Determine patient's food preferences and provide high-protein, high-caloric foods as appropriate.     INTERVENTIONS:  - Monitor oral intake, urinary output, labs, and treatment plans  - Assess nutrition and hydration status and recommend course of action  - Evaluate amount of meals eaten  - Assist patient with eating if necessary   - Allow adequate time for meals  - Recommend/ encourage appropriate diets, oral nutritional supplements, and vitamin/mineral supplements  - Order, calculate, and assess calorie counts as needed  - Recommend, monitor, and adjust tube feedings and TPN/PPN based on assessed needs  - Assess need for intravenous fluids  - Provide specific nutrition/hydration education as appropriate  - Include patient/family/caregiver in decisions related to nutrition  Outcome: Progressing     Problem: SAFETY,RESTRAINT: NV/NON-SELF DESTRUCTIVE BEHAVIOR  Goal: Remains free of harm/injury (restraint for non violent/non self-detsructive behavior)  Description: INTERVENTIONS:  - Instruct patient/family  regarding restraint use   - Assess and monitor physiologic and psychological status   - Provide interventions and comfort measures to meet assessed patient needs   - Identify and implement measures to help patient regain control  - Assess readiness for release of restraint   Outcome: Progressing  Goal: Returns to optimal restraint-free functioning  Description: INTERVENTIONS:  - Assess the patient's behavior and symptoms that indicate continued need for restraint  - Identify and implement measures to help patient regain control  - Assess readiness for release of restraint   Outcome: Progressing

## 2025-01-13 NOTE — CASE MANAGEMENT
Case Management Discharge Planning Note    Patient name Nilsa Morales  Location ICU 06/ICU 06 MRN [id number]  : 2/3/1934 Date 2025       Current Admission Date: 2025  Current Admission Diagnosis:Acute respiratory failure with hypoxia (HCC)   Patient Active Problem List    Diagnosis Date Noted Date Diagnosed    Influenza A 2025     Pneumonia 2025     Acute respiratory failure with hypoxia (HCC) 01/10/2025     Acute on chronic diastolic (congestive) heart failure (HCC) 2025     History of paroxysmal atrial tachycardia 2025     Stage 3 chronic kidney disease, unspecified whether stage 3a or 3b CKD (HCC) 2025     History of CVA in adulthood 10/23/2024     Lumbar radiculopathy      Anemia 2022     Pericardial effusion 2020     History of stroke, 2020     Idiopathic thrombocythemia (HCC) 2020     Disability of walking 2017     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 REP  Secondary Insurance:     DISCHARGE DETAILS:     Discharge Destination Plan:: Assisted Living         Additional Comments: Patient is intubated and is not medically cleared for discharge.  CM department will continue to follow the patient through hospital discharge.

## 2025-01-13 NOTE — ASSESSMENT & PLAN NOTE
Monitor hemoglobin  Transfuse for s/s of active bleeding  Transfuse for Hgb <7  Iron Studies: Would recommend starting Iron supplementation   Latest Reference Range & Units 01/11/25 15:48    Iron 50 - 212 ug/dL <10 (L)   FERRITIN 11 - 307 ng/mL 218   Iron Saturation  See Comment   TRANSFERRIN 203 - 362 mg/dL 184 (L)   TIBC 250 - 450 ug/dL 257.6   UIBC  See Comment   (L): Data is abnormally low

## 2025-01-13 NOTE — ASSESSMENT & PLAN NOTE
Initial Rapid test negative,  Positive for Influenza A on RP2 panel  Oseltamivir 30mg daily D3/10  Isolation precautions  Monitor fever curve

## 2025-01-13 NOTE — PROGRESS NOTES
Pastoral Care Progress Note           provided continued support for family, encouraged daughter to think about choice of  home.  She is going home to be present for her father whose health is also not good and discuss plans with him.  She has the appropriate number to use to contact the unit with  home information.  Pastoral care needs seem complete at this time.       25 1800   Clinical Encounter Type   Visited With Family   Routine Visit Follow-up   Crisis Visit Patient actively dying

## 2025-01-13 NOTE — PROGRESS NOTES
August 7, 2019      Chayito Jane, NP  [address]  [facility] [address]           [facility]  [address] [address] [facility] [address]  Phone: [phone]  Fax: [phone]          Patient: Carmela Beck   MR Number: [id number]   YOB: 2000   Date of Visit: 8/7/2019       Dear Chayito Jane:    Thank you for referring Carmela Beck to me for evaluation. Attached you will find relevant portions of my assessment and plan of care.    If you have questions, please do not hesitate to call me. I look forward to following Carmela Beck along with you.    Sincerely,    Fadia Paz PA-C    Enclosure  CC:  No Recipients    If you would like to receive this communication electronically, please contact [email or web address] or [phone] to request more information on [employer] Link access.    For providers and/or their staff who would like to refer a patient to [facility], please contact us through our one-stop-shop provider referral line, [facility], at [phone].    If you feel you have received this communication in error or would no longer like to receive these types of communications, please e-mail [email or web address]          Progress Note - Critical Care/ICU   Name: Nilsa Morales 89 y.o. female I MRN: [id number]  Unit/Bed#: ICU 06 I Date of Admission: 1/9/2025   Date of Service: 1/13/2025 I Hospital Day: 4      Assessment & Plan  Acute respiratory failure with hypoxia (HCC)  1/10 Patient became hypoxic post vomiting likely secondary to aspiration  Significant tachypnea  Patient is a level 1 full code confirmed with patient's daughter  Initially improved on BIPAP 1/10 but again deteriorated with somnolence and evidence of hypercarbia on 1/11 and required intubation    Plan  CXR reveals right sided infiltrate suspect secondary bacterial infection post influenza A vs possible aspiration  We will continue vent support for now  Daily SAT and SBT  Sedation for goal RASS -1, prn fentanyl or analgesia  Acute on chronic diastolic (congestive) heart failure (HCC)  Wt Readings from Last 3 Encounters:   01/12/25 58.9 kg (129 lb 13.6 oz)   01/08/25 56.7 kg (125 lb)   10/23/24 56.3 kg (124 lb 3.2 oz)     1/9/2025: Left Ventricle: EF 72% by visual estimation. Systolic function is hyperdynamic. Wall motion is normal. Diastolic function is moderately abnormal, consistent with grade II (pseudonormal) relaxation.  Left atrial filling pressure is elevated. There is a prominent myocardial speckle pattern.Right Ventricle: Right ventricular cavity size is normal. Systolic function is normal. Left Atrium: The atrium is severely dilated. Right Atrium: The atrium is mildly dilated. Mild AR, mild to moderate MR, small pericardial effusion, no echocardiographic evidence of tamponade    Decompensated and required intubation 1/11  Likely related to right sided infiltrate  We will monitor her volume status closely            History of paroxysmal atrial tachycardia  Continuous cardiopulmonary monitoring  Continue beta-blockade      Anemia  Monitor hemoglobin  Transfuse for s/s of active bleeding  Transfuse for Hgb <7  Iron Studies: Would recommend starting Iron  supplementation   Latest Reference Range & Units 01/11/25 15:48    Iron 50 - 212 ug/dL <10 (L)   FERRITIN 11 - 307 ng/mL 218   Iron Saturation  See Comment   TRANSFERRIN 203 - 362 mg/dL 184 (L)   TIBC 250 - 450 ug/dL 257.6   UIBC  See Comment   (L): Data is abnormally low  Essential hypertension  Continuous cardiopulmonary monitoring  Maintain MAP of 65 mmHg  Continue Metoprolol 25mg XL daily     Lumbar stenosis with neurogenic claudication  Monitor pain  Treat as needed    Pericardial effusion  Cardiology following  No evidence of tamponade    Monitor hemodynamics closely  History of stroke, 2020  Continue neuro checks   Continue ASA and atorvastatin daily     Stage 3 chronic kidney disease, unspecified whether stage 3a or 3b CKD (HCC)  Lab Results   Component Value Date    EGFR 19 01/12/2025    EGFR 26 01/11/2025    EGFR 30 01/10/2025    CREATININE 2.17 (H) 01/12/2025    CREATININE 1.69 (H) 01/11/2025    CREATININE 1.48 (H) 01/10/2025     Pt now with NOLBERTO on CKD  Avoid nephrotoxic medications  Diuretics on hold at this time  Avoid hypotension  Will monitor her renal indices and I&Os closely   Influenza A  Initial Rapid test negative,  Positive for Influenza A on RP2 panel  Oseltamivir 30mg daily D3/10  Isolation precautions  Monitor fever curve    Pneumonia  Etiology secondary bacterial infection vs Aspiration  Ceftriaxone 2 grams IV Q24 hr D #5  MRSA Nares pending  Elevated PCT on 1/11/2025 1/11/2025 Sputum cx pending  Mucomyst initiated 1/12              Disposition: Critical care    ICU Core Measures     Vented Patient  VAP Bundle  VAP bundle ordered     A: Assess, Prevent, and Manage Pain Has pain been assessed? Yes  Need for changes to pain regimen? No   B: Both Spontaneous Awakening Trials (SATs) and Spontaneous Breathing Trials (SBTs) Plan to perform spontaneous awakening trial today? Yes   Plan to perform spontaneous breathing trial today? Yes   Obvious barriers to extubation? No   C: Choice of Sedation  RASS Goal: -1 Drowsy  Need for changes to sedation or analgesia regimen? No   D: Delirium CAM-ICU: Unable to perform secondary to Acute cognitive dysfunction   E: Early Mobility  Plan for early mobility? Yes   F: Family Engagement Plan for family engagement today? Yes       Antibiotic Review: Awaiting culture results.     Review of Invasive Devices:    Arenas Plan: Continue for accurate I/O monitoring for 48 hours        Prophylaxis:  VTE VTE covered by:  heparin (porcine), Subcutaneous, 5,000 Units at 01/12/25 2144       Stress Ulcer  covered bypantoprazole (PROTONIX) injection 40 mg [[id number]]         24 Hour Events :     Febrile 102.6, blood cultures sent  500 cc bolus for marginal UO    Subjective   Review of Systems: Review of Systems not obtainable due to Altered mental status    Objective :                   Vitals I/O      Most Recent Min/Max in 24hrs   Temp 100.1 °F (37.8 °C) Temp  Min: 99 °F (37.2 °C)  Max: 102.6 °F (39.2 °C)   Pulse 73 Pulse  Min: 73  Max: 105   Resp 16 Resp  Min: 16  Max: 40   BP 97/55 BP  Min: 78/42  Max: 149/66   O2 Sat 98 % SpO2  Min: 91 %  Max: 100 %      Intake/Output Summary (Last 24 hours) at 1/13/2025 0345  Last data filed at 1/13/2025 0200  Gross per 24 hour   Intake 780.63 ml   Output 470 ml   Net 310.63 ml       No diet orders on file    Invasive Monitoring           Physical Exam   Physical Exam  Eyes:      Pupils: Pupils are equal, round, and reactive to light.   Skin:     General: Skin is warm.   HENT:      Head: Normocephalic and atraumatic.      Mouth/Throat:      Mouth: Mucous membranes are dry.      Comments: ETT in place  Cardiovascular:      Rate and Rhythm: Normal rate and regular rhythm.      Pulses: Normal pulses.      Heart sounds: No murmur heard.     No friction rub. No gallop.   Musculoskeletal:      Right lower leg: No edema.      Left lower leg: No edema.   Abdominal: General: Bowel sounds are normal.      Palpations: Abdomen is soft.      Tenderness: There  is no abdominal tenderness.   Constitutional:       Interventions: She is sedated and intubated.   Pulmonary:      Effort: She is intubated.      Breath sounds: Rhonchi present. No wheezing.      Comments: Patient on full vent support  Neurological:      GCS: GCS eye subscore is 3. GCS verbal subscore is 1. GCS motor subscore is 6.      Comments: Sedated on Propofol  Shakes head yes and no to questions relate to pain and temperature   Genitourinary/Anorectal:  Arenas present.        Diagnostic Studies        Lab Results: I have reviewed the following results:     Medications:  Scheduled PRN   acetylcysteine, 3 mL, Q6H While awake  aspirin, 81 mg, Daily  atorvastatin, 40 mg, QPM  cefTRIAXone, 2,000 mg, Q24H  chlorhexidine, 15 mL, Q12H JOSE  [Held by provider] furosemide, 40 mg, BID (diuretic)  heparin (porcine), 5,000 Units, Q8H JOSE  metoprolol succinate, 25 mg, Daily  oseltamivir, 30 mg, Q24H  pantoprazole, 40 mg, Q24H JOSE      acetaminophen, 650 mg, Q6H PRN  fentaNYL, 50 mcg, Q1H PRN  hydrOXYzine HCL, 10 mg, HS PRN  ipratropium-albuterol, 3 mL, Q6H PRN  labetalol, 10 mg, Q6H PRN  ondansetron, 4 mg, Q6H PRN       Continuous    dextrose 5 % and sodium chloride 0.9 %, 50 mL/hr, Last Rate: 50 mL/hr (01/12/25 1418)  propofol, 5-50 mcg/kg/min, Last Rate: 20 mcg/kg/min (01/13/25 0207)         Labs:   CBC    Recent Labs     01/11/25  0631 01/12/25  0608 01/12/25  1945   WBC 16.00* 12.64*  --    HGB 9.0* 7.7* 7.7*   HCT 28.4* 24.1*  --    * 474*  --      BMP    Recent Labs     01/11/25  0631 01/12/25  0608   SODIUM 138 138   K 4.4 3.7    101   CO2 28 28   AGAP 9 9   BUN 32* 45*   CREATININE 1.69* 2.17*   CALCIUM 8.6 7.6*       Coags    No recent results     Additional Electrolytes  Recent Labs     01/11/25  0631 01/12/25  0608   MG 2.3 2.2   PHOS 5.7*  --           Blood Gas    Recent Labs     01/11/25  2130   PHART 7.408   [id number] 40.1   PO2ART 292.5*   KOQ6OAT 24.7   BEART 0.1   SOURCE Radial, Right     Recent  Labs     01/11/25  2130   SOURCE Radial, Right    LFTs  Recent Labs     01/11/25  0631   ALT 29   AST 50*   ALKPHOS 69   ALB 4.0   TBILI 0.29       Infectious  Recent Labs     01/11/25  0631   PROCALCITONI 26.73*     Glucose  Recent Labs     01/11/25  0631 01/12/25  0608   GLUC 132 116

## 2025-01-13 NOTE — PROGRESS NOTES
Progress Note - Cardiology   Name: Nilsa Morales 89 y.o. female I MRN: [id number]  Unit/Bed#: ICU 06 I Date of Admission: 1/9/2025   Date of Service: 1/13/2025 I Hospital Day: 4     Assessment & Plan  Acute on chronic diastolic (congestive) heart failure (HCC)  -TTE 1/9/2025 showed EF 72%, grade 2 DD, severe LAE, mild WILBERT, mild AI, estimated PA pressure 46 mmHg, small pericardial effusion, moderate left pleural effusion  Neurohormonal Blockade:  --Beta Blocker: Toprol 25 mg QD  --ARNi / ACEi / ARB: None  --Aldosterone Antagonist: None  --SGLT2 Inhibitor: None  --Home Diuretic: None  --Inpatient Diuretic: None    - Currently holding diuretics, recommend resuming when feasible  Acute respiratory failure with hypoxia (HCC)  Patient became hypoxic postvomiting most likely secondary to aspiration on 1/11 requiring intubation  Extubated on 1/13  Also tested positive for influenza A  History of paroxysmal atrial tachycardia  On June 2020 ambulatory cardiac event monitor  On BB  Anemia  At baseline  Essential hypertension  Blood pressure remains stable  On metoprolol succinate 25 mg daily for SVT  Hold all other antihypertensives  Pericardial effusion  1/9/2025 echo: Small pericardial effusion with no signs of tamponade  History of stroke, 2020  Thought to be possibly cardioembolic but patient declined Loop recorder in the past and A-Fib was never found so Eliquis was dc'd in the past  Continue home ASA & Lipitor  Stage 3 chronic kidney disease, unspecified whether stage 3a or 3b CKD ([facility])  Lab Results   Component Value Date    CREATININE 2.83 (H) 01/13/2025    CREATININE 2.17 (H) 01/12/2025    CREATININE 1.69 (H) 01/11/2025    EGFR 14 01/13/2025    EGFR 19 01/12/2025    EGFR 26 01/11/2025     Acute kidney injury probably secondary to hypoxia during aspiration and intubation  Lumbar stenosis with neurogenic claudication    Influenza A  - Tested positive for influenza A on 1/9/2025  - Continue with supportive  "care  Pneumonia      Summary:  -Patient extubated this morning to 15 L mid flow nasal cannula, patient now DNR/DNI  -Consider giving furosemide 40 mg IV x 1 depending on chest x-ray findings  - Further management per critical care  - Continue with goals of care conversation    Subjective:   Patient was extubated this morning.  She reports significant shortness of breath and discomfort from the high flow nasal cannula.  Unable to obtain rest of ROS secondary to acuity of her condition.     Objective:     Vitals: Blood pressure 147/67, pulse (!) 114, temperature 99.3 °F (37.4 °C), temperature source Axillary, resp. rate (!) 25, height 5' 2\" (1.575 m), weight 59 kg (130 lb 1.1 oz), SpO2 97%., Body mass index is 23.79 kg/m².,   Orthostatic Blood Pressures      Flowsheet Row Most Recent Value   Blood Pressure 147/67 filed at 01/13/2025 1200   Patient Position - Orthostatic VS Lying filed at 01/13/2025 0800              Intake/Output Summary (Last 24 hours) at 1/13/2025 1230  Last data filed at 1/13/2025 0829  Gross per 24 hour   Intake 969.96 ml   Output 340 ml   Net 629.96 ml           Physical Exam:    GEN: Nilsa Morales appears uncomfortable, in respiratory distress, following commands  HEENT: Mucous membranes moist, no scleral icterus, no conjunctival pallor  NECK: Trace JVD  HEART: Tachycardic, distant heart sounds obscured by breath sounds  LUNGS: Diffuse bilateral rhonchi, tachypneic, using accessory muscles to breathe  ABDOMEN: normal bowel sounds, soft, no tenderness, no distention  EXTREMITIES: peripheral pulses normal; no lower extremity edema   NEURO: no focal findings   SKIN: No lesions or rashes on exposed skin        Current Facility-Administered Medications:     acetaminophen (TYLENOL) tablet 650 mg, 650 mg, Oral, Q6H PRN, BETZAIDA Jain, 650 mg at 01/13/25 0634    acetylcysteine (MUCOMYST) 200 mg/mL inhalation solution 600 mg, 3 mL, Nebulization, Q6H While awake, BETZAIDA Singh, 600 mg at " 01/13/25 0730    aspirin chewable tablet 81 mg, 81 mg, Oral, Daily, CHARLENE SinghNP, 81 mg at 01/13/25 0821    atorvastatin (LIPITOR) tablet 40 mg, 40 mg, Oral, QPM, Matthew Smallek, CRNP, 40 mg at 01/12/25 1703    cefTRIAXone (ROCEPHIN) 2,000 mg in dextrose 5 % 50 mL IVPB, 2,000 mg, Intravenous, Q24H, Matthew Smallek CRNP, Last Rate: 100 mL/hr at 01/12/25 2144, 2,000 mg at 01/12/25 2144    chlorhexidine (PERIDEX) 0.12 % oral rinse 15 mL, 15 mL, Mouth/Throat, Q12H JOSE, Matthew Giron CRNP, 15 mL at 01/13/25 0821    dexmedeTOMIDine (Precedex) 400 mcg in sodium chloride 0.9% 100 mL, 0.1-0.7 mcg/kg/hr, Intravenous, Titrated, BETZAIDA Forbes, Last Rate: 1.5 mL/hr at 01/13/25 1155, 0.1 mcg/kg/hr at 01/13/25 1155    dextrose 5 % and sodium chloride 0.9 % infusion, 50 mL/hr, Intravenous, Continuous, BETZAIDA Singh, Last Rate: 50 mL/hr at 01/13/25 0446, 50 mL/hr at 01/13/25 0446    [Held by provider] furosemide (LASIX) injection 40 mg, 40 mg, Intravenous, BID (diuretic), Matthew Giron CRNP, 40 mg at 01/09/25 1556    heparin (porcine) subcutaneous injection 5,000 Units, 5,000 Units, Subcutaneous, Q8H JOSE, 5,000 Units at 01/13/25 0620 **AND** [COMPLETED] Platelet count, , , Once, BETZAIDA Singh    ipratropium-albuterol (DUO-NEB) 0.5-2.5 mg/3 mL inhalation solution 3 mL, 3 mL, Nebulization, Q6H PRN, Matthew Giron CRNP, 3 mL at 01/13/25 0730    [Held by provider] metoprolol succinate (TOPROL-XL) 24 hr tablet 25 mg, 25 mg, Oral, Daily, BETZAIDA Singh, 25 mg at 01/10/25 0902    morphine injection 2 mg, 2 mg, Intravenous, Once, BETZAIDA Forbes    ondansetron (ZOFRAN) injection 4 mg, 4 mg, Intravenous, Q6H PRN, BETZAIDA Singh, 4 mg at 01/10/25 1615    [START ON 1/14/2025] oseltamivir (TAMIFLU) capsule 30 mg, 30 mg, Oral, Q48H, BETZAIDA Forbes    pantoprazole (PROTONIX) injection 40 mg, 40 mg, Intravenous, Q24H JOSE, BETZAIDA Singh, 40 mg at 01/13/25  "0821    Labs & Results:    Lab Results   Component Value Date    TROPONINI 0.86 (HH) 05/25/2020    TROPONINI 0.89 (HH) 05/25/2020    TROPONINI 0.88 (HH) 05/25/2020       Lab Results   Component Value Date    CALCIUM 7.5 (L) 01/13/2025    K 4.1 01/13/2025    CO2 25 01/13/2025     01/13/2025    BUN 54 (H) 01/13/2025    CREATININE 2.83 (H) 01/13/2025       Lab Results   Component Value Date    WBC 8.63 01/13/2025    HGB 7.4 (L) 01/13/2025    HCT 23.4 (L) 01/13/2025    MCV 92 01/13/2025     (H) 01/13/2025           No results found for: \"CHOL\"  Lab Results   Component Value Date    HDL 44 (L) 01/02/2025    HDL 39 (L) 10/12/2023     Lab Results   Component Value Date    LDLCALC 60 01/02/2025    LDLCALC 50 10/12/2023     Lab Results   Component Value Date    TRIG 132 01/02/2025    TRIG 142 10/12/2023       Lab Results   Component Value Date    ALT 28 01/13/2025    AST 33 01/13/2025    ALKPHOS 66 01/13/2025         EKG personally reviewed by )Corey Us MD. No acute changes          "

## 2025-01-13 NOTE — ASSESSMENT & PLAN NOTE
Lab Results   Component Value Date    CREATININE 2.83 (H) 01/13/2025    CREATININE 2.17 (H) 01/12/2025    CREATININE 1.69 (H) 01/11/2025    EGFR 14 01/13/2025    EGFR 19 01/12/2025    EGFR 26 01/11/2025     Acute kidney injury probably secondary to hypoxia during aspiration and intubation

## 2025-01-13 NOTE — ASSESSMENT & PLAN NOTE
1/10 Patient became hypoxic post vomiting likely secondary to aspiration  Significant tachypnea  Patient is a level 1 full code confirmed with patient's daughter  Initially improved on BIPAP 1/10 but again deteriorated with somnolence and evidence of hypercarbia on 1/11 and required intubation    Plan  CXR reveals right sided infiltrate suspect secondary bacterial infection post influenza A vs possible aspiration  We will continue vent support for now  Daily SAT and SBT  Sedation for goal RASS -1, prn fentanyl or analgesia

## 2025-01-13 NOTE — ASSESSMENT & PLAN NOTE
Continuous cardiopulmonary monitoring  Maintain MAP of 65 mmHg  Continue Metoprolol 25mg XL daily

## 2025-01-13 NOTE — ASSESSMENT & PLAN NOTE
Lab Results   Component Value Date    EGFR 19 01/12/2025    EGFR 26 01/11/2025    EGFR 30 01/10/2025    CREATININE 2.17 (H) 01/12/2025    CREATININE 1.69 (H) 01/11/2025    CREATININE 1.48 (H) 01/10/2025     Pt now with NOLBERTO on CKD  Avoid nephrotoxic medications  Diuretics on hold at this time  Avoid hypotension  Will monitor her renal indices and I&Os closely

## 2025-01-13 NOTE — ASSESSMENT & PLAN NOTE
Etiology secondary bacterial infection vs Aspiration  Ceftriaxone 2 grams IV Q24 hr D #5  MRSA Nares pending  Elevated PCT on 1/11/2025 1/11/2025 Sputum cx pending  Mucomyst initiated 1/12

## 2025-01-13 NOTE — ASSESSMENT & PLAN NOTE
-TTE 1/9/2025 showed EF 72%, grade 2 DD, severe LAE, mild WILBERT, mild AI, estimated PA pressure 46 mmHg, small pericardial effusion, moderate left pleural effusion  Neurohormonal Blockade:  --Beta Blocker: Toprol 25 mg QD  --ARNi / ACEi / ARB: None  --Aldosterone Antagonist: None  --SGLT2 Inhibitor: None  --Home Diuretic: None  --Inpatient Diuretic: None    - Currently holding diuretics, recommend resuming when feasible

## 2025-01-13 NOTE — PROGRESS NOTES
Pastoral Care Progress Note           responded to [facility] request to support family as patient nears death.  Encouraged life review and suggested family continue to share memories of how she touched their lives. Provided a blessing and prayer. Patient's daughter is especially emotional due to her father's poor health and her brother's being out of the country.  Provided some guidance related to saying good-byes and dealing with grief.   will remain available briefly since death may a imminent.        01/13/25 1700   Clinical Encounter Type   Visited With Patient and family together   Crisis Visit Patient actively dying   Referral From Nurse   Church Encounters   Church Needs Prayer   Patient Spiritual Encounters   Spiritual Encounter Notes CR  called to support family as patient has been transitioned to comfort care.  lingering for continued support for awhile.

## 2025-01-13 NOTE — ASSESSMENT & PLAN NOTE
Blood pressure remains stable  On metoprolol succinate 25 mg daily for SVT  Hold all other antihypertensives

## 2025-01-13 NOTE — RESPIRATORY THERAPY NOTE
01/12/25 1925   Respiratory Assessment   Assessment Type During-treatment   General Appearance Sedated   Respiratory Pattern Assisted   Chest Assessment Chest expansion symmetrical   Vent Information   Vent ID 1   Vent type Rodriguez C3   Rodriguez Vent Mode (S)CMV   $ Pulse Oximetry Spot Check Charge Completed   SpO2 97 %   (S)CMV Settings   Resp Rate (BPM) 16 BPM   VT (mL) 350 mL   FIO2 (%) 50 %   PEEP (cmH2O) 6 cmH2O   I:E Ratio 1:2.8   Insp Time (%) 1 %   Flow Trigger (LPM) 2   Humidification Heater   Heater Temperature (Set) 98.6 °F (37 °C)   Pause Time (%) 0 %   (S)CMV Actuals   Resp Rate (BPM) 23 BPM   VT (mL) 334   MV 7.4   MAP (cmH2O) 9.9 cmH2O   Peak Pressure (cmH2O) 34 cmH2O   I:E Ratio (Obs) 1:1.7   Heater Temperature (Obs) 98.6 °F (37 °C)   Static Compliance (mL/cmH20) 41.6 mL/cmH2O   (S)CMV Alarms   High Peak Pressure (cmH2O) 50   Low Pressure (cmH2O) 5 cm H2O   High Resp Rate (BPM) 50 BPM   Low Resp Rate (BPM) 5 BPM   High MV (L/min) 20 L/min   Low MV (L/min) 3 L/min   High VT (mL) 1000 mL   Low VT (mL) 250 mL   Maintenance   Alarm (pink) cable attached No   Resuscitation bag with peep valve at bedside Yes   Water bag changed No   Circuit changed No   Daily Screen   Patient safety screen outcome: Failed   Not Ready for Weaning due to: Underline problem not resolved   IHI Ventilator Associated Pneumonia Bundle   Daily Awakening Trials Performed Not applicable (Comment)   Daily Assessment of Readiness to Extubate Not applicable (Comment)   Head of Bed Elevated HOB 30   ETT  Cuffed 7 mm   Placement Date/Time: 01/11/25 (c) 6362   Type: Cuffed  Tube Size: 7 mm  Insertion attempts: 2  Placement Verification: End tidal CO2  Secured at (cm): 22   Secured at (cm) 22   Measured from Lips   Secured Location Left   Repositioned Left to Right   Secured by Commercial tube lizama   Site Condition Dry   Cuff Pressure (color) Green   HI-LO Suction  Intermittent suction   HI-LO Secretions Scant   HI-LO Intervention  Left voicemail I'm not sure what the below message is asking. Just need to clarify what his question is.    Patent

## 2025-01-13 NOTE — PHYSICAL THERAPY NOTE
PHYSICAL THERAPY NOTE          Patient Name: Nilsa Morales  Today's Date: 1/13/2025 01/13/25 0802   PT Last Visit   PT Visit Date 01/13/25   Note Type   Note type Cancelled Session   Cancel Reasons Intubated/sedated   Additional Comments Pt remains intubated. Will defer PT eval at this time. Will continue to follow as medically appropriate.     Real Blake

## 2025-01-13 NOTE — CASE MANAGEMENT
Case Management Discharge Planning Note    Patient name Nilsa Morales  Location ICU 06/ICU 06 MRN [id number]  : 2/3/1934 Date 2025       Current Admission Date: 2025  Current Admission Diagnosis:Acute respiratory failure with hypoxia (HCC)   Patient Active Problem List    Diagnosis Date Noted Date Diagnosed    Influenza A 2025     Pneumonia 2025     Acute respiratory failure with hypoxia (HCC) 01/10/2025     Acute on chronic diastolic (congestive) heart failure (HCC) 2025     History of paroxysmal atrial tachycardia 2025     Stage 3 chronic kidney disease, unspecified whether stage 3a or 3b CKD (HCC) 2025     History of CVA in adulthood 10/23/2024     Lumbar radiculopathy      Anemia 2022     Pericardial effusion 2020     History of stroke, 2020     Idiopathic thrombocythemia (HCC) 2020     Disability of walking 2017     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     Lumbar stenosis with neurogenic claudication 05/15/2017     Vitamin B 12 deficiency 2016     Elevated fasting glucose 2016     Essential hypertension 2016     Osteoporosis 2011       LOS (days): 4  Geometric Mean LOS (GMLOS) (days): 4  Days to GMLOS:-0.3     OBJECTIVE:  Risk of Unplanned Readmission Score: 22.41         Current admission status: Inpatient   Preferred Pharmacy:   [id number] - [address] [address]  Phone: [phone] Fax: [phone]    Primary Care Provider: Danii Aceves DO    Primary Insurance: Lalina REP  Secondary Insurance:     DISCHARGE DETAILS:          Comments - Freedom of Choice: Comfort care  CM contacted family/caregiver?: Yes (family is at the bedside)  Were Treatment Team discharge recommendations reviewed with patient/caregiver?:  (by the ICU providers)           Contacts  Patient Contacts: Yasmine Morales, robyn  Relationship to Patient:: Family  Contact Method: In Person  Reason/Outcome: Discharge Planning, Continuity of Care (Comfort care discussion with the family - provided by the ICU providers)         Treatment Team Recommendation: Other (Comfort care)  Discharge Destination Plan::  (comfort care)        Family notified:: The patient was placed on comfort care after the ICU providers had the GOC conversation with the family. CM provided the bereavement support resources to the providers for the patient. CM department will continue to follow the patient for assistance where needed. CM returned the  [employer] phone call. A voicemail message left for Sharla Perdomo the representative. Voicemail entails the reason for the call and care manager's contact information for callback.

## 2025-01-13 NOTE — PLAN OF CARE
Problem: Potential for Falls  Goal: Patient will remain free of falls  Description: INTERVENTIONS:  - Educate patient/family on patient safety including physical limitations  - Instruct patient to call for assistance with activity   - Consult OT/PT to assist with strengthening/mobility   - Keep Call bell within reach  - Keep bed low and locked with side rails adjusted as appropriate  - Keep care items and personal belongings within reach  - Initiate and maintain comfort rounds  - Make Fall Risk Sign visible to staff  - Offer Toileting every 2 Hours, in advance of need  - Initiate/Maintain bed alarm  - Obtain necessary fall risk management equipment  - Apply yellow socks and bracelet for high fall risk patients  - Consider moving patient to room near nurses station  Outcome: Progressing     Problem: Prexisting or High Potential for Compromised Skin Integrity  Goal: Skin integrity is maintained or improved  Description: INTERVENTIONS:  - Identify patients at risk for skin breakdown  - Assess and monitor skin integrity  - Assess and monitor nutrition and hydration status  - Monitor labs   - Assess for incontinence   - Turn and reposition patient  - Assist with mobility/ambulation  - Relieve pressure over bony prominences  - Avoid friction and shearing  - Provide appropriate hygiene as needed including keeping skin clean and dry  - Evaluate need for skin moisturizer/barrier cream  - Collaborate with interdisciplinary team   - Patient/family teaching  - Consider wound care consult   Outcome: Progressing     Problem: SAFETY,RESTRAINT: NV/NON-SELF DESTRUCTIVE BEHAVIOR  Goal: Remains free of harm/injury (restraint for non violent/non self-detsructive behavior)  Description: INTERVENTIONS:  - Instruct patient/family regarding restraint use   - Assess and monitor physiologic and psychological status   - Provide interventions and comfort measures to meet assessed patient needs   - Identify and implement measures to help  patient regain control  - Assess readiness for release of restraint   Outcome: Progressing  Goal: Returns to optimal restraint-free functioning  Description: INTERVENTIONS:  - Assess the patient's behavior and symptoms that indicate continued need for restraint  - Identify and implement measures to help patient regain control  - Assess readiness for release of restraint   Outcome: Progressing

## 2025-01-14 LAB
ALBUMIN UR ELPH-MCNC: 79.4 %
ALPHA1 GLOB MFR UR ELPH: 3.2 %
ALPHA2 GLOB MFR UR ELPH: 6.8 %
B-GLOBULIN MFR UR ELPH: 1.1 %
BACTERIA SPT RESP CULT: NO GROWTH
GAMMA GLOB MFR UR ELPH: 9.5 %
GRAM STN SPEC: NORMAL
GRAM STN SPEC: NORMAL
INTERPRETATION UR IFE-IMP: NORMAL
KAPPA LC UR-MCNC: 21.48 MG/L (ref 1.17–86.46)
KAPPA LC/LAMBDA UR: 4.55 {RATIO} (ref 1.83–14.26)
LAMBDA LC UR-MCNC: 4.72 MG/L (ref 0.27–15.21)
PROT PATTERN UR ELPH-IMP: NORMAL
PROT UR-MCNC: 66.8 MG/DL

## 2025-01-14 PROCEDURE — 84166 PROTEIN E-PHORESIS/URINE/CSF: CPT | Performed by: PATHOLOGY

## 2025-01-14 NOTE — DEATH NOTE
INPATIENT DEATH NOTE  Nilsa Morales 89 y.o. female MRN: [id number]  Unit/Bed#: ICU 06 Encounter: [id number]    Date, Time and Cause of Death    Date of Death: 25  Time of Death:  7:02 PM  Preliminary Cause of Death: Acute hypoxic respiratory failure (HCC)  Entered by: Sri Sierra PA-C[BL1.1]       Attribution       BL1.1 Sri Sierra PA-C 25 19:06             Patient's Information  Pronounced by: Sri Sierra PA-C  Did the patient's death occur in the ED?: No  Did the patient's death occur in the OR?: No  Did the patient's death occur less than 10 days post-op?: No  Did the patient's death occur within 24 hours of admission?: No  Was code status DNR at the time of death?: Yes    PHYSICAL EXAM:  Unresponsive to noxious stimuli, Spontaneous respirations absent, Breath sounds absent, Carotid pulse absent, Heart sounds absent, Pupillary light reflex absent, and Corneal blink reflex absent    Medical Examiner notification criteria:  NONE APPLICABLE   Medical Examiner's office notified?:  No, does not meet ME notification criteria     Family Notification  Was the family notified?: Yes  Date Notified: 25  Time Notified:   Notified by: Sri Sierra PA-C  Name of Family Notified of Death: Christiano Morales   Relationship to Patient: Son  Family Notification Route: Telephone  Was the family told to contact a  home?: Yes    Autopsy Options:  Post-mortem examination declined by next of kin    Primary Service Attending Physician notified?:  yes - Attending:  Lisa Gooden MD    Physician/Resident responsible for completing Discharge Summary:  Sri Sierra PA-C

## 2025-01-14 NOTE — DISCHARGE SUMMARY
"Admission Date: 1/9/2025   Admitting Diagnosis: Pulmonary edema [J81.1]  Dyspnea [R06.00]  SOB (shortness of breath) [R06.02]  Bilateral leg edema [R60.0]  Elevated blood pressure reading with diagnosis of hypertension [I10]  Discharge Date: 01/13/25    HPI: \"Nilsa Morales is a 89 y.o. female with a PMH of PAT, CKD, stroke, pericardial effusion, and hypertension who presents with shortness of breath.     Patient reports that she started developing lower extremity edema over the last week. No other associated symptoms were noted besides dyspnea on exertion. Earlier today, she woke up with shortness of breath. Her family member called 911 and had her taken to the hospital for further evaluation. She appeared volume overloaded given her edema, elevated blood pressure, and imaging findings. She had clinical improvement with the lasix so far. She will be admitted for suspected CHF.\" Per Dr. Forte 1/9/24    Procedures Performed:   Orders Placed This Encounter   Procedures    Intubation       Summary of Hospital Course: Patient was admitted to the floor, treated for CHF exacerbation and Influenza A/Aspiration PNA. On  January 10, she had increased WOB and was obtunded. Plan was to intubate on arrival to ICU, however her cognition and respiratory status greatly improved. On 1/11 early morning she continued on continuous Bipap and Tamiflu. She did not improve on continuous Bipap from a neuro or respiratory standpoint and was intubated on 1/11 for hypoxic and hypercarbic respiratory failure. She was given nebulizers, including Mucomyst for thick secretions, and underwent pulmonary toileting on 1/12. She passed an SBT on 1/12, but was kept intubated due to copious secretions. 1/13- Passed SBT and was extubated. Shortly after being extubated, she had increased WOB and hypoxia. She was started on HFNC, had aggressive suctioning for thick secretions, and was again started on Precedex. She continued to deteriorate and GOC " discussion with patient, her daughter bedside, and son (POA) were had and patient transitioned to Comfort Care. She passed on  at 7:02pm. Our deepest condolences are with the family during this difficult time.      Significant Findings, Care, Treatment and Services Provided:     XR chest portable  Result Date: 2025  Impression: Patchy bilateral airspace opacities with pleural effusions, increased from the prior study and suggestive of congestive heart failure. Workstation performed: [id number]     XR chest portable ICU  Result Date: 2025  Impression: Persistent extensive bilateral consolidation which could be due to edema and/or pneumonia in the appropriate clinical setting with small pleural effusions. Workstation performed: [id number]     XR chest portable  Result Date: 2025  Impression: Moderate pulmonary edema with moderate pleural effusions and bibasilar atelectasis. Pneumonia not excluded in the appropriate clinical setting. Workstation performed: [id number]     XR chest portable ICU  Result Date: 2025  Impression: ET tube 6 cm above the makenzie and NG tube below the diaphragm. Persistent pulmonary edema with small pleural effusions and bibasilar atelectasis. Focal consolidation in the medial right midlung could represent pneumonia. Workstation performed: [id number]     XR chest portable ICU  Result Date: 2025  Impression: Persistent moderate pulmonary edema with moderate effusions and bibasilar atelectasis. Workstation performed: [id number]     XR chest portable  Result Date: 1/10/2025  Impression: CHF with bibasilar pleural effusions noted and probable compressive atelectasis. Workstation performed: [id number]     XR chest 2 views  Result Date: 2025  Impression: Interstitial edema and small effusions. Workstation performed: [id number]        Complications: None    Disposition:      Final Diagnosis: Acute hypoxic respiratory failure    Medical Problems       Resolved  Problems  Date Reviewed: 2025   None         Condition at Time of Death: Poor  Date, Time and Cause of Death    Date of Death: 25  Time of Death:  7:02 PM  Preliminary Cause of Death: Acute hypoxic respiratory failure (HCC)  Entered by: Sri Sierra PA-C[BL1.1]       Attribution       BL1.1 Sri Sierra PA-C 25 19:06            Death Note:  INPATIENT DEATH NOTE  Nilsa Morales 89 y.o. female MRN: [id number]  Unit/Bed#: ICU 06 Encounter: [id number]    Date, Time and Cause of Death    Date of Death: 25  Time of Death:  7:02 PM  Preliminary Cause of Death: Acute hypoxic respiratory failure (HCC)  Entered by: Sri Sierra PA-C[BL1.1]       Attribution       BL1.1 Sri Sierra PA-C 25 19:06             Patient's Information  Pronounced by: Sri Sierra PA-C  Did the patient's death occur in the ED?: No  Did the patient's death occur in the OR?: No  Did the patient's death occur less than 10 days post-op?: No  Did the patient's death occur within 24 hours of admission?: No  Was code status DNR at the time of death?: Yes    PHYSICAL EXAM:  Unresponsive to noxious stimuli, Spontaneous respirations absent, Breath sounds absent, Carotid pulse absent, Heart sounds absent, Pupillary light reflex absent, and Corneal blink reflex absent    Medical Examiner notification criteria:  NONE APPLICABLE   Medical Examiner's office notified?:  No, does not meet ME notification criteria     Family Notification  Was the family notified?: Yes  Date Notified: 25  Time Notified:   Notified by: Sri Sierra PA-C  Name of Family Notified of Death: Christiano Morales   Relationship to Patient: Son  Family Notification Route: Telephone  Was the family told to contact a  home?: Yes    Autopsy Options:  Post-mortem examination declined by next of kin    Primary Service Attending Physician notified?:  yes - Attending:  Lisa Gooden MD    Physician/Resident  responsible for completing Discharge Summary:  Sri Sierra PA-C

## 2025-01-14 NOTE — UTILIZATION REVIEW
NOTIFICATION OF ADMISSION DISCHARGE   This is a Notification of Discharge from [facility]. Please be advised that this patient has been discharge from our facility. Below you will find the admission and discharge date and time including the patient’s disposition.   UTILIZATION REVIEW CONTACT:  Amy Fortune  Utilization   Network Utilization Review Department  Phone: [phone] x carefully listen to the prompts. All voicemails are confidential.  Email: [email or web address]     ADMISSION INFORMATION  PRESENTATION DATE: 2025  7:26 AM  OBERVATION ADMISSION DATE: N/A  INPATIENT ADMISSION DATE: 25  9:17 AM   DISCHARGE DATE: 2025  8:36 PM   DISPOSITION:    Network Utilization Review Department  ATTENTION: Please call with any questions or concerns to [phone] and carefully listen to the prompts so that you are directed to the right person. All voicemails are confidential.   For Discharge needs, contact Care Management DC Support Team at [phone] opt. 2  Send all requests for admission clinical reviews, approved or denied determinations and any other requests to dedicated fax number below belonging to the campus where the patient is receiving treatment. List of dedicated fax numbers for the Facilities:  FACILITY NAME UR FAX NUMBER   ADMISSION DENIALS (Administrative/Medical Necessity) [phone]   DISCHARGE SUPPORT TEAM ([facility]) [phone]   PARENT CHILD HEALTH (Maternity/NICU/Pediatrics) [phone]   [facility] [phone]   [facility] [phone]   [facility] [phone]   General acute hospital [phone]   [facility] [phone]   [facility] [phone]   [facility] [phone]   [facility] [phone]   [facility]  [facility] [phone]   [facility] [phone]   [facility] [phone]   [facility] [phone]

## 2025-01-15 ENCOUNTER — APPOINTMENT (OUTPATIENT)
Dept: PHYSICAL THERAPY | Facility: MEDICAL CENTER | Age: OVER 89
End: 2025-01-15
Payer: COMMERCIAL

## 2025-01-15 LAB
BACTERIA BLD CULT: NORMAL
BACTERIA BLD CULT: NORMAL
SCAN RESULT: NORMAL

## 2025-01-16 ENCOUNTER — TELEPHONE (OUTPATIENT)
Dept: FAMILY MEDICINE CLINIC | Facility: CLINIC | Age: OVER 89
End: 2025-01-16

## 2025-01-18 LAB
BACTERIA BLD CULT: NORMAL
BACTERIA BLD CULT: NORMAL

## 2025-01-22 ENCOUNTER — APPOINTMENT (OUTPATIENT)
Dept: PHYSICAL THERAPY | Facility: MEDICAL CENTER | Age: OVER 89
End: 2025-01-22
Payer: COMMERCIAL